# Patient Record
Sex: FEMALE | Race: WHITE | Employment: UNEMPLOYED | URBAN - METROPOLITAN AREA
[De-identification: names, ages, dates, MRNs, and addresses within clinical notes are randomized per-mention and may not be internally consistent; named-entity substitution may affect disease eponyms.]

---

## 2021-09-11 ENCOUNTER — HOSPITAL ENCOUNTER (EMERGENCY)
Age: 86
Discharge: HOME | End: 2021-09-11
Attending: EMERGENCY MEDICINE
Payer: MEDICARE

## 2021-09-11 VITALS
SYSTOLIC BLOOD PRESSURE: 160 MMHG | WEIGHT: 97 LBS | TEMPERATURE: 97.8 F | RESPIRATION RATE: 16 BRPM | DIASTOLIC BLOOD PRESSURE: 78 MMHG | OXYGEN SATURATION: 98 % | BODY MASS INDEX: 18.94 KG/M2 | HEART RATE: 92 BPM

## 2021-09-11 DIAGNOSIS — R30.0 DYSURIA: Primary | ICD-10-CM

## 2021-09-11 DIAGNOSIS — N30.00 ACUTE CYSTITIS WITHOUT HEMATURIA: ICD-10-CM

## 2021-09-11 LAB
BACTERIA: ABNORMAL /HPF
BILIRUBIN URINE: ABNORMAL MG/DL
BLOOD, URINE: ABNORMAL
CAST TYPE: ABNORMAL /HPF
CLARITY: ABNORMAL
COLOR: ABNORMAL
CRYSTAL TYPE: NEGATIVE /HPF
EPITHELIAL CELLS, UA: >10 /HPF
GLUCOSE, URINE: ABNORMAL MG/DL
ICTOTEST: NEGATIVE
KETONES, URINE: ABNORMAL MG/DL
LEUKOCYTE ESTERASE, URINE: ABNORMAL
NITRITE URINE, QUANTITATIVE: ABNORMAL
PH, URINE: ABNORMAL (ref 5–8)
PROTEIN UA: ABNORMAL MG/DL
RBC URINE: 25 /HPF (ref 0–6)
SPECIFIC GRAVITY UA: 1.02 (ref 1–1.03)
UROBILINOGEN, URINE: ABNORMAL MG/DL (ref 0.2–1)
WBC UA: >100 /HPF (ref 0–5)

## 2021-09-11 PROCEDURE — 87086 URINE CULTURE/COLONY COUNT: CPT

## 2021-09-11 PROCEDURE — 87186 SC STD MICRODIL/AGAR DIL: CPT

## 2021-09-11 PROCEDURE — 99282 EMERGENCY DEPT VISIT SF MDM: CPT

## 2021-09-11 PROCEDURE — 81001 URINALYSIS AUTO W/SCOPE: CPT

## 2021-09-11 PROCEDURE — 87077 CULTURE AEROBIC IDENTIFY: CPT

## 2021-09-11 RX ORDER — NITROFURANTOIN 25; 75 MG/1; MG/1
100 CAPSULE ORAL ONCE
Status: DISCONTINUED | OUTPATIENT
Start: 2021-09-11 | End: 2021-09-11 | Stop reason: HOSPADM

## 2021-09-11 RX ORDER — NITROFURANTOIN 25; 75 MG/1; MG/1
100 CAPSULE ORAL 2 TIMES DAILY
Qty: 14 CAPSULE | Refills: 0 | Status: SHIPPED | OUTPATIENT
Start: 2021-09-11 | End: 2021-09-18

## 2021-09-11 RX ORDER — PHENAZOPYRIDINE HYDROCHLORIDE 100 MG/1
100 TABLET, FILM COATED ORAL 3 TIMES DAILY PRN
Qty: 10 TABLET | Refills: 0 | Status: SHIPPED | OUTPATIENT
Start: 2021-09-11 | End: 2021-09-14

## 2021-09-11 ASSESSMENT — PAIN SCALES - WONG BAKER: WONGBAKER_NUMERICALRESPONSE: 0

## 2021-09-11 ASSESSMENT — ENCOUNTER SYMPTOMS
BLOOD IN STOOL: 0
VOMITING: 0
SORE THROAT: 0
TROUBLE SWALLOWING: 0
DIARRHEA: 0
APNEA: 0
STRIDOR: 0
NAUSEA: 0
RECTAL PAIN: 0
CHEST TIGHTNESS: 0
RHINORRHEA: 0
COLOR CHANGE: 0
ABDOMINAL PAIN: 0
EYE PAIN: 0
ABDOMINAL DISTENTION: 0
EYE DISCHARGE: 0
VOICE CHANGE: 0
SHORTNESS OF BREATH: 0
SINUS PRESSURE: 0
CONSTIPATION: 0
BACK PAIN: 0
PHOTOPHOBIA: 0
COUGH: 0
EYE REDNESS: 0
WHEEZING: 0

## 2021-09-11 ASSESSMENT — PAIN SCALES - GENERAL: PAINLEVEL_OUTOF10: 10

## 2021-09-11 ASSESSMENT — PAIN DESCRIPTION - FREQUENCY: FREQUENCY: INTERMITTENT

## 2021-09-11 ASSESSMENT — PAIN DESCRIPTION - DESCRIPTORS: DESCRIPTORS: BURNING

## 2021-09-11 NOTE — ED TRIAGE NOTES
Pt reports burning with urination for a week  Pt seen at [facility] yesterday but didn't stay for results of urine test bc of the wait

## 2021-09-11 NOTE — ED PROVIDER NOTES
Josephine Gracia is a 80year old female with a history of frequent UTIs who presents to the ED with dysuria x1 week. She went to [facility] yesterday and had a urinalysis performed, but she left with out the results due to extended waiting time. BP (!) 160/78   Pulse 92   Temp 97.8 °F (36.6 °C)   Resp 16   Wt 97 lb (44 kg)   SpO2 98%   BMI 18.94 kg/m²     I have reviewed the following from the nursing documentation:      Prior to Admission medications    Medication Sig Start Date End Date Taking? Authorizing Provider   cyanocobalamin 1000 MCG tablet Take 1,000 mcg by mouth daily    Historical Provider, MD   ferrous sulfate 325 (65 FE) MG tablet Take 325 mg by mouth daily (with breakfast)    Historical Provider, MD   furosemide (LASIX) 40 MG tablet Take 40 mg by mouth daily    Historical Provider, MD   gentamicin (GARAMYCIN) 0.1 % ointment Apply topically 3 times daily Apply topically 3 times daily.  To skin tears PRN    Historical Provider, MD   HYDROcodone-acetaminophen (NORCO) 5-325 MG per tablet Take 1-2 tablets by mouth every 6 hours as needed for Pain (1 qam, 1 qnoon, 2 qhs prn)    Historical Provider, MD   LORazepam (ATIVAN) 1 MG tablet Take 1 mg by mouth every 12 hours as needed for Anxiety    Historical Provider, MD   metoprolol tartrate (LOPRESSOR) 25 MG tablet Take 12.5 mg by mouth 2 times daily    Historical Provider, MD   nitrofurantoin (MACRODANTIN) 100 MG capsule Take 100 mg by mouth    Historical Provider, MD   omeprazole (PRILOSEC) 40 MG delayed release capsule Take 40 mg by mouth daily    Historical Provider, MD   potassium chloride (KLOR-CON M) 20 MEQ extended release tablet Take 20 mEq by mouth 2 times daily    Historical Provider, MD   promethazine (PHENERGAN) 25 MG tablet Take 25 mg by mouth every 6 hours as needed for Nausea    Historical Provider, MD   simvastatin (ZOCOR) 40 MG tablet Take 40 mg by mouth nightly    Historical Provider, MD   warfarin (COUMADIN) 2 MG tablet Take 2 mg by mouth 3 mg 3times a week    Historical Provider, MD       Allergies as of 09/11/2021 - Fully Reviewed 09/11/2021   Allergen Reaction Noted    Ciprocinonide [fluocinolone]  10/21/2016    Sulfa antibiotics Hives 10/21/2016    Tylenol [acetaminophen]  10/21/2016    Keflex [cephalexin] Nausea And Vomiting 09/12/2017    Nitrofuran derivatives Nausea And Vomiting 09/13/2017       Past Medical History:   Diagnosis Date    Acid reflux     Arthritis     CAD (coronary artery disease)     does not follow with cardiologist at this time    Cancer [facility])     skin cancer on her arm    [facility]est[facility] [address] [address]     DDD (degenerative disc disease), cervical     \"uses walker\"    Dementia ([facility] [address]     Full dentures     Gallstone     \"they will not due surgery on this\"   Shey [place] Stebbins (hard of hearing)     \"right ear- no hearing aide\"    Hx of blood clots     \"had blood clot both legs few yrs ago\"\"use to be on Coumadin for this- off this now    Hyperlipidemia     Hypertension     PONV (postoperative nausea and vomiting)     UTI (urinary tract infection)     following with dr Ric Sanchez UTI- hospitalized for this twice- getting picc line to get antibiotics\" per son 9/12/2017    Wears glasses         Surgical History:   Past Surgical History:   Procedure Laterality Date    BLADDER SUSPENSION  ? when   [address]      \"last one done approx 5 yrs ago in [place]\" per son 9/12/2017    CORONARY ARTERY BYPASS GRAFT  10+ yrs ago    [facility] son unsure # vessels    KIDNEY SURGERY      OTHER SURGICAL HISTORY      \"got hematomar right leg- removed June 2017\"    OTHER SURGICAL HISTORY Right 09/19/2017    removal tunnel catheter        Family History:    Family History   Problem Relation Age of Onset    Breast Cancer Mother     Cancer Father         prostate       Social History     Socioeconomic History    Marital status:       Spouse name: Not on file    Number of children: Not on file    Years of education: Not on file    Highest education level: Not on file   Occupational History    Not on file   Tobacco Use    Smoking status: Never Smoker    Smokeless tobacco: Never Used   Substance and Sexual Activity    Alcohol use: No    Drug use: No    Sexual activity: Not on file   Other Topics Concern    Not on file   Social History Narrative    Not on file     Social Determinants of Health     Financial Resource Strain:     Difficulty of Paying Living Expenses:    Food Insecurity:     Worried About Running Out of Food in the Last Year:     [address] in the Last Year:    Transportation Needs:     Lack of Transportation (Medical):  Lack of Transportation (Non-Medical):    Physical Activity:     Days of Exercise per Week:     Minutes of Exercise per Session:    Stress:     Feeling of Stress :    Social Connections:     Frequency of Communication with Friends and Family:     Frequency of Social Gatherings with Friends and Family:     Attends [employer] Services:     Active Member of Clubs or Organizations:     Attends Club or Organization Meetings:     Marital Status:    Intimate Partner Violence:     Fear of Current or Ex-Partner:     Emotionally Abused:     Physically Abused:     Sexually Abused:          Review of Systems   Constitutional: Negative for activity change, appetite change, chills, diaphoresis, fatigue, fever and unexpected weight change. HENT: Negative for congestion, ear pain, mouth sores, rhinorrhea, sinus pressure, sore throat, tinnitus, trouble swallowing and voice change. Eyes: Negative for photophobia, pain, discharge, redness and visual disturbance. Respiratory: Negative for apnea, cough, chest tightness, shortness of breath, wheezing and stridor. Cardiovascular: Negative for chest pain, palpitations and leg swelling.    Gastrointestinal: Negative for abdominal distention, abdominal pain, blood in stool, constipation, diarrhea, nausea, rectal pain and vomiting. Genitourinary: Positive for dysuria. Negative for difficulty urinating, dyspareunia, flank pain, frequency, genital sores, menstrual problem, pelvic pain, urgency, vaginal bleeding, vaginal discharge and vaginal pain. Musculoskeletal: Negative for arthralgias, back pain, joint swelling, neck pain and neck stiffness. Skin: Negative for color change and rash. Neurological: Negative for dizziness, tremors, seizures, syncope, facial asymmetry, speech difficulty, weakness, light-headedness, numbness and headaches. Hematological: Negative for adenopathy. Does not bruise/bleed easily. Psychiatric/Behavioral: Negative for agitation, confusion, dysphoric mood, hallucinations, self-injury, sleep disturbance and suicidal ideas. All other systems reviewed and are negative. Physical Exam  Constitutional:       General: She is not in acute distress. Appearance: Normal appearance. She is not ill-appearing. HENT:      Head: Normocephalic and atraumatic. Nose: Nose normal.   Eyes:      Extraocular Movements: Extraocular movements intact. Conjunctiva/sclera: Conjunctivae normal.      Pupils: Pupils are equal, round, and reactive to light. Cardiovascular:      Rate and Rhythm: Normal rate and regular rhythm. Pulses: Normal pulses. Heart sounds: No murmur heard. Pulmonary:      Effort: Pulmonary effort is normal.      Breath sounds: Normal breath sounds. Abdominal:      General: Abdomen is flat. Bowel sounds are normal.      Palpations: Abdomen is soft. Tenderness: There is no abdominal tenderness. There is no right CVA tenderness or left CVA tenderness. Musculoskeletal:         General: Normal range of motion. Cervical back: Normal range of motion. Skin:     General: Skin is warm and dry. Capillary Refill: Capillary refill takes less than 2 seconds. Neurological:      General: No focal deficit present. Mental Status: She is alert and oriented to person, place, and time. Psychiatric:         Mood and Affect: Mood normal.         Behavior: Behavior normal.          Procedures     MDM   Results for orders placed or performed during the hospital encounter of 09/11/21   Urinalysis with microscopic   Result Value Ref Range    Color, UA ORNAGE YELLOW    Clarity, UA CLOUDY (A) CLEAR    Glucose, Urine COLOR INTERFERENCE NEGATIVE MG/DL    Bilirubin Urine COLOR INTERFERENCE NEGATIVE MG/DL    Ketones, Urine COLOR INTERFERENCE NEGATIVE MG/DL    Specific Gravity, UA 1.016 1.001 - 1.035    Blood, Urine COLOR INTERFERENCE NEGATIVE    pH, Urine COLOR INTERFERENCE 5.0 - 8.0    Protein, UA COLOR INTERFERENCE NEGATIVE MG/DL    Urobilinogen, Urine COLOR INTERFERENCE 0.2 - 1.0 MG/DL    Nitrite Urine, Quantitative COLOR INTERFERENCE NEGATIVE    Leukocyte Esterase, Urine COLOR INTERFERENCE NEGATIVE    RBC, UA 25 (H) 0 - 6 /HPF    WBC, UA >100 (H) 0 - 5 /HPF    Epithelial Cells, UA >10 /HPF    Cast Type NO CAST FORMS SEEN NO CAST FORMS SEEN /HPF    Bacteria, UA OCCASIONAL (A) NEGATIVE /HPF    Crystal Type NEGATIVE NEGATIVE /HPF   ICTOTEST, URINE   Result Value Ref Range    Ictotest NEGATIVE          I estimate there is LOW risk for EPIGLOTTITIS, PNEUMONIA, MENINGITIS, OR SEPSIS, thus I consider the discharge disposition reasonable. Also, there is no evidence or peritonitis, sepsis, or toxicity. Ruchi Hoover and I have discussed the diagnosis and risks, and we agree with discharging home to follow-up with their primary doctor. We also discussed returning to the Emergency Department immediately if new or worsening symptoms occur. We have discussed the symptoms which are most concerning (e.g., changing or worsening pain, trouble swallowing or breating, neck stiffness, fever) that necessitate immediate return. Final Impression    1. Dysuria    2.  Acute cystitis without hematuria        Discharge Vital Signs:  Blood pressure (!) 160/78, pulse 92, temperature 97.8 °F (36.6 °C), resp. rate 16, weight 97 lb (44 kg), SpO2 98 %, not currently breastfeeding.      Carly Tijerina MD  09/11/21 1013

## 2021-09-13 LAB
CULTURE: ABNORMAL
CULTURE: ABNORMAL
Lab: ABNORMAL
SPECIMEN: ABNORMAL

## 2021-11-16 ENCOUNTER — APPOINTMENT (OUTPATIENT)
Dept: CT IMAGING | Age: 86
End: 2021-11-16
Payer: MEDICARE

## 2021-11-16 ENCOUNTER — HOSPITAL ENCOUNTER (EMERGENCY)
Age: 86
Discharge: HOME | End: 2021-11-16
Attending: EMERGENCY MEDICINE
Payer: MEDICARE

## 2021-11-16 VITALS
SYSTOLIC BLOOD PRESSURE: 181 MMHG | HEART RATE: 66 BPM | OXYGEN SATURATION: 98 % | WEIGHT: 89 LBS | RESPIRATION RATE: 15 BRPM | BODY MASS INDEX: 17.47 KG/M2 | TEMPERATURE: 97.7 F | DIASTOLIC BLOOD PRESSURE: 89 MMHG | HEIGHT: 60 IN

## 2021-11-16 DIAGNOSIS — R19.7 DIARRHEA, UNSPECIFIED TYPE: Primary | ICD-10-CM

## 2021-11-16 DIAGNOSIS — N39.0 URINARY TRACT INFECTION WITHOUT HEMATURIA, SITE UNSPECIFIED: ICD-10-CM

## 2021-11-16 DIAGNOSIS — R11.0 NAUSEA: ICD-10-CM

## 2021-11-16 LAB
ALBUMIN SERPL-MCNC: 4.2 GM/DL (ref 3.4–5)
ALP BLD-CCNC: 85 IU/L (ref 40–129)
ALT SERPL-CCNC: 12 U/L (ref 10–40)
ANION GAP SERPL CALCULATED.3IONS-SCNC: 12 MMOL/L (ref 4–16)
AST SERPL-CCNC: 18 IU/L (ref 15–37)
BACTERIA: ABNORMAL /HPF
BASOPHILS ABSOLUTE: 0.1 K/CU MM
BASOPHILS RELATIVE PERCENT: 1.2 % (ref 0–1)
BILIRUB SERPL-MCNC: 0.4 MG/DL (ref 0–1)
BILIRUBIN DIRECT: 0.2 MG/DL (ref 0–0.3)
BILIRUBIN URINE: NEGATIVE MG/DL
BILIRUBIN, INDIRECT: 0.2 MG/DL (ref 0–0.7)
BLOOD, URINE: ABNORMAL
BUN BLDV-MCNC: 24 MG/DL (ref 6–23)
CALCIUM SERPL-MCNC: 9.8 MG/DL (ref 8.3–10.6)
CAST TYPE: ABNORMAL /HPF
CHLORIDE BLD-SCNC: 97 MMOL/L (ref 99–110)
CLARITY: ABNORMAL
CO2: 24 MMOL/L (ref 21–32)
COLOR: YELLOW
CREAT SERPL-MCNC: 1.4 MG/DL (ref 0.6–1.1)
CRYSTAL TYPE: NEGATIVE /HPF
DIFFERENTIAL TYPE: ABNORMAL
EOSINOPHILS ABSOLUTE: 0.1 K/CU MM
EOSINOPHILS RELATIVE PERCENT: 1.6 % (ref 0–3)
EPITHELIAL CELLS, UA: 10 /HPF
GFR AFRICAN AMERICAN: 42 ML/MIN/1.73M2
GFR NON-AFRICAN AMERICAN: 35 ML/MIN/1.73M2
GLUCOSE BLD-MCNC: 90 MG/DL (ref 70–99)
GLUCOSE, URINE: NEGATIVE MG/DL
HCT VFR BLD CALC: 46.4 % (ref 37–47)
HEMOGLOBIN: 14.9 GM/DL (ref 12.5–16)
IMMATURE NEUTROPHIL %: 0.1 % (ref 0–0.43)
KETONES, URINE: NEGATIVE MG/DL
LACTIC ACID, SEPSIS: 0.7 MMOL/L (ref 0.5–1.9)
LEUKOCYTE ESTERASE, URINE: ABNORMAL
LIPASE: 118 IU/L (ref 13–60)
LYMPHOCYTES ABSOLUTE: 2.4 K/CU MM
LYMPHOCYTES RELATIVE PERCENT: 32.4 % (ref 24–44)
MCH RBC QN AUTO: 29.7 PG (ref 27–31)
MCHC RBC AUTO-ENTMCNC: 32.1 % (ref 32–36)
MCV RBC AUTO: 92.6 FL (ref 78–100)
MONOCYTES ABSOLUTE: 0.5 K/CU MM
MONOCYTES RELATIVE PERCENT: 7.1 % (ref 0–4)
NITRITE URINE, QUANTITATIVE: NEGATIVE
PDW BLD-RTO: 13.1 % (ref 11.7–14.9)
PH, URINE: 6.5 (ref 5–8)
PLATELET # BLD: 244 K/CU MM (ref 140–440)
PMV BLD AUTO: 10 FL (ref 7.5–11.1)
POTASSIUM SERPL-SCNC: 4.9 MMOL/L (ref 3.5–5.1)
PROTEIN UA: NEGATIVE MG/DL
RBC # BLD: 5.01 M/CU MM (ref 4.2–5.4)
RBC URINE: 25 /HPF (ref 0–6)
SEGMENTED NEUTROPHILS ABSOLUTE COUNT: 4.3 K/CU MM
SEGMENTED NEUTROPHILS RELATIVE PERCENT: 57.6 % (ref 36–66)
SODIUM BLD-SCNC: 133 MMOL/L (ref 135–145)
SPECIFIC GRAVITY UA: 1 (ref 1–1.03)
TOTAL IMMATURE NEUTOROPHIL: 0.01 K/CU MM
TOTAL PROTEIN: 6.9 GM/DL (ref 6.4–8.2)
UROBILINOGEN, URINE: 0.2 MG/DL (ref 0.2–1)
WBC # BLD: 7.4 K/CU MM (ref 4–10.5)
WBC UA: 25 /HPF (ref 0–5)

## 2021-11-16 PROCEDURE — 83605 ASSAY OF LACTIC ACID: CPT

## 2021-11-16 PROCEDURE — 74176 CT ABD & PELVIS W/O CONTRAST: CPT

## 2021-11-16 PROCEDURE — 96375 TX/PRO/DX INJ NEW DRUG ADDON: CPT

## 2021-11-16 PROCEDURE — 80053 COMPREHEN METABOLIC PANEL: CPT

## 2021-11-16 PROCEDURE — 99284 EMERGENCY DEPT VISIT MOD MDM: CPT

## 2021-11-16 PROCEDURE — 82248 BILIRUBIN DIRECT: CPT

## 2021-11-16 PROCEDURE — 87086 URINE CULTURE/COLONY COUNT: CPT

## 2021-11-16 PROCEDURE — U0005 INFEC AGEN DETEC AMPLI PROBE: HCPCS

## 2021-11-16 PROCEDURE — 85025 COMPLETE CBC W/AUTO DIFF WBC: CPT

## 2021-11-16 PROCEDURE — 2500000003 HC RX 250 WO HCPCS: Performed by: EMERGENCY MEDICINE

## 2021-11-16 PROCEDURE — 6370000000 HC RX 637 (ALT 250 FOR IP): Performed by: EMERGENCY MEDICINE

## 2021-11-16 PROCEDURE — 81001 URINALYSIS AUTO W/SCOPE: CPT

## 2021-11-16 PROCEDURE — 83690 ASSAY OF LIPASE: CPT

## 2021-11-16 PROCEDURE — 87088 URINE BACTERIA CULTURE: CPT

## 2021-11-16 PROCEDURE — 96361 HYDRATE IV INFUSION ADD-ON: CPT

## 2021-11-16 PROCEDURE — 6360000002 HC RX W HCPCS: Performed by: EMERGENCY MEDICINE

## 2021-11-16 PROCEDURE — U0003 INFECTIOUS AGENT DETECTION BY NUCLEIC ACID (DNA OR RNA); SEVERE ACUTE RESPIRATORY SYNDROME CORONAVIRUS 2 (SARS-COV-2) (CORONAVIRUS DISEASE [COVID-19]), AMPLIFIED PROBE TECHNIQUE, MAKING USE OF HIGH THROUGHPUT TECHNOLOGIES AS DESCRIBED BY CMS-2020-01-R: HCPCS

## 2021-11-16 PROCEDURE — 96374 THER/PROPH/DIAG INJ IV PUSH: CPT

## 2021-11-16 PROCEDURE — 2580000003 HC RX 258: Performed by: EMERGENCY MEDICINE

## 2021-11-16 PROCEDURE — 87186 SC STD MICRODIL/AGAR DIL: CPT

## 2021-11-16 RX ORDER — NITROFURANTOIN 25; 75 MG/1; MG/1
100 CAPSULE ORAL ONCE
Status: COMPLETED | OUTPATIENT
Start: 2021-11-16 | End: 2021-11-16

## 2021-11-16 RX ORDER — ONDANSETRON 4 MG/1
4 TABLET, ORALLY DISINTEGRATING ORAL EVERY 8 HOURS PRN
Qty: 15 TABLET | Refills: 0 | Status: SHIPPED | OUTPATIENT
Start: 2021-11-16 | End: 2021-12-15 | Stop reason: SDUPTHER

## 2021-11-16 RX ORDER — NITROFURANTOIN 25; 75 MG/1; MG/1
100 CAPSULE ORAL 2 TIMES DAILY
Qty: 10 CAPSULE | Refills: 0 | Status: SHIPPED | OUTPATIENT
Start: 2021-11-16 | End: 2021-11-21

## 2021-11-16 RX ORDER — ONDANSETRON 2 MG/ML
4 INJECTION INTRAMUSCULAR; INTRAVENOUS ONCE
Status: COMPLETED | OUTPATIENT
Start: 2021-11-16 | End: 2021-11-16

## 2021-11-16 RX ORDER — 0.9 % SODIUM CHLORIDE 0.9 %
500 INTRAVENOUS SOLUTION INTRAVENOUS ONCE
Status: COMPLETED | OUTPATIENT
Start: 2021-11-16 | End: 2021-11-16

## 2021-11-16 RX ADMIN — FAMOTIDINE 20 MG: 10 INJECTION, SOLUTION INTRAVENOUS at 10:33

## 2021-11-16 RX ADMIN — NITROFURANTOIN (MONOHYDRATE/MACROCRYSTALS) 100 MG: 75; 25 CAPSULE ORAL at 12:08

## 2021-11-16 RX ADMIN — SODIUM CHLORIDE 500 ML: 9 INJECTION, SOLUTION INTRAVENOUS at 10:33

## 2021-11-16 RX ADMIN — ONDANSETRON 4 MG: 2 INJECTION INTRAMUSCULAR; INTRAVENOUS at 10:33

## 2021-11-16 ASSESSMENT — PAIN DESCRIPTION - FREQUENCY: FREQUENCY: CONTINUOUS

## 2021-11-16 ASSESSMENT — PAIN DESCRIPTION - PAIN TYPE: TYPE: ACUTE PAIN

## 2021-11-16 ASSESSMENT — PAIN SCALES - GENERAL: PAINLEVEL_OUTOF10: 8

## 2021-11-16 ASSESSMENT — PAIN DESCRIPTION - LOCATION: LOCATION: GENERALIZED

## 2021-11-16 NOTE — ED TRIAGE NOTES
Pt arrives with complaints of generalized weakness, \"sick to her stomach\" but hasn't vomited, she does have diarrhea, denies any fever or chills, she also is concerned about not sleeping for past week due to restlessness. She denies any shortness of breath, denies any chest pain, denies cough. She is vaccinated for COVID, she does report nasal congestion but states she is always congested.

## 2021-11-16 NOTE — ED NOTES
Son is Reagan Kimberly [phone] is in the parking lot     [facility]  11/16/21 3452

## 2021-11-16 NOTE — ED PROVIDER NOTES
CHIEF COMPLAINT  Chief Complaint   Patient presents with    Diarrhea       HPI  Glenis A Lars Felty is a 80 y.o. female with history of GERD, CAD, skin cancer, CHF, hypertension, UTI who presents with diarrhea and feeling \"sick to my stomach\" for the past 2 days. Diarrhea is been nonbloody, she denies any urinary changes, she denies any fevers or vomiting. Her discomfort is generalized, nonlocalized and in the abdomen. She denies any associated chest pain, cough. She has chronic nasal congestion which is unchanged. She denies any known ill contacts or fevers. She denies any trauma or falls. She denies any pain in her arms or legs. Her signs and symptoms are moderate and persistent, she has been drinking plenty of water and does not feel dehydrated but does have chronic dry mouth.       REVIEW OF SYSTEMS  Review of Systems   History obtained from chart review and the patient  General ROS: negative for - chills or fever  Ophthalmic ROS: negative for - decreased vision or double vision  ENT ROS: negative for - headaches  Hematological and Lymphatic ROS: negative for - bleeding problems  Endocrine ROS: negative for - unexpected weight changes  Respiratory ROS: no cough, shortness of breath, or wheezing  Cardiovascular ROS: no chest pain or dyspnea on exertion  Gastrointestinal ROS: positive for - diarrhea  Genito-Urinary ROS: no dysuria, trouble voiding, or hematuria  Musculoskeletal ROS: negative for - joint stiffness or joint swelling  Neurological ROS: no TIA or stroke symptoms      PAST MEDICAL HISTORY  Past Medical History:   Diagnosis Date    Acid reflux     Arthritis     CAD (coronary artery disease)     does not follow with cardiologist at this time    Cancer [facility])     skin cancer on her arm    CH[facility]congestive heart failure) [facility] [address]     DDD (degenerative disc disease), cervical     \"uses walker\"    Dementia ([address]     Full dentures     Gallstone     \"they will not due surgery on this\"    [place] (hard of hearing)     \"right ear- no hearing aide\"    Hx of blood clots     \"had blood clot both legs few yrs ago\"\"use to be on Coumadin for this- off this now    Hyperlipidemia     Hypertension     PONV (postoperative nausea and vomiting)     UTI (urinary tract infection)     following with dr Antonio Amaro UTI- hospitalized for this twice- getting picc line to get antibiotics\" per son 9/12/2017    Wears glasses        FAMILY HISTORY  Family History   Problem Relation Age of Onset    Breast Cancer Mother     Cancer Father         prostate       SOCIAL HISTORY  Social History     Socioeconomic History    Marital status:      Spouse name: None    Number of children: None    Years of education: None    Highest education level: None   Occupational History    None   Tobacco Use    Smoking status: Never Smoker    Smokeless tobacco: Never Used   Vaping Use    Vaping Use: Never used   Substance and Sexual Activity    Alcohol use: No    Drug use: No    Sexual activity: None   Other Topics Concern    None   Social History Narrative    None     Social Determinants of Health     Financial Resource Strain:     Difficulty of Paying Living Expenses: Not on file   Food Insecurity:     Worried About Running Out of Food in the Last Year: Not on file    [place] of Food in the Last Year: Not on file   Transportation Needs:     Lack of Transportation (Medical): Not on file    Lack of Transportation (Non-Medical):  Not on file   Physical Activity:     Days of Exercise per Week: Not on file    Minutes of Exercise per Session: Not on file   Stress:     Feeling of Stress : Not on file   Social Connections:     Frequency of Communication with Friends and Family: Not on file    Frequency of Social Gatherings with Friends and Family: Not on file    Attends Caoda[employer] Services: Not on file    Active Member of Clubs or Organizations: Not on file    Attends Club or Organization Meetings: Not on file    Marital Status: Not on file   Intimate Partner Violence:     Fear of Current or Ex-Partner: Not on file    Emotionally Abused: Not on file    Physically Abused: Not on file    Sexually Abused: Not on file   Housing Stability:     Unable to Pay for Housing in the Last Year: Not on file    Number of [place] in the Last Year: Not on file    Unstable Housing in the Last Year: Not on file       SURGICAL HISTORY  Past Surgical History:   Procedure Laterality Date    BLADDER SUSPENSION  ? when   [address]      \"[place] one done approx 5 yrs ago in [place]\" per son 9/12/2017    CORONARY ARTERY BYPASS GRAFT  10+ yrs ago    \"done at [facility]\" son unsure # vessels    KIDNEY SURGERY      OTHER SURGICAL HISTORY      \"got hematomar right leg- removed June 2017\"    OTHER SURGICAL HISTORY Right 09/19/2017    removal tunnel catheter       CURRENT MEDICATIONS  No current facility-administered medications on file prior to encounter. Current Outpatient Medications on File Prior to Encounter   Medication Sig Dispense Refill    cyanocobalamin 1000 MCG tablet Take 1,000 mcg by mouth daily      ferrous sulfate 325 (65 FE) MG tablet Take 325 mg by mouth daily (with breakfast)      furosemide (LASIX) 40 MG tablet Take 40 mg by mouth daily      gentamicin (GARAMYCIN) 0.1 % ointment Apply topically 3 times daily Apply topically 3 times daily.  To skin tears PRN      HYDROcodone-acetaminophen (NORCO) 5-325 MG per tablet Take 1-2 tablets by mouth every 6 hours as needed for Pain (1 qam, 1 qnoon, 2 qhs prn)      LORazepam (ATIVAN) 1 MG tablet Take 1 mg by mouth every 12 hours as needed for Anxiety      metoprolol tartrate (LOPRESSOR) 25 MG tablet Take 12.5 mg by mouth 2 times daily      omeprazole (PRILOSEC) 40 MG delayed release capsule Take 40 mg by mouth daily      potassium chloride (KLOR-CON M) 20 MEQ extended release tablet Take 20 mEq by mouth 2 times daily      promethazine (PHENERGAN) 25 MG tablet Take 25 mg by mouth every 6 hours as needed for Nausea      simvastatin (ZOCOR) 40 MG tablet Take 40 mg by mouth nightly      warfarin (COUMADIN) 2 MG tablet Take 2 mg by mouth 3 mg 3times a week           ALLERGIES  Allergies   Allergen Reactions    Ciprocinonide [Fluocinolone]     Sulfa Antibiotics Hives    Tylenol [Acetaminophen]      GI upset    Keflex [Cephalexin] Nausea And Vomiting    Nitrofuran Derivatives Nausea And Vomiting     Macrodantin       PHYSICAL EXAM  VITAL SIGNS: BP (!) 181/89   Pulse 66   Temp 97.7 °F (36.5 °C)   Resp 15   Ht 5' (1.524 m)   Wt 89 lb (40.4 kg)   SpO2 98%   BMI 17.38 kg/m²   Constitutional: Well developed, Well nourished, resting in bed, elderly, pleasant  HENT: Normocephalic, Atraumatic, Bilateral external ears normal, Oropharynx dry, No oral exudates, Nose normal.   Eyes: PERRL, EOMI, Conjunctiva normal, No discharge. Neck: Normal range of motion, Supple, No stridor. Cardiovascular: Normal heart rate, Normal rhythm, No murmurs, No rubs, No gallops. Thorax & Lungs: Normal breath sounds, No respiratory distress, No wheezing, No chest tenderness. Abdomen: Bowel sounds normal, Soft, minimal lower tenderness, no guarding, no rebound, No masses, No pulsatile masses. No CVA tenderness  Skin: Warm, Dry, No erythema, No rash. Extremities: Intact distal pulses, No edema, No tenderness, No cyanosis, No clubbing. Musculoskeletal: Good gross range of motion in all major joints. No major deformities noted. Neurologic: Alert & oriented to person and place, Normal gross motor function, Normal gross sensory function, No focal deficits noted. Psychiatric: Affect normal    RADIOLOGY/PROCEDURES/LABS  Last Imaging results   CT ABDOMEN PELVIS WO CONTRAST Additional Contrast? None   Final Result   1. Moderate to large hiatal hernia to the right of midline with some   atelectatic changes right lower lobe.    2. Extensive diverticulosis worse in the sigmoid but no acute diverticulitis. 3. Significant left renal atrophy and hypertrophy of the right kidney appear   chronic. No obstructive uropathy. 4. Moderate to severe scoliotic deformity which is partly degenerative in   nature. Small bowel ileus. 5. Right inguinal hernia which contains a small portion of bowel but no   obstruction or strangulation. Imaging reviewed by myself    Labs Reviewed   CBC WITH AUTO DIFFERENTIAL - Abnormal; Notable for the following components:       Result Value    Monocytes % 7.1 (*)     Basophils % 1.2 (*)     All other components within normal limits   BASIC METABOLIC PANEL W/ REFLEX TO MG FOR LOW K - Abnormal; Notable for the following components:    Sodium 133 (*)     Chloride 97 (*)     BUN 24 (*)     CREATININE 1.4 (*)     GFR Non- 35 (*)     GFR  42 (*)     All other components within normal limits   LIPASE - Abnormal; Notable for the following components:    Lipase 118 (*)     All other components within normal limits   URINALYSIS - Abnormal; Notable for the following components:    Clarity, UA SLIGHTLY CLOUDY (*)     Blood, Urine MODERATE (*)     Leukocyte Esterase, Urine MODERATE (*)     RBC, UA 25 (*)     WBC, UA 25 (*)     Bacteria, UA FEW (*)     All other components within normal limits   CULTURE, URINE   HEPATIC FUNCTION PANEL   LACTATE, SEPSIS   COVID-19         Medications   0.9 % sodium chloride bolus (0 mLs IntraVENous Stopped 11/16/21 1159)   famotidine (PEPCID) injection 20 mg (20 mg IntraVENous Given 11/16/21 1033)   ondansetron (ZOFRAN) injection 4 mg (4 mg IntraVENous Given 11/16/21 1033)   nitrofurantoin (macrocrystal-monohydrate) (MACROBID) capsule 100 mg (100 mg Oral Given 11/16/21 1208)       COURSE & MEDICAL DECISION MAKING  Pertinent Labs & Imaging studies reviewed. (See chart for details)    80year-old female presents with fatigue, nausea. She does have a hiatal hernia which may be contributing to her nausea. While her lipase is elevated, she is not having other clinical signs and symptoms of pancreatitis, there is no pancreatitis visualized on CT scan. She did not have any recurrent diarrhea while in the department, there is not suggestion of colitis or diverticulitis. Her UA is suggestive of UTI and in discussions with her son, this has been a chronic concern for which she has been following with urology. She will be started on Macrobid as based on her allergies this is a low risk. She also be started on Zofran. Covid testing as an outpatient which her son is agreeable to follow-up on MyChart. Discharged in improved condition at time of recheck after some IV fluids and nausea medications and Pepcid. Strict return precautions put into place, son agreeable with plan of care, patient agreeable plan of care. FINAL IMPRESSION  Problem List Items Addressed This Visit     None      Visit Diagnoses     Diarrhea, unspecified type    -  Primary    Urinary tract infection without hematuria, site unspecified        Nausea          1.    2.   3.    Patient gave me permission to discuss medical history, care, and plan with those present in the room.   Electronically signed by: Murtaza Amaya MD, 11/16/2021  MD Murtaza Ashley MD  11/16/21 2003

## 2021-11-17 LAB
SARS-COV-2: NOT DETECTED
SOURCE: NORMAL

## 2021-11-18 LAB
CULTURE: ABNORMAL
CULTURE: ABNORMAL
Lab: ABNORMAL
SPECIMEN: ABNORMAL

## 2021-12-15 ENCOUNTER — HOSPITAL ENCOUNTER (EMERGENCY)
Age: 86
Discharge: HOME | End: 2021-12-15
Attending: EMERGENCY MEDICINE
Payer: MEDICARE

## 2021-12-15 VITALS
BODY MASS INDEX: 18.65 KG/M2 | HEIGHT: 60 IN | RESPIRATION RATE: 15 BRPM | HEART RATE: 65 BPM | WEIGHT: 95 LBS | SYSTOLIC BLOOD PRESSURE: 168 MMHG | DIASTOLIC BLOOD PRESSURE: 95 MMHG | TEMPERATURE: 97.7 F | OXYGEN SATURATION: 96 %

## 2021-12-15 DIAGNOSIS — R11.0 NAUSEA: ICD-10-CM

## 2021-12-15 DIAGNOSIS — R53.1 GENERAL WEAKNESS: Primary | ICD-10-CM

## 2021-12-15 LAB
ALBUMIN SERPL-MCNC: 3.4 GM/DL (ref 3.4–5)
ALP BLD-CCNC: 73 IU/L (ref 40–129)
ALT SERPL-CCNC: 9 U/L (ref 10–40)
ANION GAP SERPL CALCULATED.3IONS-SCNC: 10 MMOL/L (ref 4–16)
AST SERPL-CCNC: 17 IU/L (ref 15–37)
BACTERIA: ABNORMAL /HPF
BASOPHILS ABSOLUTE: 0.1 K/CU MM
BASOPHILS RELATIVE PERCENT: 1.3 % (ref 0–1)
BILIRUB SERPL-MCNC: 0.3 MG/DL (ref 0–1)
BILIRUBIN URINE: NEGATIVE MG/DL
BLOOD, URINE: ABNORMAL
BUN BLDV-MCNC: 29 MG/DL (ref 6–23)
CALCIUM SERPL-MCNC: 9.3 MG/DL (ref 8.3–10.6)
CAST TYPE: ABNORMAL /HPF
CHLORIDE BLD-SCNC: 102 MMOL/L (ref 99–110)
CLARITY: ABNORMAL
CO2: 26 MMOL/L (ref 21–32)
COLOR: ABNORMAL
CREAT SERPL-MCNC: 1.4 MG/DL (ref 0.6–1.1)
CRYSTAL TYPE: NEGATIVE /HPF
DIFFERENTIAL TYPE: ABNORMAL
EOSINOPHILS ABSOLUTE: 0.1 K/CU MM
EOSINOPHILS RELATIVE PERCENT: 1.7 % (ref 0–3)
EPITHELIAL CELLS, UA: 7 /HPF
GFR AFRICAN AMERICAN: 42 ML/MIN/1.73M2
GFR NON-AFRICAN AMERICAN: 35 ML/MIN/1.73M2
GLUCOSE BLD-MCNC: 92 MG/DL (ref 70–99)
GLUCOSE, URINE: NEGATIVE MG/DL
HCT VFR BLD CALC: 43.2 % (ref 37–47)
HEMOGLOBIN: 13.5 GM/DL (ref 12.5–16)
IMMATURE NEUTROPHIL %: 0.4 % (ref 0–0.43)
KETONES, URINE: NEGATIVE MG/DL
LEUKOCYTE ESTERASE, URINE: ABNORMAL
LYMPHOCYTES ABSOLUTE: 2 K/CU MM
LYMPHOCYTES RELATIVE PERCENT: 27.7 % (ref 24–44)
MCH RBC QN AUTO: 29.3 PG (ref 27–31)
MCHC RBC AUTO-ENTMCNC: 31.3 % (ref 32–36)
MCV RBC AUTO: 93.9 FL (ref 78–100)
MONOCYTES ABSOLUTE: 0.6 K/CU MM
MONOCYTES RELATIVE PERCENT: 7.8 % (ref 0–4)
NITRITE URINE, QUANTITATIVE: NEGATIVE
PDW BLD-RTO: 13.3 % (ref 11.7–14.9)
PH, URINE: 5 (ref 5–8)
PLATELET # BLD: 238 K/CU MM (ref 140–440)
PMV BLD AUTO: 10 FL (ref 7.5–11.1)
POTASSIUM SERPL-SCNC: 5.3 MMOL/L (ref 3.5–5.1)
PROTEIN UA: NEGATIVE MG/DL
RBC # BLD: 4.6 M/CU MM (ref 4.2–5.4)
RBC URINE: 15 /HPF (ref 0–6)
SEGMENTED NEUTROPHILS ABSOLUTE COUNT: 4.4 K/CU MM
SEGMENTED NEUTROPHILS RELATIVE PERCENT: 61.1 % (ref 36–66)
SODIUM BLD-SCNC: 138 MMOL/L (ref 135–145)
SPECIFIC GRAVITY UA: 1.01 (ref 1–1.03)
TOTAL IMMATURE NEUTOROPHIL: 0.03 K/CU MM
TOTAL PROTEIN: 6.5 GM/DL (ref 6.4–8.2)
UROBILINOGEN, URINE: 0.2 MG/DL (ref 0.2–1)
WBC # BLD: 7.2 K/CU MM (ref 4–10.5)
WBC UA: 5 /HPF (ref 0–5)

## 2021-12-15 PROCEDURE — 87086 URINE CULTURE/COLONY COUNT: CPT

## 2021-12-15 PROCEDURE — 99283 EMERGENCY DEPT VISIT LOW MDM: CPT

## 2021-12-15 PROCEDURE — 80053 COMPREHEN METABOLIC PANEL: CPT

## 2021-12-15 PROCEDURE — 87186 SC STD MICRODIL/AGAR DIL: CPT

## 2021-12-15 PROCEDURE — 85025 COMPLETE CBC W/AUTO DIFF WBC: CPT

## 2021-12-15 PROCEDURE — 87088 URINE BACTERIA CULTURE: CPT

## 2021-12-15 PROCEDURE — 81001 URINALYSIS AUTO W/SCOPE: CPT

## 2021-12-15 RX ORDER — ONDANSETRON 4 MG/1
4 TABLET, ORALLY DISINTEGRATING ORAL EVERY 8 HOURS PRN
Qty: 15 TABLET | Refills: 0 | Status: SHIPPED | OUTPATIENT
Start: 2021-12-15

## 2021-12-15 NOTE — ED NOTES
Blood collected with IV start, labeled and walked to lab for processing.       Yesi Witt RN  12/15/21 [id number]

## 2021-12-15 NOTE — ED NOTES
Called son to verify meds. Reports she is out of zofran. Updated physician.       Yesi Witt RN  12/15/21 9254

## 2021-12-15 NOTE — ED PROVIDER NOTES
Triage Chief Complaint:   Diarrhea and Fatigue      [place]:  Ruchi Lynn is a 80 y.o. female that presents to the emergency department with weakness, fatigue, and nausea. Patient states that she \"feels sick to my stomach\" for the past 3 to 4 days. She states she has been eating and drinking normally. She states she drinks \"a lot of water. \" She states that she feels very tired and fatigued. She complains of nausea and states that she \"spits up\" sometimes but not truly emesis. Triage complaints is diarrhea but she tells me that she is not really having any diarrhea. She has had this before. She complains of dry mouth. She has a history of UTI but denies dysuria, hematuria, urinary urgency or frequency. She states that she has been on a medicine for her stomach but insurance doesn't pay for it anymore. She is unsure who prescribed this medicine or what the name of it is. He said it really did help with her nausea. She states that she has had many UTIs in the past and does not feel that she currently has a UTI.   She denies chest pain, shortness of breath, cough    Past Medical History:   Diagnosis Date    Acid reflux     Arthritis     CAD (coronary artery disease)     does not follow with cardiologist at this time    Cancer [facility])     skin cancer on her arm    CHF (congestive heart failure) ([address]     DDD (degenerative disc disease), cervical     \"uses walker\"    Dementia ([facility] [address]     Full dentures     Gallstone     \"they will not due surgery on this\"   [facility] (hard of hearing)     \"right ear- no hearing aide\"    Hx of blood clots     \"had blood clot both legs few yrs ago\"\"use to be on Coumadin for this- off this now    Hyperlipidemia     Hypertension     PONV (postoperative nausea and vomiting)     UTI (urinary tract infection)     following with dr Chhaya Garsia UTI- hospitalized for this twice- getting picc line to get antibiotics\" per son 9/12/2017    Wears glasses      Past Surgical History: Procedure Laterality Date    BLADDER SUSPENSION  ? when   [address]      \"last one done approx 5 yrs ago in [place]\" per son 9/12/2017    CORONARY ARTERY BYPASS GRAFT  10+ yrs ago    \"done at [facility]\" son unsure # vessels    KIDNEY SURGERY      OTHER SURGICAL HISTORY      \"got hematomar right leg- removed June 2017\"    OTHER SURGICAL HISTORY Right 09/19/2017    removal tunnel catheter     Family History   Problem Relation Age of Onset    Breast Cancer Mother     Cancer Father         prostate     Social History     Socioeconomic History    Marital status:      Spouse name: Not on file    Number of children: Not on file    Years of education: Not on file    Highest education level: Not on file   Occupational History    Not on file   Tobacco Use    Smoking status: Never Smoker    Smokeless tobacco: Never Used   Vaping Use    Vaping Use: Never used   Substance and Sexual Activity    Alcohol use: No    Drug use: No    Sexual activity: Not on file   Other Topics Concern    Not on file   Social History Narrative    Not on file     Social Determinants of Health     Financial Resource Strain:     Difficulty of Paying Living Expenses: Not on file   Food Insecurity:     Worried About Running Out of Food in the Last Year: Not on file    [place] of Food in the Last Year: Not on file   Transportation Needs:     Lack of Transportation (Medical): Not on file    Lack of Transportation (Non-Medical):  Not on file   Physical Activity:     Days of Exercise per Week: Not on file    Minutes of Exercise per Session: Not on file   Stress:     Feeling of Stress : Not on file   Social Connections:     Frequency of Communication with Friends and Family: Not on file    Frequency of Social Gatherings with Friends and Family: Not on file    Attends Christianity Services: Not on file    Active Member of Clubs or Organizations: Not on file    Attends Club or Organization Meetings: Not on file    Marital Status: Not on file   Intimate Partner Violence:     Fear of Current or Ex-Partner: Not on file    Emotionally Abused: Not on file    Physically Abused: Not on file    Sexually Abused: Not on file   Housing Stability:     Unable to Pay for Housing in the Last Year: Not on file    Number of [place] in the Last Year: Not on file    Unstable Housing in the Last Year: Not on file     No current facility-administered medications for this encounter. Current Outpatient Medications   Medication Sig Dispense Refill    ondansetron (ZOFRAN ODT) 4 MG disintegrating tablet Take 1 tablet by mouth every 8 hours as needed for Nausea 15 tablet 0    cyanocobalamin 1000 MCG tablet Take 1,000 mcg by mouth daily      ferrous sulfate 325 (65 FE) MG tablet Take 325 mg by mouth daily (with breakfast)      furosemide (LASIX) 40 MG tablet Take 40 mg by mouth daily      gentamicin (GARAMYCIN) 0.1 % ointment Apply topically 3 times daily Apply topically 3 times daily.  To skin tears PRN      HYDROcodone-acetaminophen (NORCO) 5-325 MG per tablet Take 1-2 tablets by mouth every 6 hours as needed for Pain (1 qam, 1 qnoon, 2 qhs prn)      LORazepam (ATIVAN) 1 MG tablet Take 1 mg by mouth every 12 hours as needed for Anxiety      metoprolol tartrate (LOPRESSOR) 25 MG tablet Take 12.5 mg by mouth 2 times daily      omeprazole (PRILOSEC) 40 MG delayed release capsule Take 40 mg by mouth daily      potassium chloride (KLOR-CON M) 20 MEQ extended release tablet Take 20 mEq by mouth 2 times daily      promethazine (PHENERGAN) 25 MG tablet Take 25 mg by mouth every 6 hours as needed for Nausea      simvastatin (ZOCOR) 40 MG tablet Take 40 mg by mouth nightly      warfarin (COUMADIN) 2 MG tablet Take 2 mg by mouth 3 mg 3times a week       Allergies   Allergen Reactions    Ciprofloxacin Swelling    Ciprocinonide [Fluocinolone]     Sulfa Antibiotics Hives    Tylenol [Acetaminophen]      GI upset    Keflex [Cephalexin] Nausea And Vomiting    Methenamine Hippurate Nausea Only    Nitrofuran Derivatives Nausea And Vomiting     Macrodantin     Nursing Notes Reviewed    ROS:  At least 10 systems reviewed and otherwise negative except as in the [address] Physical Exam:  ED Triage Vitals [12/15/21 0829]   Enc Vitals Group      BP (!) 180/107      Pulse 61      Resp 15      Temp 97.7 °F (36.5 °C)      Temp src       SpO2 97 %      Weight 95 lb (43.1 kg)      Height 5' (1.524 m)      Head Circumference       Peak Flow       Pain Score       Pain Loc       Pain Edu? Excl. in [address]? My pulse oximetry interpretation is which is within the normal range    GENERAL APPEARANCE: Awake and alert. Cooperative. No acute distress. HEAD:  Atraumatic. EYES: EOM's grossly intact. ENT: Mucous membranes are moist.  No trismus. NECK:  Trachea midline. HEART: RRR. Radial pulses 2+. LUNGS: Respirations unlabored. CTAB  ABDOMEN: Soft. Non-tender. No guarding or rebound. EXTREMITIES: No acute deformities. SKIN: Warm and dry. NEUROLOGICAL: No gross facial drooping. Moves all 4 extremities spontaneously. PSYCHIATRIC: Normal mood.     I have reviewed and interpreted all of the currently available lab results from this visit (if applicable):  Results for orders placed or performed during the hospital encounter of 12/15/21   CBC Auto Differential   Result Value Ref Range    WBC 7.2 4.0 - 10.5 K/CU MM    RBC 4.60 4.2 - 5.4 M/CU MM    Hemoglobin 13.5 12.5 - 16.0 GM/DL    Hematocrit 43.2 37 - 47 %    MCV 93.9 78 - 100 FL    MCH 29.3 27 - 31 PG    MCHC 31.3 (L) 32.0 - 36.0 %    RDW 13.3 11.7 - 14.9 %    Platelets 665 452 - 233 K/CU MM    MPV 10.0 7.5 - 11.1 FL    Differential Type AUTOMATED DIFFERENTIAL     Segs Relative 61.1 36 - 66 %    Lymphocytes % 27.7 24 - 44 %    Monocytes % 7.8 (H) 0 - 4 %    Eosinophils % 1.7 0 - 3 %    Basophils % 1.3 (H) 0 - 1 %    Segs Absolute 4.4 K/CU MM    Lymphocytes Absolute 2.0 K/CU MM Monocytes Absolute 0.6 K/CU MM    Eosinophils Absolute 0.1 K/CU MM    Basophils Absolute 0.1 K/CU MM    Immature Neutrophil % 0.4 0 - 0.43 %    Total Immature Neutrophil 0.03 K/CU MM   Comprehensive Metabolic Panel w/ Reflex to MG   Result Value Ref Range    Sodium 138 135 - 145 MMOL/L    Potassium 5.3 (H) 3.5 - 5.1 MMOL/L    Chloride 102 99 - 110 mMol/L    CO2 26 21 - 32 MMOL/L    BUN 29 (H) 6 - 23 MG/DL    CREATININE 1.4 (H) 0.6 - 1.1 MG/DL    Glucose 92 70 - 99 MG/DL    Calcium 9.3 8.3 - 10.6 MG/DL    Albumin 3.4 3.4 - 5.0 GM/DL    Total Protein 6.5 6.4 - 8.2 GM/DL    Total Bilirubin 0.3 0.0 - 1.0 MG/DL    ALT 9 (L) 10 - 40 U/L    AST 17 15 - 37 IU/L    Alkaline Phosphatase 73 40 - 129 IU/L    GFR Non- 35 (L) >60 mL/min/1.73m2    GFR  42 (L) >60 mL/min/1.73m2    Anion Gap 10 4 - 16   Urinalysis with microscopic   Result Value Ref Range    Color, UA PALE YELLOW (A) YELLOW    Clarity, UA SLIGHTLY CLOUDY (A) CLEAR    Glucose, Urine NEGATIVE NEGATIVE MG/DL    Bilirubin Urine NEGATIVE NEGATIVE MG/DL    Ketones, Urine NEGATIVE NEGATIVE MG/DL    Specific Gravity, UA 1.010 1.001 - 1.035    Blood, Urine MODERATE (A) NEGATIVE    pH, Urine 5.0 5.0 - 8.0    Protein, UA NEGATIVE NEGATIVE MG/DL    Urobilinogen, Urine 0.2 0.2 - 1.0 MG/DL    Nitrite Urine, Quantitative NEGATIVE NEGATIVE    Leukocyte Esterase, Urine SMALL (A) NEGATIVE    RBC, UA 15 (H) 0 - 6 /HPF    WBC, UA 5 0 - 5 /HPF    Epithelial Cells, UA 7 /HPF    Cast Type NO CAST FORMS SEEN NO CAST FORMS SEEN /HPF    Bacteria, UA TRACE (A) NEGATIVE /HPF    Crystal Type NEGATIVE NEGATIVE /HPF          EKG: (All EKG's are interpreted by myself in the absence of a cardiologist)      MDM:  Patient's vital signs are stable. She is awake alert and in no acute distress. She has been seen before for these similar symptoms. Her CBC is normal.  Chemistry shows a creatinine of 1.4 which is similar to prior.   Her urinalysis does not really show

## 2021-12-15 NOTE — ED TRIAGE NOTES
Pt arrived via triage with c/o continuing upset stomach. Pt reports intermittent diarrhea. Pt reports insurance no longer pays for her \"stomach pills\". Pt also endorses fatigue. Pt is alert, oriented and ambulatory with assistance.

## 2021-12-17 LAB
CULTURE: ABNORMAL
CULTURE: ABNORMAL
Lab: ABNORMAL
SPECIMEN: ABNORMAL

## 2022-11-12 ENCOUNTER — HOSPITAL ENCOUNTER (INPATIENT)
Age: 87
LOS: 4 days | Discharge: HOME HEALTH | DRG: 871 | End: 2022-11-16
Attending: EMERGENCY MEDICINE | Admitting: STUDENT IN AN ORGANIZED HEALTH CARE EDUCATION/TRAINING PROGRAM
Payer: MEDICARE

## 2022-11-12 ENCOUNTER — APPOINTMENT (OUTPATIENT)
Dept: GENERAL RADIOLOGY | Age: 87
DRG: 871 | End: 2022-11-12
Payer: MEDICARE

## 2022-11-12 DIAGNOSIS — J18.9 PNEUMONIA DUE TO INFECTIOUS ORGANISM, UNSPECIFIED LATERALITY, UNSPECIFIED PART OF LUNG: Primary | ICD-10-CM

## 2022-11-12 PROBLEM — J16.0 COMMUNITY ACQUIRED PNEUMONIA DUE TO CHLAMYDIA SPECIES: Status: ACTIVE | Noted: 2022-11-12

## 2022-11-12 LAB
ALBUMIN SERPL-MCNC: 3.7 GM/DL (ref 3.4–5)
ALP BLD-CCNC: 56 IU/L (ref 40–129)
ALT SERPL-CCNC: 11 U/L (ref 10–40)
ANION GAP SERPL CALCULATED.3IONS-SCNC: 13 MMOL/L (ref 4–16)
AST SERPL-CCNC: 21 IU/L (ref 15–37)
BACTERIA: ABNORMAL /HPF
BASOPHILS ABSOLUTE: 0.1 K/CU MM
BASOPHILS RELATIVE PERCENT: 0.4 % (ref 0–1)
BILIRUB SERPL-MCNC: 0.5 MG/DL (ref 0–1)
BILIRUBIN URINE: NEGATIVE MG/DL
BLOOD, URINE: ABNORMAL
BUN BLDV-MCNC: 33 MG/DL (ref 6–23)
CALCIUM SERPL-MCNC: 9.3 MG/DL (ref 8.3–10.6)
CAST TYPE: ABNORMAL /HPF
CHLORIDE BLD-SCNC: 99 MMOL/L (ref 99–110)
CLARITY: CLEAR
CO2: 25 MMOL/L (ref 21–32)
COLOR: YELLOW
CREAT SERPL-MCNC: 1.5 MG/DL (ref 0.6–1.1)
CRYSTAL TYPE: NEGATIVE /HPF
DIFFERENTIAL TYPE: ABNORMAL
EOSINOPHILS ABSOLUTE: 0 K/CU MM
EOSINOPHILS RELATIVE PERCENT: 0.1 % (ref 0–3)
EPITHELIAL CELLS, UA: 1 /HPF
GFR SERPL CREATININE-BSD FRML MDRD: 32 ML/MIN/1.73M2
GLUCOSE BLD-MCNC: 101 MG/DL (ref 70–99)
GLUCOSE, URINE: NEGATIVE MG/DL
HCT VFR BLD CALC: 41.6 % (ref 37–47)
HEMOGLOBIN: 13.2 GM/DL (ref 12.5–16)
IMMATURE NEUTROPHIL %: 1 % (ref 0–0.43)
KETONES, URINE: NEGATIVE MG/DL
LACTIC ACID, SEPSIS: 1.1 MMOL/L (ref 0.5–1.9)
LEUKOCYTE ESTERASE, URINE: NEGATIVE
LIPASE: 24 IU/L (ref 13–60)
LYMPHOCYTES ABSOLUTE: 1 K/CU MM
LYMPHOCYTES RELATIVE PERCENT: 4.4 % (ref 24–44)
MCH RBC QN AUTO: 29.8 PG (ref 27–31)
MCHC RBC AUTO-ENTMCNC: 31.7 % (ref 32–36)
MCV RBC AUTO: 93.9 FL (ref 78–100)
MONOCYTES ABSOLUTE: 1 K/CU MM
MONOCYTES RELATIVE PERCENT: 4.4 % (ref 0–4)
NITRITE URINE, QUANTITATIVE: NEGATIVE
PDW BLD-RTO: 13.6 % (ref 11.7–14.9)
PH, URINE: 6.5 (ref 5–8)
PLATELET # BLD: 204 K/CU MM (ref 140–440)
PMV BLD AUTO: 10 FL (ref 7.5–11.1)
POTASSIUM SERPL-SCNC: 4.3 MMOL/L (ref 3.5–5.1)
PROTEIN UA: NEGATIVE MG/DL
RAPID INFLUENZA  B AGN: NEGATIVE
RAPID INFLUENZA A AGN: NEGATIVE
RBC # BLD: 4.43 M/CU MM (ref 4.2–5.4)
RBC URINE: 1 /HPF (ref 0–6)
SARS-COV-2, NAAT: NOT DETECTED
SEGMENTED NEUTROPHILS ABSOLUTE COUNT: 20 K/CU MM
SEGMENTED NEUTROPHILS RELATIVE PERCENT: 89.7 % (ref 36–66)
SODIUM BLD-SCNC: 137 MMOL/L (ref 135–145)
SOURCE: NORMAL
SPECIFIC GRAVITY UA: <1.005 (ref 1–1.03)
TOTAL IMMATURE NEUTOROPHIL: 0.23 K/CU MM
TOTAL PROTEIN: 7.1 GM/DL (ref 6.4–8.2)
TROPONIN T: 0.01 NG/ML
UROBILINOGEN, URINE: 0.2 MG/DL (ref 0.2–1)
WBC # BLD: 22.3 K/CU MM (ref 4–10.5)
WBC UA: 1 /HPF (ref 0–5)

## 2022-11-12 PROCEDURE — 84145 PROCALCITONIN (PCT): CPT

## 2022-11-12 PROCEDURE — 71045 X-RAY EXAM CHEST 1 VIEW: CPT

## 2022-11-12 PROCEDURE — 6360000002 HC RX W HCPCS: Performed by: EMERGENCY MEDICINE

## 2022-11-12 PROCEDURE — 85025 COMPLETE CBC W/AUTO DIFF WBC: CPT

## 2022-11-12 PROCEDURE — 6360000002 HC RX W HCPCS: Performed by: STUDENT IN AN ORGANIZED HEALTH CARE EDUCATION/TRAINING PROGRAM

## 2022-11-12 PROCEDURE — 84484 ASSAY OF TROPONIN QUANT: CPT

## 2022-11-12 PROCEDURE — 87635 SARS-COV-2 COVID-19 AMP PRB: CPT

## 2022-11-12 PROCEDURE — 80053 COMPREHEN METABOLIC PANEL: CPT

## 2022-11-12 PROCEDURE — 2580000003 HC RX 258: Performed by: EMERGENCY MEDICINE

## 2022-11-12 PROCEDURE — 87899 AGENT NOS ASSAY W/OPTIC: CPT

## 2022-11-12 PROCEDURE — 6370000000 HC RX 637 (ALT 250 FOR IP): Performed by: STUDENT IN AN ORGANIZED HEALTH CARE EDUCATION/TRAINING PROGRAM

## 2022-11-12 PROCEDURE — 36415 COLL VENOUS BLD VENIPUNCTURE: CPT

## 2022-11-12 PROCEDURE — 81001 URINALYSIS AUTO W/SCOPE: CPT

## 2022-11-12 PROCEDURE — 87804 INFLUENZA ASSAY W/OPTIC: CPT

## 2022-11-12 PROCEDURE — 2060000000 HC ICU INTERMEDIATE R&B

## 2022-11-12 PROCEDURE — 93005 ELECTROCARDIOGRAM TRACING: CPT | Performed by: EMERGENCY MEDICINE

## 2022-11-12 PROCEDURE — 86738 MYCOPLASMA ANTIBODY: CPT

## 2022-11-12 PROCEDURE — 87449 NOS EACH ORGANISM AG IA: CPT

## 2022-11-12 PROCEDURE — 99285 EMERGENCY DEPT VISIT HI MDM: CPT

## 2022-11-12 PROCEDURE — 96365 THER/PROPH/DIAG IV INF INIT: CPT

## 2022-11-12 PROCEDURE — 83605 ASSAY OF LACTIC ACID: CPT

## 2022-11-12 PROCEDURE — 83690 ASSAY OF LIPASE: CPT

## 2022-11-12 PROCEDURE — 87040 BLOOD CULTURE FOR BACTERIA: CPT

## 2022-11-12 PROCEDURE — 96366 THER/PROPH/DIAG IV INF ADDON: CPT

## 2022-11-12 PROCEDURE — 2580000003 HC RX 258: Performed by: STUDENT IN AN ORGANIZED HEALTH CARE EDUCATION/TRAINING PROGRAM

## 2022-11-12 RX ORDER — LANOLIN ALCOHOL/MO/W.PET/CERES
1000 CREAM (GRAM) TOPICAL DAILY
Status: DISCONTINUED | OUTPATIENT
Start: 2022-11-13 | End: 2022-11-16 | Stop reason: HOSPADM

## 2022-11-12 RX ORDER — ATORVASTATIN CALCIUM 10 MG/1
20 TABLET, FILM COATED ORAL DAILY
Status: DISCONTINUED | OUTPATIENT
Start: 2022-11-13 | End: 2022-11-16 | Stop reason: HOSPADM

## 2022-11-12 RX ORDER — PANTOPRAZOLE SODIUM 40 MG/1
40 TABLET, DELAYED RELEASE ORAL
Status: DISCONTINUED | OUTPATIENT
Start: 2022-11-13 | End: 2022-11-16 | Stop reason: HOSPADM

## 2022-11-12 RX ORDER — SODIUM CHLORIDE 0.9 % (FLUSH) 0.9 %
5-40 SYRINGE (ML) INJECTION PRN
Status: DISCONTINUED | OUTPATIENT
Start: 2022-11-12 | End: 2022-11-16 | Stop reason: HOSPADM

## 2022-11-12 RX ORDER — HYDROCODONE BITARTRATE AND ACETAMINOPHEN 5; 325 MG/1; MG/1
1 TABLET ORAL EVERY 6 HOURS PRN
Status: DISCONTINUED | OUTPATIENT
Start: 2022-11-12 | End: 2022-11-16 | Stop reason: HOSPADM

## 2022-11-12 RX ORDER — ACETAMINOPHEN 650 MG/1
650 SUPPOSITORY RECTAL EVERY 6 HOURS PRN
Status: DISCONTINUED | OUTPATIENT
Start: 2022-11-12 | End: 2022-11-16 | Stop reason: HOSPADM

## 2022-11-12 RX ORDER — ONDANSETRON 4 MG/1
4 TABLET, ORALLY DISINTEGRATING ORAL EVERY 8 HOURS PRN
Status: DISCONTINUED | OUTPATIENT
Start: 2022-11-12 | End: 2022-11-16 | Stop reason: HOSPADM

## 2022-11-12 RX ORDER — LORAZEPAM 1 MG/1
1 TABLET ORAL EVERY 12 HOURS PRN
Status: DISCONTINUED | OUTPATIENT
Start: 2022-11-12 | End: 2022-11-16 | Stop reason: HOSPADM

## 2022-11-12 RX ORDER — ONDANSETRON 2 MG/ML
4 INJECTION INTRAMUSCULAR; INTRAVENOUS EVERY 6 HOURS PRN
Status: DISCONTINUED | OUTPATIENT
Start: 2022-11-12 | End: 2022-11-16 | Stop reason: HOSPADM

## 2022-11-12 RX ORDER — POLYETHYLENE GLYCOL 3350 17 G/17G
17 POWDER, FOR SOLUTION ORAL DAILY PRN
Status: DISCONTINUED | OUTPATIENT
Start: 2022-11-12 | End: 2022-11-16 | Stop reason: HOSPADM

## 2022-11-12 RX ORDER — HEPARIN SODIUM 5000 [USP'U]/ML
5000 INJECTION, SOLUTION INTRAVENOUS; SUBCUTANEOUS 2 TIMES DAILY
Status: DISCONTINUED | OUTPATIENT
Start: 2022-11-12 | End: 2022-11-16 | Stop reason: HOSPADM

## 2022-11-12 RX ORDER — SODIUM CHLORIDE, SODIUM LACTATE, POTASSIUM CHLORIDE, CALCIUM CHLORIDE 600; 310; 30; 20 MG/100ML; MG/100ML; MG/100ML; MG/100ML
INJECTION, SOLUTION INTRAVENOUS CONTINUOUS
Status: DISCONTINUED | OUTPATIENT
Start: 2022-11-12 | End: 2022-11-13

## 2022-11-12 RX ORDER — SODIUM CHLORIDE 0.9 % (FLUSH) 0.9 %
5-40 SYRINGE (ML) INJECTION EVERY 12 HOURS SCHEDULED
Status: DISCONTINUED | OUTPATIENT
Start: 2022-11-12 | End: 2022-11-16 | Stop reason: HOSPADM

## 2022-11-12 RX ORDER — SODIUM CHLORIDE 9 MG/ML
INJECTION, SOLUTION INTRAVENOUS PRN
Status: DISCONTINUED | OUTPATIENT
Start: 2022-11-12 | End: 2022-11-16 | Stop reason: HOSPADM

## 2022-11-12 RX ORDER — FERROUS SULFATE 325(65) MG
325 TABLET ORAL
Status: DISCONTINUED | OUTPATIENT
Start: 2022-11-13 | End: 2022-11-16 | Stop reason: HOSPADM

## 2022-11-12 RX ORDER — ACETAMINOPHEN 325 MG/1
650 TABLET ORAL EVERY 6 HOURS PRN
Status: DISCONTINUED | OUTPATIENT
Start: 2022-11-12 | End: 2022-11-16 | Stop reason: HOSPADM

## 2022-11-12 RX ADMIN — HEPARIN SODIUM 5000 UNITS: 5000 INJECTION INTRAVENOUS; SUBCUTANEOUS at 21:49

## 2022-11-12 RX ADMIN — HYDROCODONE BITARTRATE AND ACETAMINOPHEN 1 TABLET: 5; 325 TABLET ORAL at 21:50

## 2022-11-12 RX ADMIN — LORAZEPAM 1 MG: 1 TABLET ORAL at 21:50

## 2022-11-12 RX ADMIN — DEXTROSE MONOHYDRATE 500 MG: 50 INJECTION, SOLUTION INTRAVENOUS at 15:47

## 2022-11-12 RX ADMIN — SODIUM CHLORIDE, POTASSIUM CHLORIDE, SODIUM LACTATE AND CALCIUM CHLORIDE: 600; 310; 30; 20 INJECTION, SOLUTION INTRAVENOUS at 21:56

## 2022-11-12 RX ADMIN — METOPROLOL TARTRATE 12.5 MG: 25 TABLET, FILM COATED ORAL at 21:50

## 2022-11-12 RX ADMIN — SODIUM CHLORIDE, PRESERVATIVE FREE 10 ML: 5 INJECTION INTRAVENOUS at 21:55

## 2022-11-12 ASSESSMENT — PAIN DESCRIPTION - DESCRIPTORS
DESCRIPTORS: ACHING
DESCRIPTORS: ACHING

## 2022-11-12 ASSESSMENT — PAIN - FUNCTIONAL ASSESSMENT: PAIN_FUNCTIONAL_ASSESSMENT: NONE - DENIES PAIN

## 2022-11-12 ASSESSMENT — ENCOUNTER SYMPTOMS
CHOKING: 1
DIARRHEA: 0
VOMITING: 0
ABDOMINAL PAIN: 1
EYE PAIN: 0
CONSTIPATION: 0
TROUBLE SWALLOWING: 0
CHEST TIGHTNESS: 0
EYE REDNESS: 0
NAUSEA: 1
BACK PAIN: 0
COUGH: 1
RHINORRHEA: 0
SHORTNESS OF BREATH: 1

## 2022-11-12 ASSESSMENT — PAIN DESCRIPTION - LOCATION
LOCATION: GENERALIZED
LOCATION: GENERALIZED

## 2022-11-12 ASSESSMENT — PAIN SCALES - GENERAL
PAINLEVEL_OUTOF10: 4
PAINLEVEL_OUTOF10: 4

## 2022-11-12 NOTE — ED NOTES
Attempt to call reports to Nadiya, whom is with a pt at this time.  Will call back ext #[phone] Faustino Anand RN  11/12/22 [id number]

## 2022-11-12 NOTE — ED PROVIDER NOTES
Emergency Department Encounter    Patient: Liss Roger  MRN: [id number]  : 1928  Date of Evaluation: 2022  ED Provider:  Leslie Carrion MD    Triage Chief Complaint:   Emesis, Fatigue, and Cough    Clinical Impression:  1. Pneumonia due to infectious organism, unspecified laterality, unspecified part of lung      Disposition referral (if applicable):  No follow-up provider specified. ED Provider Disposition Time  DISPOSITION Decision To Admit 2022 04:06:54 PM       MDM:  Patient presents with 1 day history of generalized weakness and nausea/vomiting as below. Patient has a leukocytosis to 22.3. Creatinine is approximately patient's baseline of 1.5 from 1.4 in 2021. No other significant electrolyte abnormalities. No evidence of acute hepatic, biliary, pancreatic disease by labs. There is no anemia. Lactate is not elevated. COVID-19 and influenza testing is negative. No evidence of urinary tract infection. Blood cultures obtained and pending. No evidence of ACS or malignant arrhythmia on EKG. Troponin is mildly elevated at 0.012. Patient denies chest pain. Chest x-ray with right perihilar airspace opacity consistent with pneumonia. Patient will be treated with antibiotics as below. Patient will be hospitalized for further observation, evaluation, and management. Patient verbalized understanding and agreement with plan.   Patient desires DO NOT RESUSCITATE Comfort Care arrest    Medications ordered in the ED:  ED Medication Orders (From admission, onward)      Start Ordered     Status Ordering Provider    22 0922  cefTRIAXone (ROCEPHIN) 1,000 mg in dextrose 5 % 50 mL IVPB mini-bag  EVERY 24 HOURS        Question Answer Comment   Antimicrobial Indications Pneumonia (CAP)    CAP duration of therapy 7 days        Last MAR action: Stopped - by Shirley Garcia on 22 at 2209 [facility], [address]    22 0922  azithromycin (ZITHROMAX) 500 mg in dextrose 5 % 250 mL IVPB ([id number])  EVERY 24 HOURS        Question Answer Comment   Antimicrobial Indications Pneumonia (CAP)    CAP duration of therapy 7 days        Last MAR action: Stopped - by Erinn Nguyen on 11/14/22 at 1148 [facility], [address]    11/13/22 0900 11/12/22 2048  vitamin B-12 (CYANOCOBALAMIN) tablet 1,000 mcg  DAILY         Last MAR action: Given - by Erinn Nguyen on 11/14/22 at [address]    11/13/22 0900 11/12/22 2048  atorvastatin (LIPITOR) tablet 20 mg  DAILY         Last MAR action: Given - by Erinn Nguyen on 11/14/22 at [facility]    11/13/22 0800 11/12/22 2048  ferrous sulfate (IRON 325) tablet 325 mg  DAILY WITH BREAKFAST         Last MAR action: Given - [facility] Erinn [facility]    11/13/22 0700 11/12/22 2048  pantoprazole (PROTONIX) tablet 40 mg  DAILY BEFORE BREAKFAST         Last MAR action: Given - by Erinn Nguyen on 11/14/22 at 213 [address]    11/12/22 2115 11/12/22 2048  metoprolol tartrate (LOPRESSOR) tablet 12.5 mg  2 TIMES DAILY         Last MAR action: Given - by Erinn Nguyen on 11/14/22 at 1560 [facility], [address] [place] [address]    11/12/22 2100 11/12/22 1928  sodium chloride flush 0.9 % injection 5-40 mL  2 times per day         Last MAR action: Given - by Erinn Nguyne on 11/14/22 at [address] [place] [address], [address] [place] [address]    11/12/22 2100 11/12/22 1928  heparin (porcine) injection 5,000 Units  2 TIMES DAILY         Last MAR action: Given - by Erinn Nguyen on 11/14/22 at [facility], [address] [place] [address]    11/12/22 2048 11/12/22 2048  HYDROcodone-acetaminophen (NORCO) 5-325 MG per tablet 1 tablet  EVERY 6 HOURS PRN         Last MAR action: Given - by Nadir Marie on 11/13/22 at 2018 [facility], [address]    11/12/22 2047 11/12/22 2048  LORazepam (ATIVAN) tablet 1 mg  EVERY 12 HOURS PRN         Last MAR action: Given - by Christophe Padilla on 11/13/22 at 18 [facility]    11/12/22 1927 11/12/22 1928  sodium chloride flush 0.9 % injection 5-40 mL  PRN Acknowledged [facility]    11/12/22 1927 11/12/22 1928  0.9 % sodium chloride infusion  PRN         Acknowledged HERRON, LOVE    11/12/22 1927 11/12/22 1928  ondansetron (ZOFRAN-ODT) disintegrating tablet 4 mg  EVERY 8 HOURS PRN        See Hyperspace for full Linked Orders Report. Acknowledged [facility]    11/12/22 1927 11/12/22 1928  ondansetron (ZOFRAN) injection 4 mg  EVERY 6 HOURS PRN        See Hyperspace for full Linked Orders Report. Acknowledged [facility]    11/12/22 1927 11/12/22 1928  polyethylene glycol (GLYCOLAX) packet 17 g  DAILY PRN         Acknowledged [facility]    11/12/22 1927 11/12/22 1928  acetaminophen (TYLENOL) tablet 650 mg  EVERY 6 HOURS PRN        See Hyperspace for full Linked Orders Report. [address] [place] [address]    11/12/22 1927 11/12/22 1928  acetaminophen (TYLENOL) suppository 650 mg  EVERY 6 HOURS PRN        See Hyperspace for full Linked Orders Report. Acknowledged [facility]    11/12/22 1530 11/12/22 1516  cefepime (MAXIPIME) 2000 mg IVPB minibag  ONCE        Question:  Antimicrobial Indications  Answer:  Pneumonia (HAP)    Acknowledged JOSE ANGEL MAIER    11/12/22 1530 11/12/22 1516  azithromycin (ZITHROMAX) 500 mg in dextrose 5 % 250 mL IVPB ([id number])  ONCE        Question:  Antimicrobial Indications  Answer:  Pneumonia (HAP)    Last MAR action: Stopped - by Haja Royal on 11/12/22 at [address] [place], [place] [address]JOSE ANGEL            Disposition medications (if applicable):  Current Discharge Medication List            HPI:  Ariadna Hough is a 80 y.o. female that presents complaining of 1 day history of generalized weakness and nausea/vomiting. Patient denies any abdominal pain or diarrhea. Patient denies urinary frequency, urgency, dysuria. No fever. Patient denies cough, chest pain, shortness of breath. Nurse reports that family member who dropped the patient off stated that there were other sick family members with similar symptoms. Symptoms are moderate.   No known alleviating or exacerbating factors. No treatment prior to arrival.    ROS - see HPI, below listed is current ROS at time of my eval:  General:  No fevers  Eyes:  No eye discharge  ENT:  No ear discharge  Cardiovascular:  No palpitations  Respiratory:  No wheezing  Gastrointestinal:  No hematemesis  Musculoskeletal:  No muscle pain  Skin:  No purpura  Neurologic:  No headache  Psychiatric:  No homicidal ideation  Genitourinary:  No hematuria  Endocrine:  No unexpected weight gain  Extremities:  No edema    Physical Exam:  Triage VS:    ED Triage Vitals   Enc Vitals Group      BP 11/12/22 1420 (!) 127/43      Heart Rate 11/12/22 1413 87      Resp 11/12/22 1413 22      Temp 11/12/22 1413 97.1 °F (36.2 °C)      Temp src --       SpO2 11/12/22 1413 92 %      Weight 11/12/22 1413 90 lb (40.8 kg)      Height 11/12/22 1413 5' 3\" (1.6 m)      Head Circumference --       Peak Flow --       Pain Score --       Pain Loc --       Pain Edu? --       Excl. in [address]? --          General appearance:  No acute distress. Skin:  Warm. Dry. Eye:  Extraocular movements intact. Ears, nose, mouth and throat: No oral lesions. Neck:  Trachea midline. Extremity:  No swelling. Normal ROM     Heart:  Regular rate and rhythm, normal S1 & S2, no extra heart sounds. Perfusion:  intact  Respiratory: Diffuse crackles throughout all lung fields, worse on the Right. Respirations nonlabored. Abdominal:  Normal bowel sounds. Soft. Nontender. Non distended. Back:  No CVA tenderness to palpation     Neurological:  Alert and oriented times 3. No focal neuro deficits.              Psychiatric:  Appropriate    Past Medical History:   Diagnosis Date    Acid reflux     Arthritis     CAD (coronary artery disease)     does not follow with cardiologist at this time    Cancer [facility])     skin cancer on her arm    CHF (congestive heart failure) ([address]     DDD (degenerative disc disease), cervical     \"uses walker\"    Dementia ([address]     Full dentures Gallstone     [facility] will not due surgery on this\"    [place] (hard of hearing)     \"right ear- no hearing aide\"    Hx of blood clots     \"had blood clot both legs few yrs ago\"\"use to be on Coumadin for this- off this now    Hyperlipidemia     Hypertension     PONV (postoperative nausea and vomiting)     UTI (urinary tract infection)     following with dr Domitila Black UTI- hospitalized for this twice- getting picc line to get antibiotics\" per son 9/12/2017    Wears glasses      Past Surgical History:   Procedure Laterality Date    BLADDER SUSPENSION  ? when    CARDIAC CATHETERIZATION      \"last one done approx 5 yrs ago in [place]\" per son 9/12/2017    CORONARY ARTERY BYPASS GRAFT  10+ yrs ago    \"done at [facility]\" son unsure # vessels    KIDNEY SURGERY      OTHER SURGICAL HISTORY      \"got hematomar right leg- removed June 2017\"    OTHER SURGICAL HISTORY Right 09/19/2017    removal tunnel catheter     Family History   Problem Relation Age of Onset    Breast Cancer Mother     Cancer Father         prostate     Social History     Socioeconomic History    Marital status:       Spouse name: Not on file    Number of children: Not on file    Years of education: Not on file    Highest education level: Not on file   Occupational History    Not on file   Tobacco Use    Smoking status: Never    Smokeless tobacco: Never   Vaping Use    Vaping Use: Never used   Substance and Sexual Activity    Alcohol use: No    Drug use: No    Sexual activity: Not on file   Other Topics Concern    Not on file   Social History Narrative    Not on file     Social Determinants of Health     Financial Resource Strain: Not on file   Food Insecurity: Not on file   Transportation Needs: Not on file   Physical Activity: Not on file   Stress: Not on file   Social Connections: Not on file   Intimate Partner Violence: Not on file   Housing Stability: Not on file     Current Facility-Administered Medications   Medication Dose Route Frequency Provider Last Rate Last Admin    cefepime (MAXIPIME) 2000 mg IVPB minibag  2,000 mg IntraVENous Once Leslie Carrion MD        sodium chloride flush 0.9 % injection 5-40 mL  5-40 mL IntraVENous 2 times per day Araceli Ramos MD   10 mL at 11/14/22 0851    sodium chloride flush 0.9 % injection 5-40 mL  5-40 mL IntraVENous PRN Araceli Ramos MD        0.9 % sodium chloride infusion   IntraVENous PRN Araceli Ramos MD        heparin (porcine) injection 5,000 Units  5,000 Units SubCUTAneous BID Araceli Ramos MD   5,000 Units at 11/14/22 0851    ondansetron (ZOFRAN-ODT) disintegrating tablet 4 mg  4 mg Oral Q8H PRN Araceli Ramos MD        Or    ondansetron [facility]) injection 4 mg  4 mg IntraVENous Q6H PRN Araceli Ramos MD        polyethylene glycol (GLYCOLAX) packet 17 g  17 g Oral Daily PRN Araceli Ramos MD        acetaminophen (TYLENOL) tablet 650 mg  650 mg Oral Q6H PRN Araceli Ramos MD        Or    acetaminophen (TYLENOL) suppository 650 mg  650 mg Rectal Q6H PRN Araceli Ramos MD        cefTRIAXone (ROCEPHIN) 1,000 mg in dextrose 5 % 50 mL IVPB mini-bag  1,000 mg IntraVENous Q24H Araceli Ramos MD   Stopped at 11/14/22 0957    azithromycin (ZITHROMAX) 500 mg in dextrose 5 % 250 mL IVPB ([id number])  500 mg IntraVENous Q24H Araceli Ramos MD   Stopped at 11/14/22 1148    vitamin B-12 (CYANOCOBALAMIN) tablet 1,000 mcg  1,000 mcg Oral Daily Araceli Ramos MD   1,000 mcg at 11/14/22 0854    ferrous sulfate (IRON 325) tablet 325 mg  325 mg Oral Daily with breakfast Araceli Ramos MD   325 mg at 11/14/22 0850    LORazepam (ATIVAN) tablet 1 mg  1 mg Oral Q12H PRN Araceli Ramos MD   1 mg at 11/13/22 1339    metoprolol tartrate (LOPRESSOR) tablet 12.5 mg  12.5 mg Oral BID Araceli Ramos MD   12.5 mg at 11/14/22 0851    pantoprazole (PROTONIX) tablet 40 mg  40 mg Oral [facility] Araceli Ramos MD   40 mg at 11/14/22 0854    atorvastatin (LIPITOR) tablet 20 mg  20 mg Oral Daily Araceli Ramos MD   20 mg at 11/14/22 0850    HYDROcodone-acetaminophen (NORCO) 5-325 MG per tablet 1 tablet  1 tablet Oral Q6H PRN Roby Ross MD   1 tablet at 11/13/22 2018     Allergies   Allergen Reactions    Ciprofloxacin Swelling    Ciprocinonide [Fluocinolone]     Sulfa Antibiotics Hives    Tylenol [Acetaminophen]      GI upset    Keflex [Cephalexin] Nausea And Vomiting    Methenamine Hippurate Nausea Only    Nitrofuran Derivatives Nausea And Vomiting     Macrodantin       Nursing Notes Reviewed    I have reviewed and interpreted all of the currently available lab results from this visit (if applicable):  Results for orders placed or performed during the hospital encounter of 11/12/22   COVID-19, Rapid    Specimen: Nasopharyngeal   Result Value Ref Range    Source NASOPHARYNGEAL SWAB     SARS-CoV-2, NAAT NOT DETECTED NOT DETECTED   Rapid Flu Swab    Specimen: Nasopharyngeal   Result Value Ref Range    Rapid Influenza A Ag NEGATIVE NEGATIVE    Rapid Influenza B Ag NEGATIVE NEGATIVE   Culture, Blood 1    Specimen: Blood   Result Value Ref Range    Specimen BLOOD     Special Requests NONE     Culture NO AEROBIC GROWTH AT 24 HOURS    Culture, Blood 2    Specimen: Blood   Result Value Ref Range    Specimen BLOOD     Special Requests NONE     Culture NO AEROBIC GROWTH AT 24 HOURS    CBC with Auto Differential   Result Value Ref Range    WBC 22.3 (H) 4.0 - 10.5 K/CU MM    RBC 4.43 4.2 - 5.4 M/CU MM    Hemoglobin 13.2 12.5 - 16.0 GM/DL    Hematocrit 41.6 37 - 47 %    MCV 93.9 78 - 100 FL    MCH 29.8 27 - 31 PG    MCHC 31.7 (L) 32.0 - 36.0 %    RDW 13.6 11.7 - 14.9 %    Platelets 645 507 - 719 K/CU MM    MPV 10.0 7.5 - 11.1 FL    Differential Type AUTOMATED DIFFERENTIAL     Segs Relative 89.7 (H) 36 - 66 %    Lymphocytes % 4.4 (L) 24 - 44 %    Monocytes % 4.4 (H) 0 - 4 %    Eosinophils % 0.1 0 - 3 %    Basophils % 0.4 0 - 1 %    Segs Absolute 20.0 K/CU MM    Lymphocytes Absolute 1.0 K/CU MM    Monocytes Absolute 1.0 K/CU MM    Eosinophils Absolute 0.0 K/CU MM    Basophils Absolute 0.1 K/CU MM    Immature Neutrophil % 1.0 (H) 0 - 0.43 %    Total Immature Neutrophil 0.23 K/CU MM   Comprehensive Metabolic Panel   Result Value Ref Range    Sodium 137 135 - 145 MMOL/L    Potassium 4.3 3.5 - 5.1 MMOL/L    Chloride 99 99 - 110 mMol/L    CO2 25 21 - 32 MMOL/L    BUN 33 (H) 6 - 23 MG/DL    Creatinine 1.5 (H) 0.6 - 1.1 MG/DL    Est, Glom Filt Rate 32 (L) >60 mL/min/1.73m2    Glucose 101 (H) 70 - 99 MG/DL    Calcium 9.3 8.3 - 10.6 MG/DL    Albumin 3.7 3.4 - 5.0 GM/DL    Total Protein 7.1 6.4 - 8.2 GM/DL    Total Bilirubin 0.5 0.0 - 1.0 MG/DL    ALT 11 10 - 40 U/L    AST 21 15 - 37 IU/L    Alkaline Phosphatase 56 40 - 129 IU/L    Anion Gap 13 4 - 16   Lipase   Result Value Ref Range    Lipase 24 13 - 60 IU/L   Urinalysis   Result Value Ref Range    Color, UA YELLOW YELLOW    Clarity, UA CLEAR CLEAR    Glucose, Urine NEGATIVE NEGATIVE MG/DL    Bilirubin Urine NEGATIVE NEGATIVE MG/DL    Ketones, Urine NEGATIVE NEGATIVE MG/DL    Specific Gravity, UA <1.005 1.001 - 1.035    Blood, Urine TRACE (A) NEGATIVE    pH, Urine 6.5 5.0 - 8.0    Protein, UA NEGATIVE NEGATIVE MG/DL    Urobilinogen, Urine 0.2 0.2 - 1.0 MG/DL    Nitrite Urine, Quantitative NEGATIVE NEGATIVE    Leukocyte Esterase, Urine NEGATIVE NEGATIVE   Troponin   Result Value Ref Range    Troponin T 0.012 (H) <0.01 NG/ML   Lactate, Sepsis   Result Value Ref Range    Lactic Acid, Sepsis 1.1 0.5 - 1.9 mMOL/L   Microscopic Urinalysis   Result Value Ref Range    RBC, UA 1 0 - 6 /HPF    WBC, UA 1 0 - 5 /HPF    Epithelial Cells, UA 1 /HPF    Cast Type NO CAST FORMS SEEN NO CAST FORMS SEEN /HPF    Bacteria, UA RARE (A) NEGATIVE /HPF    Crystal Type NEGATIVE NEGATIVE /HPF   Comprehensive Metabolic Panel w/ Reflex to MG   Result Value Ref Range    Sodium 137 135 - 145 MMOL/L    Potassium 4.4 3.5 - 5.1 MMOL/L    Chloride 101 99 - 110 mMol/L    CO2 25 21 - 32 MMOL/L    BUN 34 (H) 6 - 23 MG/DL    Creatinine 1.7 (H) 0.6 - 1.1 MG/DL    Est, Glom Filt Rate 28 (L) >60 mL/min/1.73m2    Glucose 77 70 - 99 MG/DL Calcium 8.5 8.3 - 10.6 MG/DL    Albumin 3.2 (L) 3.4 - 5.0 GM/DL    Total Protein 5.4 (L) 6.4 - 8.2 GM/DL    Total Bilirubin 0.6 0.0 - 1.0 MG/DL    ALT 10 10 - 40 U/L    AST 18 15 - 37 IU/L    Alkaline Phosphatase 52 40 - 128 IU/L    Anion Gap 11 4 - 16   CBC with Auto Differential   Result Value Ref Range    WBC 17.0 (H) 4.0 - 10.5 K/CU MM    RBC 3.61 (L) 4.2 - 5.4 M/CU MM    Hemoglobin 11.0 (L) 12.5 - 16.0 GM/DL    Hematocrit 35.0 (L) 37 - 47 %    MCV 97.0 78 - 100 FL    MCH 30.5 27 - 31 PG    MCHC 31.4 (L) 32.0 - 36.0 %    RDW 13.9 11.7 - 14.9 %    Platelets 387 714 - 771 K/CU MM    MPV 10.6 7.5 - 11.1 FL    Differential Type AUTOMATED DIFFERENTIAL     Segs Relative 79.5 (H) 36 - 66 %    Lymphocytes % 12.9 (L) 24 - 44 %    Monocytes % 6.4 (H) 0 - 4 %    Eosinophils % 0.5 0 - 3 %    Basophils % 0.4 0 - 1 %    Segs Absolute 13.5 K/CU MM    Lymphocytes Absolute 2.2 K/CU MM    Monocytes Absolute 1.1 K/CU MM    Eosinophils Absolute 0.1 K/CU MM    Basophils Absolute 0.1 K/CU MM    Nucleated RBC % 0.0 %    Total Nucleated RBC 0.0 K/CU MM    Total Immature Neutrophil 0.05 K/CU MM    Immature Neutrophil % 0.3 0 - 0.43 %   Procalcitonin   Result Value Ref Range    Procalcitonin 5.42    Troponin   Result Value Ref Range    Troponin T <0.010 <0.01 NG/ML   Basic Metabolic Panel w/ Reflex to MG   Result Value Ref Range    Sodium 130 (L) 135 - 145 MMOL/L    Potassium 4.4 3.5 - 5.1 MMOL/L    Chloride 98 (L) 99 - 110 mMol/L    CO2 26 21 - 32 MMOL/L    Anion Gap 6 4 - 16    BUN 35 (H) 6 - 23 MG/DL    Creatinine 1.4 (H) 0.6 - 1.1 MG/DL    Est, Glom Filt Rate 35 (L) >60 mL/min/1.73m2    Glucose 78 70 - 99 MG/DL    Calcium 8.8 8.3 - 10.6 MG/DL   CBC with Auto Differential   Result Value Ref Range    WBC 11.3 (H) 4.0 - 10.5 K/CU MM    RBC 3.76 (L) 4.2 - 5.4 M/CU MM    Hemoglobin 11.3 (L) 12.5 - 16.0 GM/DL    Hematocrit 35.9 (L) 37 - 47 %    MCV 95.5 78 - 100 FL    MCH 30.1 27 - 31 PG    MCHC 31.5 (L) 32.0 - 36.0 %    RDW 13.7 11.7 - 14.9 %    Platelets 627 959 - 640 K/CU MM    MPV 10.8 7.5 - 11.1 FL    Differential Type AUTOMATED DIFFERENTIAL     Segs Relative 78.6 (H) 36 - 66 %    Lymphocytes % 11.9 (L) 24 - 44 %    Monocytes % 8.1 (H) 0 - 4 %    Eosinophils % 0.6 0 - 3 %    Basophils % 0.4 0 - 1 %    Segs Absolute 8.9 K/CU MM    Lymphocytes Absolute 1.3 K/CU MM    Monocytes Absolute 0.9 K/CU MM    Eosinophils Absolute 0.1 K/CU MM    Basophils Absolute 0.1 K/CU MM    Nucleated RBC % 0.0 %    Total Nucleated RBC 0.0 K/CU MM    Total Immature Neutrophil 0.04 K/CU MM    Immature Neutrophil % 0.4 0 - 0.43 %   EKG 12 Lead   Result Value Ref Range    Ventricular Rate 78 BPM    Atrial Rate 78 BPM    P-R Interval 146 ms    QRS Duration 80 ms    Q-T Interval 414 ms    QTc Calculation (Bazett) 471 ms    P Axis 41 degrees    R Axis 7 degrees    T Axis 35 degrees    Diagnosis       Normal sinus rhythm with sinus arrhythmia  Possible Left atrial enlargement  Possible Anterior infarct , age undetermined  Abnormal ECG  No previous ECGs available  Confirmed by DANNIELLE Patel ([id number]) on 11/13/2022 6:17:26 PM        Radiographs (if obtained):  Radiologist's Report Reviewed:  XR CHEST PORTABLE   Final Result   Right parahilar airspace opacity could represent pneumonia      Hiatal hernia             EKG (if obtained): (All EKG's are interpreted by myself in the absence of a cardiologist)    Rate 78, sinus rhythm, normal axis, normal intervals, T wave inversion in V1 and V2 but otherwise no acute ST-T wave changes. Comment: Please note this report has been produced using speech recognition software and may contain errors related to that system including errors in grammar, punctuation, and spelling, as well as words and phrases that may be inappropriate. Efforts were made to edit the dictations.         Isaiah Cain MD  11/14/22 6205

## 2022-11-12 NOTE — ED NOTES
Left message for the patients son Bryant Ayers regarding admission status to [address] [place] [address]  11/12/22 8915

## 2022-11-12 NOTE — ED NOTES
The patients son is at the bedside now and reports that the patient has \"Sundowners. \"        Ever Lynch RN  11/12/22 0355

## 2022-11-13 LAB
ALBUMIN SERPL-MCNC: 3.2 GM/DL (ref 3.4–5)
ALP BLD-CCNC: 52 IU/L (ref 40–128)
ALT SERPL-CCNC: 10 U/L (ref 10–40)
ANION GAP SERPL CALCULATED.3IONS-SCNC: 11 MMOL/L (ref 4–16)
AST SERPL-CCNC: 18 IU/L (ref 15–37)
BASOPHILS ABSOLUTE: 0.1 K/CU MM
BASOPHILS RELATIVE PERCENT: 0.4 % (ref 0–1)
BILIRUB SERPL-MCNC: 0.6 MG/DL (ref 0–1)
BUN BLDV-MCNC: 34 MG/DL (ref 6–23)
CALCIUM SERPL-MCNC: 8.5 MG/DL (ref 8.3–10.6)
CHLORIDE BLD-SCNC: 101 MMOL/L (ref 99–110)
CO2: 25 MMOL/L (ref 21–32)
CREAT SERPL-MCNC: 1.7 MG/DL (ref 0.6–1.1)
DIFFERENTIAL TYPE: ABNORMAL
EKG ATRIAL RATE: 78 BPM
EKG DIAGNOSIS: NORMAL
EKG P AXIS: 41 DEGREES
EKG P-R INTERVAL: 146 MS
EKG Q-T INTERVAL: 414 MS
EKG QRS DURATION: 80 MS
EKG QTC CALCULATION (BAZETT): 471 MS
EKG R AXIS: 7 DEGREES
EKG T AXIS: 35 DEGREES
EKG VENTRICULAR RATE: 78 BPM
EOSINOPHILS ABSOLUTE: 0.1 K/CU MM
EOSINOPHILS RELATIVE PERCENT: 0.5 % (ref 0–3)
GFR SERPL CREATININE-BSD FRML MDRD: 28 ML/MIN/1.73M2
GLUCOSE BLD-MCNC: 77 MG/DL (ref 70–99)
HCT VFR BLD CALC: 35 % (ref 37–47)
HEMOGLOBIN: 11 GM/DL (ref 12.5–16)
IMMATURE NEUTROPHIL %: 0.3 % (ref 0–0.43)
LYMPHOCYTES ABSOLUTE: 2.2 K/CU MM
LYMPHOCYTES RELATIVE PERCENT: 12.9 % (ref 24–44)
MCH RBC QN AUTO: 30.5 PG (ref 27–31)
MCHC RBC AUTO-ENTMCNC: 31.4 % (ref 32–36)
MCV RBC AUTO: 97 FL (ref 78–100)
MONOCYTES ABSOLUTE: 1.1 K/CU MM
MONOCYTES RELATIVE PERCENT: 6.4 % (ref 0–4)
NUCLEATED RBC %: 0 %
PDW BLD-RTO: 13.9 % (ref 11.7–14.9)
PLATELET # BLD: 174 K/CU MM (ref 140–440)
PMV BLD AUTO: 10.6 FL (ref 7.5–11.1)
POTASSIUM SERPL-SCNC: 4.4 MMOL/L (ref 3.5–5.1)
PROCALCITONIN: 5.42
RBC # BLD: 3.61 M/CU MM (ref 4.2–5.4)
SEGMENTED NEUTROPHILS ABSOLUTE COUNT: 13.5 K/CU MM
SEGMENTED NEUTROPHILS RELATIVE PERCENT: 79.5 % (ref 36–66)
SODIUM BLD-SCNC: 137 MMOL/L (ref 135–145)
TOTAL IMMATURE NEUTOROPHIL: 0.05 K/CU MM
TOTAL NUCLEATED RBC: 0 K/CU MM
TOTAL PROTEIN: 5.4 GM/DL (ref 6.4–8.2)
TROPONIN T: <0.01 NG/ML
WBC # BLD: 17 K/CU MM (ref 4–10.5)

## 2022-11-13 PROCEDURE — 2580000003 HC RX 258: Performed by: STUDENT IN AN ORGANIZED HEALTH CARE EDUCATION/TRAINING PROGRAM

## 2022-11-13 PROCEDURE — 97166 OT EVAL MOD COMPLEX 45 MIN: CPT

## 2022-11-13 PROCEDURE — 84484 ASSAY OF TROPONIN QUANT: CPT

## 2022-11-13 PROCEDURE — 93010 ELECTROCARDIOGRAM REPORT: CPT | Performed by: INTERNAL MEDICINE

## 2022-11-13 PROCEDURE — 97116 GAIT TRAINING THERAPY: CPT

## 2022-11-13 PROCEDURE — 6360000002 HC RX W HCPCS: Performed by: STUDENT IN AN ORGANIZED HEALTH CARE EDUCATION/TRAINING PROGRAM

## 2022-11-13 PROCEDURE — 97163 PT EVAL HIGH COMPLEX 45 MIN: CPT

## 2022-11-13 PROCEDURE — 36415 COLL VENOUS BLD VENIPUNCTURE: CPT

## 2022-11-13 PROCEDURE — 2700000000 HC OXYGEN THERAPY PER DAY

## 2022-11-13 PROCEDURE — 97530 THERAPEUTIC ACTIVITIES: CPT

## 2022-11-13 PROCEDURE — 80053 COMPREHEN METABOLIC PANEL: CPT

## 2022-11-13 PROCEDURE — 1200000000 HC SEMI PRIVATE

## 2022-11-13 PROCEDURE — 6370000000 HC RX 637 (ALT 250 FOR IP): Performed by: STUDENT IN AN ORGANIZED HEALTH CARE EDUCATION/TRAINING PROGRAM

## 2022-11-13 PROCEDURE — 85025 COMPLETE CBC W/AUTO DIFF WBC: CPT

## 2022-11-13 PROCEDURE — 94761 N-INVAS EAR/PLS OXIMETRY MLT: CPT

## 2022-11-13 RX ADMIN — HYDROCODONE BITARTRATE AND ACETAMINOPHEN 1 TABLET: 5; 325 TABLET ORAL at 20:18

## 2022-11-13 RX ADMIN — METOPROLOL TARTRATE 12.5 MG: 25 TABLET, FILM COATED ORAL at 20:19

## 2022-11-13 RX ADMIN — SODIUM CHLORIDE, PRESERVATIVE FREE 10 ML: 5 INJECTION INTRAVENOUS at 09:02

## 2022-11-13 RX ADMIN — CEFTRIAXONE SODIUM 1000 MG: 1 INJECTION, POWDER, FOR SOLUTION INTRAMUSCULAR; INTRAVENOUS at 10:42

## 2022-11-13 RX ADMIN — ATORVASTATIN CALCIUM 20 MG: 10 TABLET, FILM COATED ORAL at 08:58

## 2022-11-13 RX ADMIN — DEXTROSE MONOHYDRATE 500 MG: 50 INJECTION, SOLUTION INTRAVENOUS at 09:06

## 2022-11-13 RX ADMIN — PANTOPRAZOLE SODIUM 40 MG: 40 TABLET, DELAYED RELEASE ORAL at 09:01

## 2022-11-13 RX ADMIN — CYANOCOBALAMIN TAB 1000 MCG 1000 MCG: 1000 TAB at 08:57

## 2022-11-13 RX ADMIN — LORAZEPAM 1 MG: 1 TABLET ORAL at 13:39

## 2022-11-13 RX ADMIN — HYDROCODONE BITARTRATE AND ACETAMINOPHEN 1 TABLET: 5; 325 TABLET ORAL at 14:25

## 2022-11-13 RX ADMIN — FERROUS SULFATE TAB 325 MG (65 MG ELEMENTAL FE) 325 MG: 325 (65 FE) TAB at 08:58

## 2022-11-13 RX ADMIN — HEPARIN SODIUM 5000 UNITS: 5000 INJECTION INTRAVENOUS; SUBCUTANEOUS at 21:04

## 2022-11-13 RX ADMIN — HEPARIN SODIUM 5000 UNITS: 5000 INJECTION INTRAVENOUS; SUBCUTANEOUS at 08:57

## 2022-11-13 RX ADMIN — METOPROLOL TARTRATE 12.5 MG: 25 TABLET, FILM COATED ORAL at 08:58

## 2022-11-13 ASSESSMENT — PAIN DESCRIPTION - LOCATION
LOCATION: BACK
LOCATION: GENERALIZED

## 2022-11-13 ASSESSMENT — PAIN SCALES - GENERAL
PAINLEVEL_OUTOF10: 6
PAINLEVEL_OUTOF10: 10

## 2022-11-13 ASSESSMENT — PAIN DESCRIPTION - DESCRIPTORS: DESCRIPTORS: ACHING

## 2022-11-13 NOTE — H&P
V2.0  History and Physical      Name:  Roosevelt Nicole /Age/Sex: 1928  (80 y.o. female)   MRN & CSN:  [id number] & [id number] Encounter Date/Time: 2022 9:05 PM EST   Location:  -A PCP: [address] [place] [address] Day: 1    Assessment and Plan:   Roosevelt Nicole is a 80 y.o. female with a pmh of hypertension, hyperlipidemia, GERD, arthritis who presents with generalized fatigue. Hospital Problems             Last Modified POA    * (Principal) Pneumonia symptoms 2022 Yes    Community acquired pneumonia due to Chlamydia species 2022 Yes     Sepsis secondary to community-acquired pneumonia:  -Respiratory rate 22, WBC 22.3, with chest x-ray suggestive of right-sided pneumonia  -Received azithromycin and cefepime in ER, will continue Rocephin and azithromycin  -Ordered blood culture, procalcitonin, urine strep/Legionella antigen  -Lactated Ringer's at 75 cc/h for 12 hours  -On room air saturating well  -Bedside swallow eval.    Generalized weakness:  -Likely secondary to pneumonia, PT OT consult    Chronic kidney disease stage IIIa  -Unsure what baseline creatinine is, continue lactated Ringer's, avoid nephrotoxins and repeat BMP in a.m. Hypertension: Continue home dose of metoprolol, hold Lasix    Severe osteoarthritis: On Norco at home continue as needed Norco.    Elevated troponin: Trend troponin x3  -Likely demand ischemia secondary to pneumonia, no chest pain, EKG's showed no ST-T wave changes, and normal sinus rhythm              Disposition:   Current Living situation: Home with son  Expected Disposition: Home versus SNF  Estimated D/C: 2-3 days    Diet ADULT DIET; Regular; 3 carb choices (45 gm/meal);  Low Fat/Low Chol/High Fiber/CARIE; Low Sodium (2 gm)   DVT Prophylaxis [] Lovenox, [x]  Heparin, [] SCDs, [] Ambulation,  [] Eliquis, [] Xarelto   Code Status Full Code   Surrogate Decision Maker/ POA Son     History from:     patient    History of Present Illness:     Chief Complaint: Pneumonia symptoms  Ruchi Lancaster is a 80 y.o. female with pmh of hypertension, hyperlipidemia, GERD, arthritis who presents with weakness ongoing for the last 3 to 4 days. She states that she has been having generalized weakness for the last 3 to 4 days, also felt sick to her stomach, had some nausea and abdominal pain but no episodes of vomiting. Was able to keep the food down. She also complains of minimal dry cough with some shortness of breath. Complains of chills all the time but denies any fever. She denies any sick contacts. Also complains of bilateral leg pain which she feels that is due to her neuropathy and requesting medication for that. Review of Systems: Need 10 Elements   Review of Systems   Constitutional:  Positive for activity change, appetite change and fatigue. Negative for fever. HENT:  Negative for congestion, rhinorrhea and trouble swallowing. Eyes:  Negative for pain and redness. Respiratory:  Positive for cough, choking and shortness of breath. Negative for chest tightness. Cardiovascular:  Positive for leg swelling. Negative for chest pain and palpitations. Gastrointestinal:  Positive for abdominal pain and nausea. Negative for constipation, diarrhea and vomiting. Genitourinary:  Negative for difficulty urinating and dysuria. Musculoskeletal:  Negative for back pain and myalgias. Skin:  Negative for rash. Neurological:  Negative for dizziness, syncope, light-headedness, numbness and headaches. Psychiatric/Behavioral:  Negative for confusion.       Objective:   No intake or output data in the 24 hours ending 11/12/22 2105   Vitals:   Vitals:    11/12/22 1420 11/12/22 1549 11/12/22 1700 11/12/22 1839   BP: (!) 127/43 (!) 123/42 (!) 118/52 (!) 101/49   Pulse:  76 78 78   Resp:  14  16   Temp:    98.4 °F (36.9 °C)   TempSrc:    Oral   SpO2: 92% 93% 93% 92%   Weight:       Height:           Medications Prior to Admission     Prior to Admission Nose: Nose normal.      Mouth/Throat:      Mouth: Mucous membranes are dry. Pharynx: Oropharynx is clear. Eyes:      Pupils: Pupils are equal, round, and reactive to light. Cardiovascular:      Rate and Rhythm: Normal rate and regular rhythm. Pulses: Normal pulses. Heart sounds: Normal heart sounds. No murmur heard. No friction rub. No gallop. Pulmonary:      Effort: Pulmonary effort is normal. No respiratory distress. Breath sounds: Normal breath sounds. No wheezing or rales. Comments: Fine crackles in the right midlung zone. Abdominal:      General: Abdomen is flat. Bowel sounds are normal. There is no distension. Palpations: Abdomen is soft. Tenderness: There is no abdominal tenderness. There is no guarding. Musculoskeletal:         General: Swelling (Bilateral lower extremity nonpitting edema) present. No tenderness or deformity. Normal range of motion. Cervical back: Normal range of motion and neck supple. Right lower leg: No edema. Left lower leg: No edema. Skin:     General: Skin is warm. Coloration: Skin is not jaundiced or pale. Findings: No bruising, erythema, lesion or rash. Neurological:      General: No focal deficit present. Mental Status: She is alert and oriented to person, place, and time. Mental status is at baseline. Cranial Nerves: No cranial nerve deficit. Sensory: No sensory deficit. Motor: No weakness.       Gait: Gait normal.   Psychiatric:         Mood and Affect: Mood normal.          Past History:   PMHx   Past Medical History:   Diagnosis Date    Acid reflux     Arthritis     CAD (coronary artery disease)     does not follow with cardiologist at this time    Cancer [facility])     skin cancer on her arm    CHF (congestive heart failure) ([facility] [address]     DDD (degenerative disc disease), cervical     \"uses walker\"    Dementia ([address]     Full dentures     Gallstone     \"they will not due surgery on this\"    [facility] (hard of hearing)     \"right ear- no hearing aide\"    Hx of blood clots     \"had blood clot both legs few yrs ago\"\"use to be on Coumadin for this- off this now    Hyperlipidemia     Hypertension     PONV (postoperative nausea and vomiting)     UTI (urinary tract infection)     following with dr Domitila Black UTI- hospitalized for this twice- getting picc line to get antibiotics\" per son 9/12/2017    Wears glasses         PSHX:  has a past surgical history that includes Kidney surgery; Coronary artery bypass graft (10+ yrs ago); bladder suspension (?when); other surgical history; Cardiac catheterization; and other surgical history (Right, 09/19/2017). Fam HX: family history includes Breast Cancer in her mother; Cancer in her father. Soc HX:   Social History     Socioeconomic History    Marital status:      Spouse name: None    Number of children: None    Years of education: None    Highest education level: None   Tobacco Use    Smoking status: Never    Smokeless tobacco: Never   Vaping Use    Vaping Use: Never used   Substance and Sexual Activity    Alcohol use: No    Drug use: No       Allergies: Allergies:    Allergies   Allergen Reactions    Ciprofloxacin Swelling    Ciprocinonide [Fluocinolone]     Sulfa Antibiotics Hives    Tylenol [Acetaminophen]      GI upset    Keflex [Cephalexin] Nausea And Vomiting    Methenamine Hippurate Nausea Only    Nitrofuran Derivatives Nausea And Vomiting     Macrodantin       Medications:   Medications:    cefepime  2,000 mg IntraVENous Once    sodium chloride flush  5-40 mL IntraVENous 2 times per day    heparin (porcine)  5,000 Units SubCUTAneous BID    [START ON 11/13/2022] cefTRIAXone (ROCEPHIN) IV  1,000 mg IntraVENous Q24H    [START ON 11/13/2022] azithromycin  500 mg IntraVENous Q24H    [START ON 11/13/2022] cyanocobalamin  1,000 mcg Oral Daily    [START ON 11/13/2022] ferrous sulfate  325 mg Oral Daily with breakfast    metoprolol tartrate  12.5 mg Oral BID    [START ON 11/13/2022] pantoprazole  40 mg Oral QAM AC    [START ON 11/13/2022] atorvastatin  20 mg Oral Daily      Infusions:    sodium chloride      lactated ringers       PRN Meds: sodium chloride flush, 5-40 mL, PRN  sodium chloride, , PRN  ondansetron, 4 mg, Q8H PRN   Or  ondansetron, 4 mg, Q6H PRN  polyethylene glycol, 17 g, Daily PRN  acetaminophen, 650 mg, Q6H PRN   Or  acetaminophen, 650 mg, Q6H PRN  LORazepam, 1 mg, Q12H PRN  HYDROcodone-acetaminophen, 1 tablet, Q6H PRN        Labs      CBC:   Recent Labs     11/12/22  1422   WBC 22.3*   HGB 13.2        BMP:    Recent Labs     11/12/22  1422      K 4.3   CL 99   CO2 25   BUN 33*   CREATININE 1.5*   GLUCOSE 101*     Hepatic:   Recent Labs     11/12/22  1422   AST 21   ALT 11   BILITOT 0.5   ALKPHOS 56     Lipids: No results found for: CHOL, HDL, TRIG  Hemoglobin A1C: No results found for: LABA1C  TSH: No results found for: TSH  Troponin:   Lab Results   Component Value Date/Time    TROPONINT 0.012 11/12/2022 02:22 PM     Lactic Acid: No results for input(s): LACTA in the last 72 hours. BNP: No results for input(s): PROBNP in the last 72 hours.   UA:  Lab Results   Component Value Date/Time    NITRU NEGATIVE 11/12/2022 03:22 PM    COLORU YELLOW 11/12/2022 03:22 PM    WBCUA 1 11/12/2022 03:22 PM    RBCUA 1 11/12/2022 03:22 PM    BACTERIA RARE 11/12/2022 03:22 PM    CLARITYU CLEAR 11/12/2022 03:22 PM    [address] <1.005 11/12/2022 03:22 PM    LEUKOCYTESUR NEGATIVE 11/12/2022 03:22 PM    UROBILINOGEN 0.2 11/12/2022 03:22 PM    BILIRUBINUR NEGATIVE 11/12/2022 03:22 PM    BLOODU TRACE 11/12/2022 03:22 PM    KETUA NEGATIVE 11/12/2022 03:22 PM     Urine Cultures: No results found for: Danielle Platt  Blood Cultures: No results found for: BC  No results found for: BLOODCULT2  Organism: No results found for: ORG    Imaging/Diagnostics Last 24 Hours   XR CHEST PORTABLE    Result Date: 11/12/2022  EXAMINATION: ONE XRAY VIEW OF THE CHEST 11/12/2022 2:48 pm COMPARISON: None. HISTORY: ORDERING SYSTEM PROVIDED HISTORY: weak for 1 day TECHNOLOGIST PROVIDED HISTORY: Reason for exam:->weak for 1 day Reason for Exam: weak for 1 day FINDINGS: Status post median sternotomy. Heart size within normal limits. Right parahilar airspace opacity. No pneumothorax. Retrocardiac density suggesting a hiatal hernia.      Right parahilar airspace opacity could represent pneumonia Hiatal hernia       Electronically signed by Aryan Zhao MD on 11/12/2022 at 9:05 PM

## 2022-11-13 NOTE — PROGRESS NOTES
V2.0  [facility] Hospitalist Progress Note      Name:  Moreno Ball /Age/Sex: 1928  (80 y.o. female)   MRN & CSN:  [id number] & [id number] Encounter Date/Time: 2022 11:31 AM EST    Location:  -A PCP: [address] [place] [address] Day: 2    Assessment and Plan:   Moreno Ball is a 80 y.o. female with pmh of hypertension, hyperlipidemia, GERD and arthritis who presents with Pneumonia symptoms      # Sepsis secondary to community-acquired pneumonia:  - Respiratory rate 22, WBC 22.3--->17.0, with chest x-ray suggestive of right-sided pneumonia, Pro-Evgeny 5.42, lactic acid 1.1  - continue Rocephin and azithromycin  - Follow-up blood culture, urine strep/Legionella antigen-pending  - On room air saturating well     # Generalized weakness:  - Likely secondary to pneumonia, PT OT consult ending evaluation     # Chronic kidney disease stage IIIa  - Unsure what baseline creatinine is,   - Given IV fluids patient's creatinine increased to 1.7, UA negative, obtain bladder scan, avoid nephrotoxins Will repeat BMP this evening if continue to trend will obtain nephrology consult     # Hypertension: Continue home dose of metoprolol, hold Lasix     # Severe osteoarthritis: On Norco at home continue as needed Norco.     # Elevated troponin: Trended down  - Likely demand ischemia secondary to pneumonia, no chest pain, EKG's showed no ST-T wave changes, and normal sinus rhythm       Diet ADULT DIET; Regular; 3 carb choices (45 gm/meal); Low Fat/Low Chol/High Fiber/CARIE; Low Sodium (2 gm)   DVT Prophylaxis [] Lovenox, [x]  Heparin, [] SCDs, [] Ambulation,  [] Eliquis, [] Xarelto  [] Coumadin   Code Status DNR-CCA   Disposition From: Home  Expected Disposition: Home versus SNF  Estimated Date of Discharge: TBD  Patient requires continued admission due to sepsis   Surrogate Decision Maker/ POA Son     Subjective:     Chief Complaint: Emesis, Fatigue, and Cough       Patient seen and examined at bedside.   Patient sitting on the chair at bedside in no distress states she has no complaints hold feeling weak         Review of Systems:    Review of Systems    Constitutional: No fever no chills but weakness  HEENT: No headache no dizziness  Cardiovascular: No chest pain no palpitations  Respiratory: No cough no shortness of breath  Abdomen: No diarrhea, no nausea, no vomiting, no abdominal pain  Musculoskeletal: No swelling  Neuro: No numbness or tingling  Skin: No rash      Objective: Intake/Output Summary (Last 24 hours) at 11/13/2022 1131  Last data filed at 11/13/2022 0900  Gross per 24 hour   Intake 120 ml   Output --   Net 120 ml        Vitals:   Vitals:    11/13/22 0900   BP: (!) 129/58   Pulse: 54   Resp: 14   Temp: 98.7 °F (37.1 °C)   SpO2: 90%       Physical Exam:     General: Afebrile, no distress  Eyes: EOMI  ENT: neck supple  Cardiovascular: S1-S2 normal  Respiratory: Air entry good bilaterally no wheezing no crackles  Gastrointestinal: Soft, non tender bowel sounds normal  Genitourinary: no suprapubic tenderness  Musculoskeletal: No edema  Skin: warm, dry  Neuro: Alert. Oriented no focal deficit  Psych: Mood appropriate.      Medications:   Medications:    cefepime  2,000 mg IntraVENous Once    sodium chloride flush  5-40 mL IntraVENous 2 times per day    heparin (porcine)  5,000 Units SubCUTAneous BID    cefTRIAXone (ROCEPHIN) IV  1,000 mg IntraVENous Q24H    azithromycin  500 mg IntraVENous Q24H    cyanocobalamin  1,000 mcg Oral Daily    ferrous sulfate  325 mg Oral Daily with breakfast    metoprolol tartrate  12.5 mg Oral BID    pantoprazole  40 mg Oral QAM AC    atorvastatin  20 mg Oral Daily      Infusions:    sodium chloride       PRN Meds: sodium chloride flush, 5-40 mL, PRN  sodium chloride, , PRN  ondansetron, 4 mg, Q8H PRN   Or  ondansetron, 4 mg, Q6H PRN  polyethylene glycol, 17 g, Daily PRN  acetaminophen, 650 mg, Q6H PRN   Or  acetaminophen, 650 mg, Q6H PRN  LORazepam, 1 mg, Q12H PRN  HYDROcodone-acetaminophen, 1 tablet, Q6H PRN        Labs      Recent Results (from the past 24 hour(s))   CBC with Auto Differential    Collection Time: 11/12/22  2:22 PM   Result Value Ref Range    WBC 22.3 (H) 4.0 - 10.5 K/CU MM    RBC 4.43 4.2 - 5.4 M/CU MM    Hemoglobin 13.2 12.5 - 16.0 GM/DL    Hematocrit 41.6 37 - 47 %    MCV 93.9 78 - 100 FL    MCH 29.8 27 - 31 PG    MCHC 31.7 (L) 32.0 - 36.0 %    RDW 13.6 11.7 - 14.9 %    Platelets 230 109 - 737 K/CU MM    MPV 10.0 7.5 - 11.1 FL    Differential Type AUTOMATED DIFFERENTIAL     Segs Relative 89.7 (H) 36 - 66 %    Lymphocytes % 4.4 (L) 24 - 44 %    Monocytes % 4.4 (H) 0 - 4 %    Eosinophils % 0.1 0 - 3 %    Basophils % 0.4 0 - 1 %    Segs Absolute 20.0 K/CU MM    Lymphocytes Absolute 1.0 K/CU MM    Monocytes Absolute 1.0 K/CU MM    Eosinophils Absolute 0.0 K/CU MM    Basophils Absolute 0.1 K/CU MM    Immature Neutrophil % 1.0 (H) 0 - 0.43 %    Total Immature Neutrophil 0.23 K/CU MM   Comprehensive Metabolic Panel    Collection Time: 11/12/22  2:22 PM   Result Value Ref Range    Sodium 137 135 - 145 MMOL/L    Potassium 4.3 3.5 - 5.1 MMOL/L    Chloride 99 99 - 110 mMol/L    CO2 25 21 - 32 MMOL/L    BUN 33 (H) 6 - 23 MG/DL    Creatinine 1.5 (H) 0.6 - 1.1 MG/DL    Est, Glom Filt Rate 32 (L) >60 mL/min/1.73m2    Glucose 101 (H) 70 - 99 MG/DL    Calcium 9.3 8.3 - 10.6 MG/DL    Albumin 3.7 3.4 - 5.0 GM/DL    Total Protein 7.1 6.4 - 8.2 GM/DL    Total Bilirubin 0.5 0.0 - 1.0 MG/DL    ALT 11 10 - 40 U/L    AST 21 15 - 37 IU/L    Alkaline Phosphatase 56 40 - 129 IU/L    Anion Gap 13 4 - 16   Lipase    Collection Time: 11/12/22  2:22 PM   Result Value Ref Range    Lipase 24 13 - 60 IU/L   Troponin    Collection Time: 11/12/22  2:22 PM   Result Value Ref Range    Troponin T 0.012 (H) <0.01 NG/ML   COVID-19, Rapid    Collection Time: 11/12/22  2:43 PM    Specimen: Nasopharyngeal   Result Value Ref Range    Source NASOPHARYNGEAL SWAB     SARS-CoV-2, NAAT NOT DETECTED NOT DETECTED   Rapid Flu Swab    Collection Time: 11/12/22  2:43 PM    Specimen: Nasopharyngeal   Result Value Ref Range    Rapid Influenza A Ag NEGATIVE NEGATIVE    Rapid Influenza B Ag NEGATIVE NEGATIVE   EKG 12 Lead    Collection Time: 11/12/22  2:43 PM   Result Value Ref Range    Ventricular Rate 78 BPM    Atrial Rate 78 BPM    P-R Interval 146 ms    QRS Duration 80 ms    Q-T Interval 414 ms    QTc Calculation (Bazett) 471 ms    P Axis 41 degrees    R Axis 7 degrees    T Axis 35 degrees    Diagnosis       Normal sinus rhythm with sinus arrhythmia  Possible Left atrial enlargement  Possible Anterior infarct , age undetermined  Abnormal ECG  No previous ECGs available     Urinalysis    Collection Time: 11/12/22  3:22 PM   Result Value Ref Range    Color, UA YELLOW YELLOW    Clarity, UA CLEAR CLEAR    Glucose, Urine NEGATIVE NEGATIVE MG/DL    Bilirubin Urine NEGATIVE NEGATIVE MG/DL    Ketones, Urine NEGATIVE NEGATIVE MG/DL    Specific Gravity, UA <1.005 1.001 - 1.035    Blood, Urine TRACE (A) NEGATIVE    pH, Urine 6.5 5.0 - 8.0    Protein, UA NEGATIVE NEGATIVE MG/DL    Urobilinogen, Urine 0.2 0.2 - 1.0 MG/DL    Nitrite Urine, Quantitative NEGATIVE NEGATIVE    Leukocyte Esterase, Urine NEGATIVE NEGATIVE   Microscopic Urinalysis    Collection Time: 11/12/22  3:22 PM   Result Value Ref Range    RBC, UA 1 0 - 6 /HPF    WBC, UA 1 0 - 5 /HPF    Epithelial Cells, UA 1 /HPF    Cast Type NO CAST FORMS SEEN NO CAST FORMS SEEN /HPF    Bacteria, UA RARE (A) NEGATIVE /HPF    Crystal Type NEGATIVE NEGATIVE /HPF   Lactate, Sepsis    Collection Time: 11/12/22  3:37 PM   Result Value Ref Range    Lactic Acid, Sepsis 1.1 0.5 - 1.9 mMOL/L   Procalcitonin    Collection Time: 11/12/22 10:19 PM   Result Value Ref Range    Procalcitonin 5.42    Comprehensive Metabolic Panel w/ Reflex to MG    Collection Time: 11/13/22  3:36 AM   Result Value Ref Range    Sodium 137 135 - 145 MMOL/L    Potassium 4.4 3.5 - 5.1 MMOL/L    Chloride 101 99 - 110 mMol/L    CO2 25 21 - 32 MMOL/L    BUN 34 (H) 6 - 23 MG/DL    Creatinine 1.7 (H) 0.6 - 1.1 MG/DL    Est, Glom Filt Rate 28 (L) >60 mL/min/1.73m2    Glucose 77 70 - 99 MG/DL    Calcium 8.5 8.3 - 10.6 MG/DL    Albumin 3.2 (L) 3.4 - 5.0 GM/DL    Total Protein 5.4 (L) 6.4 - 8.2 GM/DL    Total Bilirubin 0.6 0.0 - 1.0 MG/DL    ALT 10 10 - 40 U/L    AST 18 15 - 37 IU/L    Alkaline Phosphatase 52 40 - 128 IU/L    Anion Gap 11 4 - 16   CBC with Auto Differential    Collection Time: 11/13/22  3:36 AM   Result Value Ref Range    WBC 17.0 (H) 4.0 - 10.5 K/CU MM    RBC 3.61 (L) 4.2 - 5.4 M/CU MM    Hemoglobin 11.0 (L) 12.5 - 16.0 GM/DL    Hematocrit 35.0 (L) 37 - 47 %    MCV 97.0 78 - 100 FL    MCH 30.5 27 - 31 PG    MCHC 31.4 (L) 32.0 - 36.0 %    RDW 13.9 11.7 - 14.9 %    Platelets 011 972 - 237 K/CU MM    MPV 10.6 7.5 - 11.1 FL    Differential Type AUTOMATED DIFFERENTIAL     Segs Relative 79.5 (H) 36 - 66 %    Lymphocytes % 12.9 (L) 24 - 44 %    Monocytes % 6.4 (H) 0 - 4 %    Eosinophils % 0.5 0 - 3 %    Basophils % 0.4 0 - 1 %    Segs Absolute 13.5 K/CU MM    Lymphocytes Absolute 2.2 K/CU MM    Monocytes Absolute 1.1 K/CU MM    Eosinophils Absolute 0.1 K/CU MM    Basophils Absolute 0.1 K/CU MM    Nucleated RBC % 0.0 %    Total Nucleated RBC 0.0 K/CU MM    Total Immature Neutrophil 0.05 K/CU MM    Immature Neutrophil % 0.3 0 - 0.43 %   Troponin    Collection Time: 11/13/22  3:36 AM   Result Value Ref Range    Troponin T <0.010 <0.01 NG/ML        Imaging/Diagnostics Last 24 Hours   XR CHEST PORTABLE    Result Date: 11/12/2022  EXAMINATION: ONE XRAY VIEW OF THE CHEST 11/12/2022 2:48 pm COMPARISON: None. HISTORY: ORDERING SYSTEM PROVIDED HISTORY: weak for 1 day TECHNOLOGIST PROVIDED HISTORY: Reason for exam:->weak for 1 day Reason for Exam: weak for 1 day FINDINGS: Status post median sternotomy. Heart size within normal limits. Right parahilar airspace opacity. No pneumothorax.   Retrocardiac density suggesting a hiatal hernia.      Right parahilar airspace opacity could represent pneumonia Hiatal hernia       Electronically signed by Adalberto Childs MD on 11/13/2022 at 11:31 AM

## 2022-11-13 NOTE — PROGRESS NOTES
Physical Therapy  Facility/Department: [address] [place] [address]  Physical Therapy Initial Assessment    Name: Liss Roger  : 1928  MRN: [id number]  Date of Service: 2022    Discharge Recommendations:  Subacute/Skilled Nursing Facility              Assessment   Assessment: Pt is a 80 y.o. female with medical history, surgical history, co-morbidities, and personal factors including Acid reflux, Arthritis, CAD (coronary artery disease), Cancer ([facility] [address] CHF (congestive heart failure) ([facility] [address] DDD (degenerative disc disease), cervical, Dementia ([facility] [address] Full dentures, Gallstone, [place] (hard of hearing), Hx of blood clots, Hyperlipidemia, Hypertension, PONV (postoperative nausea and vomiting), UTI (urinary tract infection), Kidney surgery; Coronary artery bypass graft (10+ yrs ago); bladder suspension; other surgical history; Cardiac catheterization; and other surgical history (Right, 2017) with admission for Pneumonia due to infectious organism. Prior to admission, pt was modified independent with functional mobility and ADLs. Examination of body systems reveals decreased strength, decreased balance, decreased aerobic capacity, impaired cognition, and decreased independence with functional mobility.       Therapy Prognosis: Good  Decision Making: High Complexity  Clinical Presentation: unpredictable characteristics  Activity Tolerance  Activity Tolerance: Patient tolerated evaluation without incident     Plan   Physcial Therapy Plan  General Plan: 3-5 times per week  Current Treatment Recommendations: Strengthening, ROM, Balance training, Functional mobility training, Transfer training, IADL training, Cognitive/Perceptual training, Endurance training, Safety education & training, Patient/Caregiver education & training, Therapeutic activities, Gait training, Stair training, Equipment evaluation, education, & procurement, Positioning, ADL/Self-care training, Neuromuscular re-education, Pain management, Home exercise program, Cognitive reorientation  Safety Devices  Type of Devices: All fall risk precautions in place, Patient at risk for falls, Call light within reach, Left in chair, Chair alarm in place, Gait belt, Nurse notified     Restrictions  Restrictions/Precautions  Restrictions/Precautions: General Precautions, Fall Risk     Subjective   General  Chart Reviewed: Yes  Patient assessed for rehabilitation services?: Yes  Family / Caregiver Present: No  Follows Commands: Impaired  Subjective  Subjective: pain: denies; 0/10         Social/Functional History  Social/Functional History  Lives With: Son  Type of Home: House  Home Access: Stairs to enter without rails  Entrance Stairs - Number of Steps: 4  Home Equipment: Alison Mowers, rolling  ADL Assistance: Independent  Homemaking Assistance: Independent  Ambulation Assistance: Independent  Transfer Assistance: Independent  Active : No  Additional Comments: Questionable historian, should confirm with family    Vision/Hearing  Vision  Vision: Within Functional Limits  Hearing  Hearing: Exceptions to [facility]  Hearing Exceptions: Hard of hearing/hearing concerns      Cognition   Orientation  Overall Orientation Status: Impaired  Orientation Level: Disoriented to time;Oriented to person;Disoriented to place; Disoriented to situation  Cognition  Overall Cognitive Status: Exceptions  Arousal/Alertness: Appropriate responses to stimuli  Following Commands: Follows one step commands with increased time; Follows one step commands with repetition  Attention Span: Difficulty attending to directions; Attends with cues to redirect  Memory: Decreased short term memory;Decreased long term memory  Safety Judgement: Decreased awareness of need for safety;Decreased awareness of need for assistance  Problem Solving: Decreased awareness of errors  Insights: Decreased awareness of deficits  Initiation: Requires cues for some  Sequencing: Requires cues for some  Cognition Comment: Fiorella per chart Objective           Gross Assessment  Sensation: Intact (BLEs)        Strength RLE  Comment: knee extension: 4-/5, ankle DF: 4/5  Strength LLE  Comment: knee extension: 4-/5, ankle DF: 4/5           Bed mobility  Supine to Sit: Minimal assistance (with verbal and tactile cues for BUE and BLE placement throughout transfer; with HOB elevated)  Transfers  Sit to Stand: Minimal Assistance (with verbal cues to push through bed and avoid pulling on walker)  Stand to Sit: Minimal Assistance (with verbal cues to feel chair against back of legs, reach back, and sit slowly)  Ambulation  Surface: Level tile  Device: Rolling Walker  Assistance: Contact guard assistance;Minimal assistance  Quality of Gait: decreased gait speed, decreased step length bilaterally, unsteady gait  Distance: 40 feet + 40 feet  Comments: with verbal and tactile cues for BLE placement, walker placement, and sequence throughout ambulation; with verbal and tactile cues to maintain upright posture in order to avoid COM shifting outside of JOVAN; with verbal cues for directions in order to successfully navigate hallway and return to correct room     Balance  Posture: Fair  Sitting - Static: Good  Sitting - Dynamic: Good  Standing - Static: Fair;-  Standing - Dynamic: Poor;+           Gait Training:  Cues were given for safety, sequence, device management, balance, posture, awareness, path. AM-PAC Score  AM-PAC Inpatient Mobility Raw Score : 16 (11/13/22 1829)  AM-PAC Inpatient T-Scale Score : 40.78 (11/13/22 1829)  Mobility Inpatient CMS 0-100% Score: 54.16 (11/13/22 1829)  Mobility Inpatient CMS G-Code Modifier : CK (11/13/22 1829)            Goals  Long Term Goals  Time Frame for Long Term Goals :  In one week:  Long Term Goal 1: Pt will complete all bed mobility with SBAx1  Long Term Goal 2: Pt will complete sit <> stand transfers with SBAx1  Long Term Goal 3: Pt will ambulate 200 feet with SBAx1 with LRAD  Long Term Goal 4: Pt will independently complete 3 sets of 10 reps of BLE AROM exercises in available and allowed ROM       Education  Patient Education  Education Given To: Patient  Education Provided: Role of Therapy; Energy Conservation; Fall Prevention Strategies; Plan of Care;IADL Safety; ADL Adaptive Strategies; Equipment;Transfer Training  Education Method: Demonstration;Verbal  Barriers to Learning: Cognition; Hearing  Education Outcome: Verbalized understanding;Demonstrated understanding;Continued education needed      Time In: 1514  Time Out: 1540  Total Treatment Time: 26  Timed Code Treatment Minutes: Giovani Dunn 187, PT, DPT  License #: [id number]

## 2022-11-13 NOTE — PROGRESS NOTES
[facility] ACUTE CARE OCCUPATIONAL THERAPY EVALUATION    Nupur Bruno, 5/14/1928, 2008/2008-A, 11/13/2022    Discharge Recommendation: Faustino Hinkle      History:  [place]:  The encounter diagnosis was Pneumonia due to infectious organism, unspecified laterality, unspecified part of lung. Past Medical History:   Diagnosis Date    Acid reflux     Arthritis     CAD (coronary artery disease)     does not follow with cardiologist at this time    Cancer [facility])     skin cancer on her arm    CHF (congestive heart failure) ([facility] [address]     DDD (degenerative disc disease), cervical     \"uses walker\"    Dementia ([facility] [address]     Full dentures     Gallstone     \"they will not due surgery on this\"    [place] (hard of hearing)     \"right ear- no hearing aide\"    Hx of blood clots     \"had blood clot both legs few yrs ago\"\"use to be on Coumadin for this- off this now    Hyperlipidemia     Hypertension     PONV (postoperative nausea and vomiting)     UTI (urinary tract infection)     following with dr Teresa Noriega UTI- hospitalized for this twice- getting picc line to get antibiotics\" per son 9/12/2017    Wears glasses        Subjective:  Patient states: \"Are you taking my bed? Is it where you're sleeping tonight? \"  Pain:  denies  Communication with other providers: co-eval w/ PT, handoff to RN  Restrictions: General Precautions, Fall Risk    Home Setup/Prior level of function:  Social/Functional History  Lives With: Son  Type of Home: House  Home Access: Stairs to enter without rails  Entrance Stairs - Number of Steps: 4  Home Equipment: Walker, rolling  ADL Assistance: Independent  Homemaking Assistance: Independent  Ambulation Assistance: Independent  Transfer Assistance: Independent  Active : No  Additional Comments: Questionable historian, should confirm with family     Examination:  Observation: Supine in bed upon arrival, agreeable to therapy eval.  Vision: WFL  Hearing: WFL  Vitals: Stable vitals throughout session on room air      Body Systems and functions:  ROM: WFL   Strength: B UE grossly 4-/5 across all major joints   Sensation: WFL  Tone: Normal  Coordination: WFL  Perception: WNL      Cognitive and Psychosocial Functioning:  Overall cognitive status: alert, oriented to person  Affect: Normal   Arousal/Alertness Appropriate responses to stimuli   Following Commands Follows one step commands with increased time; Follows one step commands with repetition   Attention Span Difficulty attending to directions; Attends with cues to redirect   Memory Decreased short term memory; Decreased long term memory   Safety Judgement Decreased awareness of need for safety; Decreased awareness of need for assistance   Problem Solving Decreased awareness of errors   Insights Decreased awareness of deficits   Initiation Requires cues for some   Sequencing Requires cues for some   Cognition Comment [employer] per chart       Functional Mobility:  Bed mobility:  supine to sitting EOB w/ CGA, Vcs for initiation and sequencing  Sitting balance:  SBA     Transfers: STS from EOB w/ CGA, stand to sit to recliner w/ CGA  Standing balance:  CGA static and dynamic w/ RW  Functional Mobility: ambulated functional household distance using RW w/ CGA, Vcs for sequencing/safety w/ RW use and pathway negotiation  Toilet/Shower Transfers: NT        Activities of Daily Living (ADLs):  Feeding: set up A  Grooming: min A  UB bathing: mod A  LB bathing: mod A  UB dressing: mod A  LB dressing: mod A  Toileting: mod A hygiene and clothing mgmt     *ADL determined per observation of functional mobility, balance, activity tolerance, cognition, or actual ADL performance. AM-PAC 6 click short form for inpatient daily activity:   How much help from another person does the patient currently need. .. Unable  Dep A Lot  Max A A Lot   Mod A A Little  Min A A Little   CGA  SBA None   Mod I  Indep  Sup   1.   Putting on and taking off regular lower body clothing? [] 1    [] 2   [x] 2   [] 3   [] 3   [] 4      2. Bathing (including washing, rinsing, drying)? [] 1   [] 2   [x] 2 [] 3 [] 3 [] 4   3. Toileting, which includes using toilet, bedpan, or urinal? [] 1    [] 2   [x] 2   [] 3   [] 3   [] 4     4. Putting on and taking off regular upper body clothing? [] 1   [] 2   [x] 2   [] 3   [] 3    [] 4      5. Taking care of personal grooming such as brushing teeth? [] 1   [] 2    [] 2 [x] 3    [] 3   [] 4      6. Eating meals? [] 1   [] 2   [] 2   [] 3   [] 3   [x] 4        Raw Score:  15     [24=0% impaired(CH), 23=1-19%(CI), 20-22=20-39%(CJ), 15-19=40-59%(CK), 10-14=60-79%(CL), 7-9=80-99%(CM), 6=100%(CN)]     Treatment:    Therapeutic Activity Training:   Therapeutic activity training was instructed today. Cues were given for safety, sequence, UE/LE placement, awareness, and balance. Activities performed today included bed mobility training, sup-sit, sit-stand, ambulation. Educated pt on role of OT, therapy POC and functional goals, progression w/ ADLs and transfers, importance of movement and OOB activity, d/c recommendations     Safety Measures: Gait belt used, Left in recliner, Alarm in place  Recommendations for NURSING activity:  Up to chair for all 3 meals and up to bathroom for all toileting needs     Assessment:  Pt is a 80 y o F admitted d/t PNA symptoms. Pt at baseline is IND for ADLs, IND for high level IADLs, and mod I for functional transfers/mobility w/ RW. Pt currently presents w/ deficits in ADL and high level IADL independence, functional ADL transfers, strength, and functional activity tolerance. Continued OT services recommended to increase safety and independence with ADL routine and to address remaining functional deficits. Pt would benefit from continued acute care OT services w/ discharge to SNF. Complexity: Moderate  Prognosis: Good, no significant barriers to participation at this time.    Occupational Therapy Plan  Times Per Week: 3+  Current Treatment Recommendations: Strengthening, ROM, Endurance training, Functional mobility training, Pain management, Cognitive reorientation, Patient/Caregiver education & training, Safety education & training, Positioning, Self-Care / ADL, Equipment evaluation, education, & procurement, Home management training, Cognitive/Perceptual training         Goals:  Pt will complete all aspects of bed mobility for EOB/OOB ADLs w/ supervision. Pt will complete UB ADLs w/ supervision. Pt will complete LB ADLs w/ supervision. Pt will complete all functional transfers to and from bed, chair, toilet, shower chair w/ supervision. Pt will ambulate functional household distance w/ supervision. Pt will complete all aspects of toileting task w/ supervision. Pt will perform therex/theract in order to increase strength and functional activity tolerance necessary for increased independence w/ ADL routine.     Pt goal: go home, get stronger  Time Frame for STGs: discharge    Equipment: Continue to assess at next LOC    Time:   Time in: 3840  Time out: 1540  Total time: 26  Timed treatment minutes: 16        Electronically signed by:      JM Ghosh/AGUS  [id number]

## 2022-11-14 PROBLEM — E43 SEVERE MALNUTRITION (HCC): Status: ACTIVE | Noted: 2022-11-14

## 2022-11-14 LAB
ANION GAP SERPL CALCULATED.3IONS-SCNC: 6 MMOL/L (ref 4–16)
BASOPHILS ABSOLUTE: 0.1 K/CU MM
BASOPHILS RELATIVE PERCENT: 0.4 % (ref 0–1)
BUN BLDV-MCNC: 35 MG/DL (ref 6–23)
CALCIUM SERPL-MCNC: 8.8 MG/DL (ref 8.3–10.6)
CHLORIDE BLD-SCNC: 98 MMOL/L (ref 99–110)
CO2: 26 MMOL/L (ref 21–32)
CREAT SERPL-MCNC: 1.4 MG/DL (ref 0.6–1.1)
DIFFERENTIAL TYPE: ABNORMAL
EOSINOPHILS ABSOLUTE: 0.1 K/CU MM
EOSINOPHILS RELATIVE PERCENT: 0.6 % (ref 0–3)
GFR SERPL CREATININE-BSD FRML MDRD: 35 ML/MIN/1.73M2
GLUCOSE BLD-MCNC: 78 MG/DL (ref 70–99)
HCT VFR BLD CALC: 35.9 % (ref 37–47)
HEMOGLOBIN: 11.3 GM/DL (ref 12.5–16)
IMMATURE NEUTROPHIL %: 0.4 % (ref 0–0.43)
LYMPHOCYTES ABSOLUTE: 1.3 K/CU MM
LYMPHOCYTES RELATIVE PERCENT: 11.9 % (ref 24–44)
MCH RBC QN AUTO: 30.1 PG (ref 27–31)
MCHC RBC AUTO-ENTMCNC: 31.5 % (ref 32–36)
MCV RBC AUTO: 95.5 FL (ref 78–100)
MONOCYTES ABSOLUTE: 0.9 K/CU MM
MONOCYTES RELATIVE PERCENT: 8.1 % (ref 0–4)
NUCLEATED RBC %: 0 %
PDW BLD-RTO: 13.7 % (ref 11.7–14.9)
PLATELET # BLD: 175 K/CU MM (ref 140–440)
PMV BLD AUTO: 10.8 FL (ref 7.5–11.1)
POTASSIUM SERPL-SCNC: 4.4 MMOL/L (ref 3.5–5.1)
RBC # BLD: 3.76 M/CU MM (ref 4.2–5.4)
SEGMENTED NEUTROPHILS ABSOLUTE COUNT: 8.9 K/CU MM
SEGMENTED NEUTROPHILS RELATIVE PERCENT: 78.6 % (ref 36–66)
SODIUM BLD-SCNC: 130 MMOL/L (ref 135–145)
TOTAL IMMATURE NEUTOROPHIL: 0.04 K/CU MM
TOTAL NUCLEATED RBC: 0 K/CU MM
WBC # BLD: 11.3 K/CU MM (ref 4–10.5)

## 2022-11-14 PROCEDURE — 85025 COMPLETE CBC W/AUTO DIFF WBC: CPT

## 2022-11-14 PROCEDURE — 1200000000 HC SEMI PRIVATE

## 2022-11-14 PROCEDURE — 2580000003 HC RX 258: Performed by: STUDENT IN AN ORGANIZED HEALTH CARE EDUCATION/TRAINING PROGRAM

## 2022-11-14 PROCEDURE — 76937 US GUIDE VASCULAR ACCESS: CPT

## 2022-11-14 PROCEDURE — 94761 N-INVAS EAR/PLS OXIMETRY MLT: CPT

## 2022-11-14 PROCEDURE — 6360000002 HC RX W HCPCS: Performed by: STUDENT IN AN ORGANIZED HEALTH CARE EDUCATION/TRAINING PROGRAM

## 2022-11-14 PROCEDURE — 6370000000 HC RX 637 (ALT 250 FOR IP): Performed by: STUDENT IN AN ORGANIZED HEALTH CARE EDUCATION/TRAINING PROGRAM

## 2022-11-14 PROCEDURE — 36415 COLL VENOUS BLD VENIPUNCTURE: CPT

## 2022-11-14 PROCEDURE — 80048 BASIC METABOLIC PNL TOTAL CA: CPT

## 2022-11-14 RX ADMIN — HEPARIN SODIUM 5000 UNITS: 5000 INJECTION INTRAVENOUS; SUBCUTANEOUS at 08:51

## 2022-11-14 RX ADMIN — CEFTRIAXONE SODIUM 1000 MG: 1 INJECTION, POWDER, FOR SOLUTION INTRAMUSCULAR; INTRAVENOUS at 09:10

## 2022-11-14 RX ADMIN — METOPROLOL TARTRATE 12.5 MG: 25 TABLET, FILM COATED ORAL at 20:08

## 2022-11-14 RX ADMIN — PANTOPRAZOLE SODIUM 40 MG: 40 TABLET, DELAYED RELEASE ORAL at 08:54

## 2022-11-14 RX ADMIN — FERROUS SULFATE TAB 325 MG (65 MG ELEMENTAL FE) 325 MG: 325 (65 FE) TAB at 08:50

## 2022-11-14 RX ADMIN — HEPARIN SODIUM 5000 UNITS: 5000 INJECTION INTRAVENOUS; SUBCUTANEOUS at 20:08

## 2022-11-14 RX ADMIN — ACETAMINOPHEN 650 MG: 325 TABLET ORAL at 22:38

## 2022-11-14 RX ADMIN — SODIUM CHLORIDE, PRESERVATIVE FREE 10 ML: 5 INJECTION INTRAVENOUS at 08:51

## 2022-11-14 RX ADMIN — CYANOCOBALAMIN TAB 1000 MCG 1000 MCG: 1000 TAB at 08:54

## 2022-11-14 RX ADMIN — SODIUM CHLORIDE, PRESERVATIVE FREE 10 ML: 5 INJECTION INTRAVENOUS at 20:19

## 2022-11-14 RX ADMIN — METOPROLOL TARTRATE 12.5 MG: 25 TABLET, FILM COATED ORAL at 08:51

## 2022-11-14 RX ADMIN — LORAZEPAM 1 MG: 1 TABLET ORAL at 20:09

## 2022-11-14 RX ADMIN — DEXTROSE MONOHYDRATE 500 MG: 50 INJECTION, SOLUTION INTRAVENOUS at 10:01

## 2022-11-14 RX ADMIN — ATORVASTATIN CALCIUM 20 MG: 10 TABLET, FILM COATED ORAL at 08:50

## 2022-11-14 NOTE — CONSULTS
Consult completed. Nexiva 22g 1.75 inch catheter inserted via ultrasound in patient's RFA. Brisk blood return noted and catheter flushes with ease. Patient tolerated well. Consult IV/PICC team for any questions or if patient's needs change.

## 2022-11-14 NOTE — PROGRESS NOTES
V2.0  [facility] Progress Note      Name:  Moreno Ball /Age/Sex: 1928  (80 y.o. female)   MRN & CSN:  [id number] & [id number] Encounter Date/Time: 2022 11:31 AM EST    Location:  -A PCP: [address] [place] [address] Day: 3    Assessment and Plan:   Moreno Ball is a 80 y.o. female with pmh of hypertension, hyperlipidemia, GERD and arthritis who presents with Pneumonia symptoms      # Sepsis secondary to community-acquired pneumonia:  - Respiratory rate 22, WBC 22.3--->17.0-->11, with chest x-ray suggestive of right-sided pneumonia, Pro-Evgeny 5.42, lactic acid 1.1  - continue Rocephin and azithromycin  - Follow-up blood culture no growth at 24 hours, urine strep/Legionella antigen-pending not collected at, mycoplasma in process and rapid influenza negative  - On room air saturating well  - PT/OT evaluated and recommended SNF but patient refusing SNF.   Discussed with patient's son at bedside he also agreed stating that patient does not want SNF and he is willing to take her home with home health care explained the risks, said understood.  [facility] and for possible [facility] tomorrow     # Generalized weakness:  - Likely secondary to pneumonia, PT OT evaluated and recommended SNF     # Chronic kidney disease stage IIIa  - Creatinine trending down 1.7---> 1.4 and UA trace blood only no sign of infection and no proteinuria  -Discussed with patient's son states patient had kidney problem at baseline and was admitted at a different hospital couple of weeks ago with UTI  -As patient is DNR CCA will not do any work-up or consult but will continue to monitor     # Hypertension: Continue home dose of metoprolol, hold Lasix     # Severe osteoarthritis: On Norco at home continue as needed Norco.     # Elevated troponin: Trended down  - Likely demand ischemia secondary to pneumonia, no chest pain, EKG's showed no ST-T wave changes, and normal sinus rhythm       Diet ADULT DIET; Regular; 3 carb choices (45 gm/meal); Low Fat/Low Chol/High Fiber/CARIE; Low Sodium (2 gm)   DVT Prophylaxis [] Lovenox, [x]  Heparin, [] SCDs, [] Ambulation,  [] Eliquis, [] Xarelto  [] Coumadin   Code Status DNR-CCA   Disposition From: Home  Expected Disposition: Home with home health care  Estimated Date of Discharge: 11/15  Patient requires continued admission due to sepsis and generalized weakness   Surrogate Decision Maker/ POA Son     Subjective:     Chief Complaint: Emesis, Fatigue, and Cough       Patient seen and examined at bedside. Patient sitting on the chair at bedside in no distress states she has no complaints but states she feeling weak and tired         Review of Systems:    Review of Systems    Constitutional: No fever no chills but weakness  HEENT: No headache no dizziness  Cardiovascular: No chest pain no palpitations  Respiratory: No cough no shortness of breath  Abdomen: No diarrhea, no nausea, no vomiting, no abdominal pain  Musculoskeletal: No swelling  Neuro: No numbness or tingling  Skin: No rash      Objective: Intake/Output Summary (Last 24 hours) at 11/14/2022 1155  Last data filed at 11/13/2022 1714  Gross per 24 hour   Intake 240 ml   Output --   Net 240 ml          Vitals:   Vitals:    11/14/22 0733   BP:    Pulse:    Resp:    Temp:    SpO2: 92%       Physical Exam:     General: Afebrile, no distress  Eyes: EOMI  ENT: neck supple  Cardiovascular: S1-S2 normal  Respiratory: Air entry good bilaterally no wheezing no crackles  Gastrointestinal: Soft, non tender bowel sounds normal  Genitourinary: no suprapubic tenderness  Musculoskeletal: No edema  Skin: warm, dry  Neuro: Alert. Oriented no focal deficit  Psych: Mood appropriate.      Medications:   Medications:    cefepime  2,000 mg IntraVENous Once    sodium chloride flush  5-40 mL IntraVENous 2 times per day    heparin (porcine)  5,000 Units SubCUTAneous BID    cefTRIAXone (ROCEPHIN) IV  1,000 mg IntraVENous Q24H azithromycin  500 mg IntraVENous Q24H    cyanocobalamin  1,000 mcg Oral Daily    ferrous sulfate  325 mg Oral Daily with breakfast    metoprolol tartrate  12.5 mg Oral BID    pantoprazole  40 mg Oral QAM AC    atorvastatin  20 mg Oral Daily      Infusions:    sodium chloride       PRN Meds: sodium chloride flush, 5-40 mL, PRN  sodium chloride, , PRN  ondansetron, 4 mg, Q8H PRN   Or  ondansetron, 4 mg, Q6H PRN  polyethylene glycol, 17 g, Daily PRN  acetaminophen, 650 mg, Q6H PRN   Or  acetaminophen, 650 mg, Q6H PRN  LORazepam, 1 mg, Q12H PRN  HYDROcodone-acetaminophen, 1 tablet, Q6H PRN      Labs      Recent Results (from the past 24 hour(s))   Basic Metabolic Panel w/ Reflex to MG    Collection Time: 11/14/22  6:37 AM   Result Value Ref Range    Sodium 130 (L) 135 - 145 MMOL/L    Potassium 4.4 3.5 - 5.1 MMOL/L    Chloride 98 (L) 99 - 110 mMol/L    CO2 26 21 - 32 MMOL/L    Anion Gap 6 4 - 16    BUN 35 (H) 6 - 23 MG/DL    Creatinine 1.4 (H) 0.6 - 1.1 MG/DL    Est, Glom Filt Rate 35 (L) >60 mL/min/1.73m2    Glucose 78 70 - 99 MG/DL    Calcium 8.8 8.3 - 10.6 MG/DL   CBC with Auto Differential    Collection Time: 11/14/22  6:37 AM   Result Value Ref Range    WBC 11.3 (H) 4.0 - 10.5 K/CU MM    RBC 3.76 (L) 4.2 - 5.4 M/CU MM    Hemoglobin 11.3 (L) 12.5 - 16.0 GM/DL    Hematocrit 35.9 (L) 37 - 47 %    MCV 95.5 78 - 100 FL    MCH 30.1 27 - 31 PG    MCHC 31.5 (L) 32.0 - 36.0 %    RDW 13.7 11.7 - 14.9 %    Platelets 722 614 - 789 K/CU MM    MPV 10.8 7.5 - 11.1 FL    Differential Type AUTOMATED DIFFERENTIAL     Segs Relative 78.6 (H) 36 - 66 %    Lymphocytes % 11.9 (L) 24 - 44 %    Monocytes % 8.1 (H) 0 - 4 %    Eosinophils % 0.6 0 - 3 %    Basophils % 0.4 0 - 1 %    Segs Absolute 8.9 K/CU MM    Lymphocytes Absolute 1.3 K/CU MM    Monocytes Absolute 0.9 K/CU MM    Eosinophils Absolute 0.1 K/CU MM    Basophils Absolute 0.1 K/CU MM    Nucleated RBC % 0.0 %    Total Nucleated RBC 0.0 K/CU MM    Total Immature Neutrophil 0.04 K/CU MM    Immature Neutrophil % 0.4 0 - 0.43 %        Imaging/Diagnostics Last 24 Hours   XR CHEST PORTABLE    Result Date: 11/12/2022  EXAMINATION: ONE XRAY VIEW OF THE CHEST 11/12/2022 2:48 pm COMPARISON: None. HISTORY: ORDERING SYSTEM PROVIDED HISTORY: weak for 1 day TECHNOLOGIST PROVIDED HISTORY: Reason for exam:->weak for 1 day Reason for Exam: weak for 1 day FINDINGS: Status post median sternotomy. Heart size within normal limits. Right parahilar airspace opacity. No pneumothorax. Retrocardiac density suggesting a hiatal hernia.      Right parahilar airspace opacity could represent pneumonia Hiatal hernia       Electronically signed by Claudene Lipoma, MD on 11/14/2022 at 11:55 AM

## 2022-11-14 NOTE — PROGRESS NOTES
Comprehensive Nutrition Assessment    Type and Reason for Visit:  Initial (low BMI)    Nutrition Recommendations/Plan:   Liberalize diet to encourage increased po intake  Add standard oral nutrition supplement TID  Please document all po intake  Will monitor po intake, weight trends, poc     Malnutrition Assessment:  Malnutrition Status:  Severe malnutrition (11/14/22 1401)    Context:  Chronic Illness       Nutrition Assessment:    Pt admitted for pneumonia, PMH: HTN, HLD, CHF, CAD, s/p CABG, pt currently on 3 carb/cardiac/low sodium diet, pt consumed % x 3 meals documented yesterday, underweight, visited pt at bedside, pt states that she doesn't eat very much at home and doesn't have much of an appetite, NFPE completed, pt agreeable to receive standard oral nutrition supplements, pt meets criteria for malnturition, will follow at high nutrition risk    Nutrition Related Findings:    Na 130 Wound Type: None       Current Nutrition Intake & Therapies:    Average Meal Intake: %  Average Supplements Intake: None Ordered  ADULT ORAL NUTRITION SUPPLEMENT; Breakfast, Lunch, Dinner; Standard High Calorie/High Protein Oral Supplement  ADULT DIET; Regular    Anthropometric Measures:  Height: 5' 2.99\" (160 cm)  Ideal Body Weight (IBW): 115 lbs (52 kg)       Current Body Weight: 89 lb 15.2 oz (40.8 kg), 78.2 % IBW.  Weight Source: Bed Scale  Current BMI (kg/m2): 15.9  Usual Body Weight: 95 lb 0.3 oz (43.1 kg) ((12/15/21) per chart review)  % Weight Change (Calculated): -5.3  Weight Adjustment For: No Adjustment  BMI Categories: Underweight (BMI less than 22) age over 72    Estimated Daily Nutrient Needs:  Energy Requirements Based On: Kcal/kg  Weight Used for Energy Requirements: Current  Energy (kcal/day): 6487-0728 (30-35 kcal/kg)  Weight Used for Protein Requirements: Current  Protein (g/day): 50-60 (1.2-1.5 g/kg)    Nutrition Diagnosis:   Severe malnutrition, In context of chronic illness related to inadequate protein-energy intake as evidenced by severe muscle loss, severe loss of subcutaneous fat    Nutrition Interventions:   Food and/or Nutrient Delivery: Modify Current Diet, Start Oral Nutrition Supplement  Nutrition Education/Counseling: No recommendation at this time  Coordination of Nutrition Care: Continue to monitor while inpatient       Goals:     Goals: by next RD assessment, PO intake 75% or greater       Nutrition Monitoring and Evaluation:   Behavioral-Environmental Outcomes: None Identified  Food/Nutrient Intake Outcomes: Supplement Intake, Food and Nutrient Intake  Physical Signs/Symptoms Outcomes: Biochemical Data, GI Status, Fluid Status or Edema, Hemodynamic Status, Meal Time Behavior, Weight, Nutrition Focused Physical Findings    Discharge Planning:     Too soon to determine     Mamta Davies [address], [address] [place] [address],   Contact: [phone]

## 2022-11-14 NOTE — CARE COORDINATION
Attempted visit; son not present. Per physician- son is adamant that patient return home; agreeable to [facility]. Attempted to contact son Tammi Hawley at [phone]; LVM.  Abhijeet Novak RN

## 2022-11-15 LAB
ADENOVIRUS DETECTION BY PCR: NOT DETECTED
ANION GAP SERPL CALCULATED.3IONS-SCNC: 10 MMOL/L (ref 4–16)
BACTERIA: ABNORMAL /HPF
BASOPHILS ABSOLUTE: 0 K/CU MM
BASOPHILS RELATIVE PERCENT: 0.5 % (ref 0–1)
BILIRUBIN URINE: NEGATIVE MG/DL
BLOOD, URINE: ABNORMAL
BORDETELLA PARAPERTUSSIS BY PCR: NOT DETECTED
BORDETELLA PERTUSSIS PCR: NOT DETECTED
BUN BLDV-MCNC: 29 MG/DL (ref 6–23)
CALCIUM SERPL-MCNC: 8.7 MG/DL (ref 8.3–10.6)
CHLAMYDOPHILA PNEUMONIA PCR: NOT DETECTED
CHLORIDE BLD-SCNC: 100 MMOL/L (ref 99–110)
CLARITY: CLEAR
CO2: 25 MMOL/L (ref 21–32)
COLOR: YELLOW
CORONAVIRUS 229E PCR: NOT DETECTED
CORONAVIRUS HKU1 PCR: NOT DETECTED
CORONAVIRUS NL63 PCR: NOT DETECTED
CORONAVIRUS OC43 PCR: NOT DETECTED
CREAT SERPL-MCNC: 1.1 MG/DL (ref 0.6–1.1)
DIFFERENTIAL TYPE: ABNORMAL
EOSINOPHILS ABSOLUTE: 0.2 K/CU MM
EOSINOPHILS RELATIVE PERCENT: 2 % (ref 0–3)
GFR SERPL CREATININE-BSD FRML MDRD: 47 ML/MIN/1.73M2
GLUCOSE BLD-MCNC: 104 MG/DL (ref 70–99)
GLUCOSE, URINE: NEGATIVE MG/DL
HCT VFR BLD CALC: 32.8 % (ref 37–47)
HEMOGLOBIN: 10.6 GM/DL (ref 12.5–16)
HUMAN METAPNEUMOVIRUS PCR: NOT DETECTED
IMMATURE NEUTROPHIL %: 0.3 % (ref 0–0.43)
INFLUENZA A BY PCR: NOT DETECTED
INFLUENZA A H1 (2009) PCR: NOT DETECTED
INFLUENZA A H1 PANDEMIC PCR: NOT DETECTED
INFLUENZA A H3 PCR: NOT DETECTED
INFLUENZA B BY PCR: NOT DETECTED
KETONES, URINE: NEGATIVE MG/DL
LEUKOCYTE ESTERASE, URINE: ABNORMAL
LYMPHOCYTES ABSOLUTE: 1.6 K/CU MM
LYMPHOCYTES RELATIVE PERCENT: 20.7 % (ref 24–44)
MCH RBC QN AUTO: 30.3 PG (ref 27–31)
MCHC RBC AUTO-ENTMCNC: 32.3 % (ref 32–36)
MCV RBC AUTO: 93.7 FL (ref 78–100)
MONOCYTES ABSOLUTE: 0.8 K/CU MM
MONOCYTES RELATIVE PERCENT: 10.4 % (ref 0–4)
MYCOPLASMA PNEUMONIAE PCR: NOT DETECTED
NITRITE URINE, QUANTITATIVE: NEGATIVE
NUCLEATED RBC %: 0 %
PARAINFLUENZA 1 PCR: NOT DETECTED
PARAINFLUENZA 2 PCR: NOT DETECTED
PARAINFLUENZA 3 PCR: NOT DETECTED
PARAINFLUENZA 4 PCR: NOT DETECTED
PDW BLD-RTO: 13.3 % (ref 11.7–14.9)
PH, URINE: 7 (ref 5–8)
PLATELET # BLD: 172 K/CU MM (ref 140–440)
PMV BLD AUTO: 10.5 FL (ref 7.5–11.1)
POTASSIUM SERPL-SCNC: 4.6 MMOL/L (ref 3.5–5.1)
PROTEIN UA: NEGATIVE MG/DL
RBC # BLD: 3.5 M/CU MM (ref 4.2–5.4)
RBC URINE: 2 /HPF (ref 0–6)
RHINOVIRUS ENTEROVIRUS PCR: NOT DETECTED
RSV PCR: NOT DETECTED
SARS-COV-2: NOT DETECTED
SEGMENTED NEUTROPHILS ABSOLUTE COUNT: 5 K/CU MM
SEGMENTED NEUTROPHILS RELATIVE PERCENT: 66.1 % (ref 36–66)
SODIUM BLD-SCNC: 135 MMOL/L (ref 135–145)
SPECIFIC GRAVITY UA: 1.01 (ref 1–1.03)
TOTAL IMMATURE NEUTOROPHIL: 0.02 K/CU MM
TOTAL NUCLEATED RBC: 0 K/CU MM
TRICHOMONAS: ABNORMAL /HPF
UROBILINOGEN, URINE: 0.2 MG/DL (ref 0.2–1)
WBC # BLD: 7.5 K/CU MM (ref 4–10.5)
WBC CLUMP: ABNORMAL /HPF
WBC UA: 10 /HPF (ref 0–5)

## 2022-11-15 PROCEDURE — 36415 COLL VENOUS BLD VENIPUNCTURE: CPT

## 2022-11-15 PROCEDURE — 94761 N-INVAS EAR/PLS OXIMETRY MLT: CPT

## 2022-11-15 PROCEDURE — 2580000003 HC RX 258: Performed by: STUDENT IN AN ORGANIZED HEALTH CARE EDUCATION/TRAINING PROGRAM

## 2022-11-15 PROCEDURE — 6360000002 HC RX W HCPCS: Performed by: STUDENT IN AN ORGANIZED HEALTH CARE EDUCATION/TRAINING PROGRAM

## 2022-11-15 PROCEDURE — 80048 BASIC METABOLIC PNL TOTAL CA: CPT

## 2022-11-15 PROCEDURE — 6370000000 HC RX 637 (ALT 250 FOR IP): Performed by: STUDENT IN AN ORGANIZED HEALTH CARE EDUCATION/TRAINING PROGRAM

## 2022-11-15 PROCEDURE — 1200000000 HC SEMI PRIVATE

## 2022-11-15 PROCEDURE — 6360000002 HC RX W HCPCS: Performed by: FAMILY MEDICINE

## 2022-11-15 PROCEDURE — 81001 URINALYSIS AUTO W/SCOPE: CPT

## 2022-11-15 PROCEDURE — 0202U NFCT DS 22 TRGT SARS-COV-2: CPT

## 2022-11-15 PROCEDURE — 85025 COMPLETE CBC W/AUTO DIFF WBC: CPT

## 2022-11-15 RX ORDER — LORAZEPAM 2 MG/ML
1 INJECTION INTRAMUSCULAR ONCE
Status: COMPLETED | OUTPATIENT
Start: 2022-11-15 | End: 2022-11-15

## 2022-11-15 RX ORDER — POTASSIUM CHLORIDE 20 MEQ/1
20 TABLET, EXTENDED RELEASE ORAL 2 TIMES DAILY
Status: DISCONTINUED | OUTPATIENT
Start: 2022-11-15 | End: 2022-11-16 | Stop reason: HOSPADM

## 2022-11-15 RX ORDER — LANOLIN ALCOHOL/MO/W.PET/CERES
3 CREAM (GRAM) TOPICAL NIGHTLY PRN
Status: DISCONTINUED | OUTPATIENT
Start: 2022-11-15 | End: 2022-11-16 | Stop reason: HOSPADM

## 2022-11-15 RX ORDER — AZITHROMYCIN 250 MG/1
500 TABLET, FILM COATED ORAL DAILY
Status: DISCONTINUED | OUTPATIENT
Start: 2022-11-16 | End: 2022-11-16 | Stop reason: HOSPADM

## 2022-11-15 RX ORDER — FUROSEMIDE 40 MG/1
40 TABLET ORAL DAILY
Status: DISCONTINUED | OUTPATIENT
Start: 2022-11-15 | End: 2022-11-16 | Stop reason: HOSPADM

## 2022-11-15 RX ADMIN — HYDROCODONE BITARTRATE AND ACETAMINOPHEN 1 TABLET: 5; 325 TABLET ORAL at 00:54

## 2022-11-15 RX ADMIN — LORAZEPAM 1 MG: 1 TABLET ORAL at 10:02

## 2022-11-15 RX ADMIN — HEPARIN SODIUM 5000 UNITS: 5000 INJECTION INTRAVENOUS; SUBCUTANEOUS at 10:05

## 2022-11-15 RX ADMIN — POTASSIUM CHLORIDE 20 MEQ: 1500 TABLET, EXTENDED RELEASE ORAL at 10:02

## 2022-11-15 RX ADMIN — METOPROLOL TARTRATE 12.5 MG: 25 TABLET, FILM COATED ORAL at 08:18

## 2022-11-15 RX ADMIN — HYDROCODONE BITARTRATE AND ACETAMINOPHEN 1 TABLET: 5; 325 TABLET ORAL at 17:36

## 2022-11-15 RX ADMIN — LORAZEPAM 1 MG: 2 INJECTION INTRAMUSCULAR; INTRAVENOUS at 03:22

## 2022-11-15 RX ADMIN — HEPARIN SODIUM 5000 UNITS: 5000 INJECTION INTRAVENOUS; SUBCUTANEOUS at 20:24

## 2022-11-15 RX ADMIN — Medication 3 MG: at 20:24

## 2022-11-15 RX ADMIN — LORAZEPAM 1 MG: 1 TABLET ORAL at 20:25

## 2022-11-15 RX ADMIN — DEXTROSE MONOHYDRATE 500 MG: 50 INJECTION, SOLUTION INTRAVENOUS at 12:56

## 2022-11-15 RX ADMIN — SODIUM CHLORIDE, PRESERVATIVE FREE 10 ML: 5 INJECTION INTRAVENOUS at 20:25

## 2022-11-15 RX ADMIN — POTASSIUM CHLORIDE 20 MEQ: 1500 TABLET, EXTENDED RELEASE ORAL at 20:25

## 2022-11-15 RX ADMIN — ATORVASTATIN CALCIUM 20 MG: 10 TABLET, FILM COATED ORAL at 08:19

## 2022-11-15 RX ADMIN — METOPROLOL TARTRATE 12.5 MG: 25 TABLET, FILM COATED ORAL at 20:24

## 2022-11-15 RX ADMIN — SODIUM CHLORIDE, PRESERVATIVE FREE 10 ML: 5 INJECTION INTRAVENOUS at 10:09

## 2022-11-15 RX ADMIN — FERROUS SULFATE TAB 325 MG (65 MG ELEMENTAL FE) 325 MG: 325 (65 FE) TAB at 08:18

## 2022-11-15 RX ADMIN — CEFTRIAXONE SODIUM 1000 MG: 1 INJECTION, POWDER, FOR SOLUTION INTRAMUSCULAR; INTRAVENOUS at 10:12

## 2022-11-15 RX ADMIN — PANTOPRAZOLE SODIUM 40 MG: 40 TABLET, DELAYED RELEASE ORAL at 05:39

## 2022-11-15 RX ADMIN — FUROSEMIDE 40 MG: 40 TABLET ORAL at 10:02

## 2022-11-15 RX ADMIN — CYANOCOBALAMIN TAB 1000 MCG 1000 MCG: 1000 TAB at 08:19

## 2022-11-15 ASSESSMENT — PAIN DESCRIPTION - LOCATION
LOCATION: GENERALIZED
LOCATION: ABDOMEN;HEAD

## 2022-11-15 ASSESSMENT — PAIN SCALES - GENERAL
PAINLEVEL_OUTOF10: 5
PAINLEVEL_OUTOF10: 10
PAINLEVEL_OUTOF10: 0

## 2022-11-15 ASSESSMENT — ENCOUNTER SYMPTOMS
BACK PAIN: 0
SHORTNESS OF BREATH: 0
COUGH: 0
CONSTIPATION: 0
DIARRHEA: 0
SORE THROAT: 0
EYE DISCHARGE: 0
ABDOMINAL DISTENTION: 0
WHEEZING: 0

## 2022-11-15 ASSESSMENT — PAIN - FUNCTIONAL ASSESSMENT: PAIN_FUNCTIONAL_ASSESSMENT: PREVENTS OR INTERFERES SOME ACTIVE ACTIVITIES AND ADLS

## 2022-11-15 ASSESSMENT — PAIN DESCRIPTION - ORIENTATION: ORIENTATION: OTHER (COMMENT)

## 2022-11-15 ASSESSMENT — PAIN DESCRIPTION - DESCRIPTORS: DESCRIPTORS: ACHING;PRESSURE

## 2022-11-15 NOTE — PROGRESS NOTES
V2.0  [facility] Hospitalist Progress Note      Name:  Willi Paz /Age/Sex: 1928  (80 y.o. female)   MRN & CSN:  [id number] & [id number] Encounter Date/Time: 11/15/2022 8:24 AM EST    Location:  -A PCP: [address] [place] [address] Day: 4    Assessment and Plan:   Willi Paz is a 80 y.o. female with pmh of hypertension, hyperlipidemia, GERD, arthritis  who presents with Pneumonia symptoms      Plan:  S# Sepsis secondary to community-acquired pneumonia:  - Respiratory rate 22, WBC 22.3--->17.0-->11, with chest x-ray suggestive of right-sided pneumonia, Pro-Evgeny 5.42, lactic acid 1.1  - continue Rocephin and azithromycin  - Follow-up blood culture NTD, urine strep/Legionella antigen-pending not collected at, mycoplasma in process and rapid influenza negative  - On room air saturating well  - PT/OT evaluated and recommended SNF but patient refusing SNF. Discussed with patient's son at bedside he also agreed stating that patient does not want SNF and he is willing to take her home with home health care explained the risks, said understood. -plan for d/c today however patient has polyurea and has history of multiple UTI in the past, will get UA and d/c on appropriate regimen.      Hospital-acquired delirium  As per overnight nausea patient has not slept at all overnight  This morning patient found to be confused and disoriented  Patient redirectable, will ensure sleep hygiene if she stays overnight     # Generalized weakness:  - Likely secondary to pneumonia, PT OT evaluated and recommended SNF     # Chronic kidney disease stage IIIa  - Creatinine trending down 1.7---> 1.4 and UA trace blood only no sign of infection and no proteinuria  -Discussed with patient's son states patient had kidney problem at baseline and was admitted at a different hospital couple of weeks ago with UTI  -As patient is DNR CCA will not do any work-up or consult but will continue to monitor     # Hypertension: Continue home dose of metoprolol, hold Lasix     # Severe osteoarthritis: On Norco at home continue as needed Norco.     Elevated troponin:   Initial troponin elevated, repeat WNL  -Likely demand ischemia secondary to pneumonia, no chest pain, EKG's showed no ST-T wave changes, and normal sinus rhythm    Diet ADULT ORAL NUTRITION SUPPLEMENT; Breakfast, Lunch, Dinner; Standard High Calorie/High Protein Oral Supplement  ADULT DIET; Regular   DVT Prophylaxis [] Lovenox, [x]  Heparin, [] SCDs, [] Ambulation,  [] Eliquis, [] Xarelto  [] Coumadin   Code Status DNR-CCA   Disposition From: Home  Expected Disposition: Home  Estimated Date of Discharge: 3-4 days  Patient requires continued admission due to CAP         Subjective:     Chief Complaint: Emesis, Fatigue, and Cough                Review of Systems:    Review of Systems   Constitutional:  Positive for activity change and appetite change. Negative for diaphoresis and fatigue. HENT:  Negative for congestion and sore throat. Eyes:  Negative for discharge and visual disturbance. Respiratory:  Negative for cough, shortness of breath and wheezing. Cardiovascular:  Negative for chest pain, palpitations and leg swelling. Gastrointestinal:  Negative for abdominal distention, constipation and diarrhea. Genitourinary:  Negative for difficulty urinating and hematuria. Musculoskeletal:  Positive for arthralgias. Negative for back pain and gait problem. Neurological:  Negative for dizziness, syncope and speech difficulty. Hematological:  Negative for adenopathy. Psychiatric/Behavioral:  Positive for behavioral problems and confusion. Negative for agitation. All other systems reviewed and are negative. Objective:      Intake/Output Summary (Last 24 hours) at 11/15/2022 0824  Last data filed at 11/14/2022 2004  Gross per 24 hour   Intake 240 ml   Output --   Net 240 ml        Vitals:   Vitals:    11/15/22 0806   BP: (!) 178/93   Pulse: 78   Resp: 14   Temp: 97.4 °F (36.3 °C)   SpO2: 97%       Physical Exam:   Physical Exam  Vitals and nursing note reviewed. Constitutional:       General: She is not in acute distress. Appearance: She is ill-appearing. HENT:      Head: Normocephalic and atraumatic. Nose: Nose normal.      Mouth/Throat:      Mouth: Mucous membranes are moist.   Eyes:      Extraocular Movements: Extraocular movements intact. Pupils: Pupils are equal, round, and reactive to light. Cardiovascular:      Rate and Rhythm: Normal rate and regular rhythm. Pulses: Normal pulses. Heart sounds: Normal heart sounds. Pulmonary:      Effort: Pulmonary effort is normal.      Breath sounds: Normal breath sounds. Abdominal:      Palpations: Abdomen is soft. Musculoskeletal:         General: Normal range of motion. Cervical back: Normal range of motion. Skin:     General: Skin is warm. Capillary Refill: Capillary refill takes less than 2 seconds. Neurological:      General: No focal deficit present. Mental Status: She is alert. She is disoriented. Psychiatric:         Mood and Affect: Mood normal.         Behavior: Behavior normal.         Thought Content: Thought content is delusional.         Cognition and Memory: Cognition is impaired.           Medications:   Medications:    cefepime  2,000 mg IntraVENous Once    sodium chloride flush  5-40 mL IntraVENous 2 times per day    heparin (porcine)  5,000 Units SubCUTAneous BID    cefTRIAXone (ROCEPHIN) IV  1,000 mg IntraVENous Q24H    azithromycin  500 mg IntraVENous Q24H    cyanocobalamin  1,000 mcg Oral Daily    ferrous sulfate  325 mg Oral Daily with breakfast    metoprolol tartrate  12.5 mg Oral BID    pantoprazole  40 mg Oral QAM AC    atorvastatin  20 mg Oral Daily      Infusions:    sodium chloride       PRN Meds: sodium chloride flush, 5-40 mL, PRN  sodium chloride, , PRN  ondansetron, 4 mg, Q8H PRN   Or  ondansetron, 4 mg, Q6H PRN  polyethylene glycol, 17 g, Daily PRN  acetaminophen, 650 mg, Q6H PRN   Or  acetaminophen, 650 mg, Q6H PRN  LORazepam, 1 mg, Q12H PRN  HYDROcodone-acetaminophen, 1 tablet, Q6H PRN        Labs      Recent Results (from the past 24 hour(s))   CBC with Auto Differential    Collection Time: 11/15/22  4:00 AM   Result Value Ref Range    WBC 7.5 4.0 - 10.5 K/CU MM    RBC 3.50 (L) 4.2 - 5.4 M/CU MM    Hemoglobin 10.6 (L) 12.5 - 16.0 GM/DL    Hematocrit 32.8 (L) 37 - 47 %    MCV 93.7 78 - 100 FL    MCH 30.3 27 - 31 PG    MCHC 32.3 32.0 - 36.0 %    RDW 13.3 11.7 - 14.9 %    Platelets 057 422 - 266 K/CU MM    MPV 10.5 7.5 - 11.1 FL    Differential Type AUTOMATED DIFFERENTIAL     Segs Relative 66.1 (H) 36 - 66 %    Lymphocytes % 20.7 (L) 24 - 44 %    Monocytes % 10.4 (H) 0 - 4 %    Eosinophils % 2.0 0 - 3 %    Basophils % 0.5 0 - 1 %    Segs Absolute 5.0 K/CU MM    Lymphocytes Absolute 1.6 K/CU MM    Monocytes Absolute 0.8 K/CU MM    Eosinophils Absolute 0.2 K/CU MM    Basophils Absolute 0.0 K/CU MM    Nucleated RBC % 0.0 %    Total Nucleated RBC 0.0 K/CU MM    Total Immature Neutrophil 0.02 K/CU MM    Immature Neutrophil % 0.3 0 - 0.43 %   Basic Metabolic Panel w/ Reflex to MG    Collection Time: 11/15/22  4:00 AM   Result Value Ref Range    Sodium 135 135 - 145 MMOL/L    Potassium 4.6 3.5 - 5.1 MMOL/L    Chloride 100 99 - 110 mMol/L    CO2 25 21 - 32 MMOL/L    Anion Gap 10 4 - 16    BUN 29 (H) 6 - 23 MG/DL    Creatinine 1.1 0.6 - 1.1 MG/DL    Est, Glom Filt Rate 47 (L) >60 mL/min/1.73m2    Glucose 104 (H) 70 - 99 MG/DL    Calcium 8.7 8.3 - 10.6 MG/DL        Imaging/Diagnostics Last 24 Hours   No results found.     Electronically signed by Liiver Perez MD on 11/15/2022 at 8:24 AM

## 2022-11-15 NOTE — CARE COORDINATION
Met with patient's son for discharge concerns. He wants to take the patient home as she does better mentally at home. [facility]; no preference. Discussed [facility] and he is agreeable. Order obtained from [address] [place] [address].  Braeden Mace RN

## 2022-11-15 NOTE — PROGRESS NOTES
Physical Therapy  Attempted to see patient this PM. RN states that she has been agitated and just calmed down. Would like to give patient time to rest. Will attempt when more appropriate.    Clement Fay  3:17 PM.  11/15/2022

## 2022-11-16 VITALS
BODY MASS INDEX: 15.95 KG/M2 | WEIGHT: 90 LBS | HEIGHT: 63 IN | OXYGEN SATURATION: 98 % | HEART RATE: 70 BPM | SYSTOLIC BLOOD PRESSURE: 163 MMHG | RESPIRATION RATE: 18 BRPM | DIASTOLIC BLOOD PRESSURE: 66 MMHG | TEMPERATURE: 98.3 F

## 2022-11-16 LAB
MYCOPLASMA PNEUMONIAE IGG: 0.34 U/L
MYCOPLASMA PNEUMONIAE IGM: 0.13 U/L

## 2022-11-16 PROCEDURE — 6370000000 HC RX 637 (ALT 250 FOR IP): Performed by: STUDENT IN AN ORGANIZED HEALTH CARE EDUCATION/TRAINING PROGRAM

## 2022-11-16 PROCEDURE — 94761 N-INVAS EAR/PLS OXIMETRY MLT: CPT

## 2022-11-16 PROCEDURE — 2580000003 HC RX 258: Performed by: STUDENT IN AN ORGANIZED HEALTH CARE EDUCATION/TRAINING PROGRAM

## 2022-11-16 RX ORDER — LANOLIN ALCOHOL/MO/W.PET/CERES
3 CREAM (GRAM) TOPICAL NIGHTLY PRN
Qty: 30 TABLET | Refills: 3 | Status: SHIPPED | OUTPATIENT
Start: 2022-11-16 | End: 2022-12-16

## 2022-11-16 RX ORDER — DOXYCYCLINE HYCLATE 100 MG
100 TABLET ORAL EVERY 12 HOURS SCHEDULED
Status: DISCONTINUED | OUTPATIENT
Start: 2022-11-16 | End: 2022-11-16 | Stop reason: HOSPADM

## 2022-11-16 RX ORDER — DOXYCYCLINE HYCLATE 100 MG
100 TABLET ORAL EVERY 12 HOURS SCHEDULED
Qty: 13 TABLET | Refills: 0 | Status: SHIPPED | OUTPATIENT
Start: 2022-11-16 | End: 2022-11-23

## 2022-11-16 RX ADMIN — METOPROLOL TARTRATE 12.5 MG: 25 TABLET, FILM COATED ORAL at 09:33

## 2022-11-16 RX ADMIN — DOXYCYCLINE HYCLATE 100 MG: 100 TABLET, COATED ORAL at 09:32

## 2022-11-16 RX ADMIN — FERROUS SULFATE TAB 325 MG (65 MG ELEMENTAL FE) 325 MG: 325 (65 FE) TAB at 09:32

## 2022-11-16 RX ADMIN — ATORVASTATIN CALCIUM 20 MG: 10 TABLET, FILM COATED ORAL at 09:34

## 2022-11-16 RX ADMIN — SODIUM CHLORIDE, PRESERVATIVE FREE 10 ML: 5 INJECTION INTRAVENOUS at 09:34

## 2022-11-16 RX ADMIN — FUROSEMIDE 40 MG: 40 TABLET ORAL at 09:34

## 2022-11-16 RX ADMIN — AZITHROMYCIN MONOHYDRATE 500 MG: 250 TABLET ORAL at 09:33

## 2022-11-16 RX ADMIN — CYANOCOBALAMIN TAB 1000 MCG 1000 MCG: 1000 TAB at 09:32

## 2022-11-16 RX ADMIN — POTASSIUM CHLORIDE 20 MEQ: 1500 TABLET, EXTENDED RELEASE ORAL at 09:32

## 2022-11-16 NOTE — PLAN OF CARE
Problem: Pain  Goal: Verbalizes/displays adequate comfort level or baseline comfort level  Outcome: Adequate for Discharge     Problem: Safety - Adult  Goal: Free from fall injury  Outcome: Adequate for Discharge     Problem: ABCDS Injury Assessment  Goal: Absence of physical injury  Outcome: Adequate for Discharge     Problem: Skin/Tissue Integrity  Goal: Absence of new skin breakdown  Description: 1. Monitor for areas of redness and/or skin breakdown  2. Assess vascular access sites hourly  3. Every 4-6 hours minimum:  Change oxygen saturation probe site  4. Every 4-6 hours:  If on nasal continuous positive airway pressure, respiratory therapy assess nares and determine need for appliance change or resting period.   Outcome: Adequate for Discharge

## 2022-11-16 NOTE — CARE COORDINATION
[facility] Liaison spoke with son Darlene Espino. Verified pcp, address and number. Aware of discharge & will initiate [address]. No wounds/IVs noted. Left msg at pcp office for further hhc orders.

## 2022-11-16 NOTE — DISCHARGE SUMMARY
V2.0  Discharge Summary    Name:  Miroslava Klein /Age/Sex: 1928 (80 y.o. female)   Admit Date: 2022  Discharge Date: 22    MRN & CSN:  [id number] & [id number] Encounter Date and Time 22 10:34 AM EST    Attending:  Igor Colmenares MD Discharging Provider: Igor Colmenares MD       Hospital Course:     Brief HPI: Miroslava Klein is a 80 y.o. female who presented with  Shortness of breath. Initial work-up notable for leukocytosis with elevated procalcitonin level. Lactic acid within normal limits. Chest x-ray suggestive of right-sided pneumonia. Patient was diagnosed with sepsis secondary to community-acquired pneumonia. Patient admitted for antibiotics. Patient lab work was also notable for acute on chronic kidney disease. Kidney function improved during the course of hospitalization. Patient's hospital course was also notable for hospital induced delirium, which improved with improved sleep hygiene. PT OT evaluated the patient and recommended SNF. However patient refused. Extensively counseled the patient and her son Abdullahi. Family states they are confident that they can take care of the patient at home. Patient complained of polyuria close to day of discharge. UA notable for suspected UTI. Patient has a history of multiple UTI in the past secondary to urethral stenosis as per documentation from [address] [place] [address]. We will treat patient with doxycycline as she is allergic to sulfa and has reactions to nitrofurantoin in the past.  Patient optimized for discharge home. Advised patient to follow-up with PCP and urology postdischarge. Patient and son verbalized understanding and all questions were answered.     Brief Problem Based Course:   # Sepsis secondary to community-acquired pneumonia:  - Respiratory rate 22, WBC 22.3--->17.0-->11, with chest x-ray suggestive of right-sided pneumonia, Pro-Evgeny 5.42, lactic acid 1.1  - continue Rocephin and azithromycin  - Follow-up blood culture NTD, urine strep/Legionella antigen-pending not collected at, mycoplasma in process and rapid influenza negative  - On room air saturating well  - PT/OT evaluated and recommended SNF but patient refusing SNF. Discussed with patient's son at bedside he also agreed stating that patient does not want SNF and he is willing to take her home with home health care explained the risks, said understood. -plan for d/c today however patient has polyurea and has history of multiple UTI in the past, will get UA and d/c on appropriate regimen. Hospital-acquired delirium  As per overnight nausea patient has not slept at all overnight  This morning patient found to be confused and disoriented  Patient redirectable, will ensure sleep hygiene if she stays overnight     # Generalized weakness:  - Likely secondary to pneumonia, PT OT evaluated and recommended SNF     # Chronic kidney disease stage IIIa  - Creatinine trending down 1.7---> 1.4 and UA trace blood only no sign of infection and no proteinuria  -Discussed with patient's son states patient had kidney problem at baseline and was admitted at a different hospital couple of weeks ago with UTI  -As patient is DNR CCA will not do any work-up or consult but will continue to monitor     # Hypertension: Continue home dose of metoprolol, hold Lasix     # Severe osteoarthritis: On Norco at home continue as needed Norco.      The patient expressed appropriate understanding of, and agreement with the discharge recommendations, medications, and plan.      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    Discharge Instruction:   Follow up appointments: PCP, urology  Primary care physician: Nael Squires DO within 2 weeks  Diet: cardiac diet   Activity: activity as tolerated  Disposition: Discharged to:   [x]Home, [x][facility], []SNF, []Acute Rehab, []Hospice   Condition on discharge: Stable  Labs and Tests to be Followed up as an outpatient by PCP or Specialist:     Discharge Medications:        Medication List        START taking these medications      doxycycline hyclate 100 MG tablet  Commonly known as: VIBRA-TABS  Take 1 tablet by mouth every 12 hours for 13 doses     melatonin 3 MG Tabs tablet  Take 1 tablet by mouth nightly as needed (insomnia)            CONTINUE taking these medications      cyanocobalamin 1000 MCG tablet     ferrous sulfate 325 (65 Fe) MG tablet  Commonly known as: IRON 325     furosemide 40 MG tablet  Commonly known as: LASIX     gentamicin 0.1 % ointment  Commonly known as: GARAMYCIN     HYDROcodone-acetaminophen 5-325 MG per tablet  Commonly known as: NORCO     LORazepam 1 MG tablet  Commonly known as: ATIVAN     metoprolol tartrate 25 MG tablet  Commonly known as: LOPRESSOR     omeprazole 40 MG delayed release capsule  Commonly known as: PRILOSEC     ondansetron 4 MG disintegrating tablet  Commonly known as: Zofran ODT  Take 1 tablet by mouth every 8 hours as needed for Nausea     potassium chloride 20 MEQ extended release tablet  Commonly known as: KLOR-CON M     promethazine 25 MG tablet  Commonly known as: PHENERGAN     simvastatin 40 MG tablet  Commonly known as: ZOCOR            STOP taking these medications      warfarin 2 MG tablet  Commonly known as: COUMADIN               Where to Get Your Medications        These medications were sent to [address] [place] [address] [address] - [address] [phone]  [facility], [address]      Phone: [phone]   doxycycline hyclate 100 MG tablet  melatonin 3 MG Tabs tablet        Objective Findings at Discharge:   BP (!) 163/66   Pulse 70   Temp 98.3 °F (36.8 °C) (Axillary)   Resp 18   Ht 5' 2.99\" (1.6 m)   Wt 90 lb (40.8 kg)   SpO2 98%   BMI 15.95 kg/m²       Physical Exam:   Physical Exam  Vitals and nursing note reviewed. Constitutional:       General: She is not in acute distress. Appearance: Normal appearance. She is ill-appearing. Comments: Elderly and frail patient   HENT:      Head: Normocephalic and atraumatic. Nose: Nose normal.      Mouth/Throat:      Mouth: Mucous membranes are moist.   Eyes:      Extraocular Movements: Extraocular movements intact. Pupils: Pupils are equal, round, and reactive to light. Cardiovascular:      Rate and Rhythm: Normal rate and regular rhythm. Pulses: Normal pulses. Heart sounds: Normal heart sounds. Pulmonary:      Effort: Pulmonary effort is normal.      Breath sounds: Normal breath sounds. Abdominal:      Palpations: Abdomen is soft. Musculoskeletal:         General: Normal range of motion. Cervical back: Normal range of motion. Skin:     General: Skin is warm. Capillary Refill: Capillary refill takes less than 2 seconds. Neurological:      General: No focal deficit present. Mental Status: She is alert and oriented to person, place, and time. Mental status is at baseline. Psychiatric:         Mood and Affect: Mood normal.         Behavior: Behavior normal.            Labs and Imaging   XR CHEST PORTABLE    Result Date: 11/12/2022  EXAMINATION: ONE XRAY VIEW OF THE CHEST 11/12/2022 2:48 pm COMPARISON: None. HISTORY: ORDERING SYSTEM PROVIDED HISTORY: weak for 1 day TECHNOLOGIST PROVIDED HISTORY: Reason for exam:->weak for 1 day Reason for Exam: weak for 1 day FINDINGS: Status post median sternotomy. Heart size within normal limits. Right parahilar airspace opacity. No pneumothorax. Retrocardiac density suggesting a hiatal hernia.      Right parahilar airspace opacity could represent pneumonia Hiatal hernia        CBC:   Recent Labs     11/14/22  0637 11/15/22  0400   WBC 11.3* 7.5   HGB 11.3* 10.6*    172     BMP:    Recent Labs     11/14/22  0637 11/15/22  0400   * 135   K 4.4 4.6   CL 98* 100   CO2 26 25   BUN 35* 29*   CREATININE 1.4* 1.1   GLUCOSE 78 104*     Hepatic: No results for input(s): AST, ALT, ALB, BILITOT, ALKPHOS in the last 72 hours. Lipids: No results found for: CHOL, HDL, TRIG  Hemoglobin A1C: No results found for: LABA1C  TSH: No results found for: TSH  Troponin:   Lab Results   Component Value Date/Time    TROPONINT <0.010 11/13/2022 03:36 AM    TROPONINT 0.012 11/12/2022 02:22 PM     Lactic Acid: No results for input(s): LACTA in the last 72 hours. BNP: No results for input(s): PROBNP in the last 72 hours.   UA:  Lab Results   Component Value Date/Time    NITRU NEGATIVE 11/15/2022 02:20 PM    COLORU YELLOW 11/15/2022 02:20 PM    WBCUA 10 11/15/2022 02:20 PM    RBCUA 2 11/15/2022 02:20 PM    TRICHOMONAS NONE SEEN 11/15/2022 02:20 PM    BACTERIA RARE 11/15/2022 02:20 PM    CLARITYU CLEAR 11/15/2022 02:20 PM    SPECGRAV 1.010 11/15/2022 02:20 PM    LEUKOCYTESUR SMALL NUMBER OR AMOUNT OBSERVED 11/15/2022 02:20 PM    UROBILINOGEN 0.2 11/15/2022 02:20 PM    BILIRUBINUR NEGATIVE 11/15/2022 02:20 PM    BLOODU SMALL NUMBER OR AMOUNT OBSERVED 11/15/2022 02:20 PM    Mary Son NEGATIVE 11/15/2022 02:20 PM     Urine Cultures: No results found for: LABURIN  Blood Cultures: No results found for: BC  No results found for: BLOODCULT2  Organism: No results found for: ORG    Time Spent Discharging patient 35 minutes    Electronically signed by Destiny Yanez MD on 11/16/2022 at 10:34 AM

## 2022-11-16 NOTE — PROGRESS NOTES
Pt is alert, dementia at baseline, but appropriate and coopeative with staff. Son at bedside. Physicin at bedside. Pt discharged per physician order, son agreeable. Pt and son educated on discharge paperwork, instructions, medications, followups, verbalize understanding. Pt has all belongings, iv removed, off telemetry, wheeled down by staff and dischartged to self/family without complaints.

## 2022-11-17 LAB
CULTURE: NORMAL
CULTURE: NORMAL
Lab: NORMAL
Lab: NORMAL
SPECIMEN: NORMAL
SPECIMEN: NORMAL

## 2022-11-29 ENCOUNTER — APPOINTMENT (OUTPATIENT)
Dept: GENERAL RADIOLOGY | Age: 87
End: 2022-11-29
Payer: MEDICARE

## 2022-11-29 ENCOUNTER — HOSPITAL ENCOUNTER (EMERGENCY)
Age: 87
Discharge: HOME | End: 2022-11-29
Attending: EMERGENCY MEDICINE
Payer: MEDICARE

## 2022-11-29 VITALS
HEIGHT: 62 IN | DIASTOLIC BLOOD PRESSURE: 90 MMHG | WEIGHT: 90 LBS | RESPIRATION RATE: 17 BRPM | TEMPERATURE: 98 F | OXYGEN SATURATION: 94 % | HEART RATE: 84 BPM | BODY MASS INDEX: 16.56 KG/M2 | SYSTOLIC BLOOD PRESSURE: 147 MMHG

## 2022-11-29 DIAGNOSIS — R52 BODY ACHES: Primary | ICD-10-CM

## 2022-11-29 DIAGNOSIS — R53.1 GENERAL WEAKNESS: ICD-10-CM

## 2022-11-29 DIAGNOSIS — N28.9 RENAL INSUFFICIENCY: ICD-10-CM

## 2022-11-29 DIAGNOSIS — E86.0 DEHYDRATION, MODERATE: ICD-10-CM

## 2022-11-29 LAB
ALBUMIN SERPL-MCNC: 3.6 GM/DL (ref 3.4–5)
ALP BLD-CCNC: 61 IU/L (ref 40–129)
ALT SERPL-CCNC: 5 U/L (ref 7–35)
ANION GAP SERPL CALCULATED.3IONS-SCNC: 10 MMOL/L (ref 4–16)
ANION GAP SERPL CALCULATED.3IONS-SCNC: 12 MMOL/L (ref 4–16)
AST SERPL-CCNC: 19 IU/L (ref 15–37)
BACTERIA: NEGATIVE /HPF
BASOPHILS ABSOLUTE: 0.1 K/CU MM
BASOPHILS RELATIVE PERCENT: 1 % (ref 0–1)
BILIRUB SERPL-MCNC: 0.5 MG/DL (ref 0–1)
BILIRUBIN URINE: NEGATIVE MG/DL
BLOOD, URINE: NEGATIVE
BUN BLDV-MCNC: 45 MG/DL (ref 6–23)
BUN BLDV-MCNC: 49 MG/DL (ref 6–23)
CALCIUM SERPL-MCNC: 8.9 MG/DL (ref 8.3–10.6)
CALCIUM SERPL-MCNC: 9.5 MG/DL (ref 8.3–10.6)
CAST TYPE: ABNORMAL /HPF
CHLORIDE BLD-SCNC: 104 MMOL/L (ref 99–110)
CHLORIDE BLD-SCNC: 105 MMOL/L (ref 99–110)
CLARITY: CLEAR
CO2: 27 MMOL/L (ref 21–32)
CO2: 27 MMOL/L (ref 21–32)
COLOR: YELLOW
CREAT SERPL-MCNC: 1.6 MG/DL (ref 0.6–1.1)
CREAT SERPL-MCNC: 1.8 MG/DL (ref 0.6–1.1)
CRYSTAL TYPE: NEGATIVE /HPF
DIFFERENTIAL TYPE: ABNORMAL
EOSINOPHILS ABSOLUTE: 0.2 K/CU MM
EOSINOPHILS RELATIVE PERCENT: 1.4 % (ref 0–3)
EPITHELIAL CELLS, UA: 7 /HPF
GFR SERPL CREATININE-BSD FRML MDRD: 26 ML/MIN/1.73M2
GFR SERPL CREATININE-BSD FRML MDRD: 30 ML/MIN/1.73M2
GLUCOSE BLD-MCNC: 96 MG/DL (ref 70–99)
GLUCOSE BLD-MCNC: 99 MG/DL (ref 70–99)
GLUCOSE, URINE: NEGATIVE MG/DL
HCT VFR BLD CALC: 43.1 % (ref 37–47)
HEMOGLOBIN: 13.5 GM/DL (ref 12.5–16)
IMMATURE NEUTROPHIL %: 0.3 % (ref 0–0.43)
KETONES, URINE: NEGATIVE MG/DL
LACTATE: 1.2 MMOL/L (ref 0.4–2)
LEUKOCYTE ESTERASE, URINE: ABNORMAL
LYMPHOCYTES ABSOLUTE: 1.3 K/CU MM
LYMPHOCYTES RELATIVE PERCENT: 10.3 % (ref 24–44)
MAGNESIUM: 1.8 MG/DL (ref 1.8–2.4)
MCH RBC QN AUTO: 29.9 PG (ref 27–31)
MCHC RBC AUTO-ENTMCNC: 31.3 % (ref 32–36)
MCV RBC AUTO: 95.4 FL (ref 78–100)
MONOCYTES ABSOLUTE: 0.6 K/CU MM
MONOCYTES RELATIVE PERCENT: 5 % (ref 0–4)
NITRITE URINE, QUANTITATIVE: NEGATIVE
PDW BLD-RTO: 14.2 % (ref 11.7–14.9)
PH, URINE: 6 (ref 5–8)
PLATELET # BLD: 243 K/CU MM (ref 140–440)
PMV BLD AUTO: 10.1 FL (ref 7.5–11.1)
POTASSIUM SERPL-SCNC: 4.5 MMOL/L (ref 3.5–5.1)
POTASSIUM SERPL-SCNC: 5 MMOL/L (ref 3.5–5.1)
PROTEIN UA: NEGATIVE MG/DL
RAPID INFLUENZA  B AGN: NEGATIVE
RAPID INFLUENZA A AGN: NEGATIVE
RBC # BLD: 4.52 M/CU MM (ref 4.2–5.4)
RBC URINE: 3 /HPF (ref 0–6)
SARS-COV-2, NAAT: NOT DETECTED
SEGMENTED NEUTROPHILS ABSOLUTE COUNT: 10.3 K/CU MM
SEGMENTED NEUTROPHILS RELATIVE PERCENT: 82 % (ref 36–66)
SODIUM BLD-SCNC: 142 MMOL/L (ref 135–145)
SODIUM BLD-SCNC: 143 MMOL/L (ref 135–145)
SOURCE: NORMAL
SPECIFIC GRAVITY UA: 1.01 (ref 1–1.03)
TOTAL IMMATURE NEUTOROPHIL: 0.04 K/CU MM
TOTAL PROTEIN: 6.9 GM/DL (ref 6.4–8.2)
TROPONIN T: <0.01 NG/ML
UROBILINOGEN, URINE: 0.2 MG/DL (ref 0.2–1)
WBC # BLD: 12.6 K/CU MM (ref 4–10.5)
WBC UA: 7 /HPF (ref 0–5)

## 2022-11-29 PROCEDURE — 80048 BASIC METABOLIC PNL TOTAL CA: CPT

## 2022-11-29 PROCEDURE — 2580000003 HC RX 258: Performed by: EMERGENCY MEDICINE

## 2022-11-29 PROCEDURE — 96360 HYDRATION IV INFUSION INIT: CPT

## 2022-11-29 PROCEDURE — 93005 ELECTROCARDIOGRAM TRACING: CPT | Performed by: EMERGENCY MEDICINE

## 2022-11-29 PROCEDURE — 83605 ASSAY OF LACTIC ACID: CPT

## 2022-11-29 PROCEDURE — 96361 HYDRATE IV INFUSION ADD-ON: CPT

## 2022-11-29 PROCEDURE — 99285 EMERGENCY DEPT VISIT HI MDM: CPT | Performed by: EMERGENCY MEDICINE

## 2022-11-29 PROCEDURE — 87804 INFLUENZA ASSAY W/OPTIC: CPT

## 2022-11-29 PROCEDURE — 87635 SARS-COV-2 COVID-19 AMP PRB: CPT

## 2022-11-29 PROCEDURE — 81001 URINALYSIS AUTO W/SCOPE: CPT

## 2022-11-29 PROCEDURE — 84484 ASSAY OF TROPONIN QUANT: CPT

## 2022-11-29 PROCEDURE — 87040 BLOOD CULTURE FOR BACTERIA: CPT

## 2022-11-29 PROCEDURE — 83735 ASSAY OF MAGNESIUM: CPT

## 2022-11-29 PROCEDURE — 71046 X-RAY EXAM CHEST 2 VIEWS: CPT

## 2022-11-29 PROCEDURE — 85025 COMPLETE CBC W/AUTO DIFF WBC: CPT

## 2022-11-29 PROCEDURE — 80053 COMPREHEN METABOLIC PANEL: CPT

## 2022-11-29 RX ORDER — SODIUM CHLORIDE, SODIUM LACTATE, POTASSIUM CHLORIDE, AND CALCIUM CHLORIDE .6; .31; .03; .02 G/100ML; G/100ML; G/100ML; G/100ML
1000 INJECTION, SOLUTION INTRAVENOUS ONCE
Status: COMPLETED | OUTPATIENT
Start: 2022-11-29 | End: 2022-11-29

## 2022-11-29 RX ADMIN — SODIUM CHLORIDE, POTASSIUM CHLORIDE, SODIUM LACTATE AND CALCIUM CHLORIDE 1000 ML: 600; 310; 30; 20 INJECTION, SOLUTION INTRAVENOUS at 09:57

## 2022-11-29 NOTE — ED NOTES
Krupa Curiel - [facility] - [phone] : call when ready for discharge     Karen Cherry  11/29/22 0857

## 2022-11-29 NOTE — ED NOTES
The patient put on her call light again. The patient want to know what is taking so long. Dr. Haider Nurse was notified again.        Gordon Coates RN  11/29/22 [id number]

## 2022-11-29 NOTE — ED NOTES
The patient was given warm blankets. She reports \"  I have been here long enough. \"  Dr. Jarrod Myers was notified.                  Titi Mishra RN  11/29/22 [id number]

## 2022-11-29 NOTE — ED PROVIDER NOTES
[id number]  [facility]      Pt Name: Tatum Rhoades  MRN: [id number]  Luis F 5/14/1928  Date of evaluation: 11/29/2022  Provider: Pam Salazar MD    CHIEF COMPLAINT       Chief Complaint   Patient presents with    Cough    Generalized Body Aches    Nasal Congestion         HISTORY OF PRESENT ILLNESS    HPI    Nursing Notes were reviewed. This is a 80 y.o. female who presents to the emergency department with multiple complaints including cough, congestion, generalized body aches, abdominal pain, and generalized weakness. Patient is somewhat hard of hearing and a poor historian, however she is able to answer most questions appropriately. She denies chest pain or significant difficulty breathing. She describes diffuse body aches but does not single out a particular area of pain. He denies distinct abdominal pain but does describe 1-2 episodes of dysuria in the past 1 to 2 days. She denies frequency. The patient states that she was recently treated for urinary tract infection but completed the course of outpatient antibiotics. Review of the medical record indicates that the patient was recently discharged from the hospital on November 16 after treatment for sepsis due to community-acquired pneumonia and chronic kidney disease. REVIEW OF SYSTEMS       Review of Systems    10 Systems were reviewed with this patient and all were negative with exception of those noted in the history of present illness above.       PAST MEDICAL HISTORY     Past Medical History:   Diagnosis Date    Acid reflux     Arthritis     CAD (coronary artery disease)     does not follow with cardiologist at this time    Cancer [facility])     skin cancer on her arm    CHF (congestive heart failure) ([facility] [address]     DDD (degenerative disc disease), cervical     \"uses walker\"    Dementia ([address]     Full dentures     Gallstone     \"they will not due surgery on this\"    Kenaitze (hard of hearing) \"right ear- no hearing aide\"    Hx of blood clots     \"had blood clot both legs few yrs ago\"\"use to be on Coumadin for this- off this now    Hyperlipidemia     Hypertension     PONV (postoperative nausea and vomiting)     UTI (urinary tract infection)     following with dr Shasha Pérez UTI- hospitalized for this twice- getting picc line to get antibiotics\" per son 9/12/2017    Wears glasses          SURGICAL HISTORY       Past Surgical History:   Procedure Laterality Date    BLADDER SUSPENSION  ? when    CARDIAC CATHETERIZATION      \"last one done approx 5 yrs ago in [place]\" per son 9/12/2017    CORONARY ARTERY BYPASS GRAFT  10+ yrs ago    \"done at [facility]\" son unsure # vessels    KIDNEY SURGERY      OTHER SURGICAL HISTORY      \"got hematomar right leg- removed June 2017\"    OTHER SURGICAL HISTORY Right 09/19/2017    removal tunnel catheter         CURRENT MEDICATIONS       Discharge Medication List as of 11/29/2022  1:38 PM        CONTINUE these medications which have NOT CHANGED    Details   melatonin 3 MG TABS tablet Take 1 tablet by mouth nightly as needed (insomnia), Disp-30 tablet, R-3Normal      ondansetron (ZOFRAN ODT) 4 MG disintegrating tablet Take 1 tablet by mouth every 8 hours as needed for Nausea, Disp-15 tablet, R-0Normal      cyanocobalamin 1000 MCG tablet Take 1,000 mcg by mouth daily      ferrous sulfate 325 (65 FE) MG tablet Take 325 mg by mouth daily (with breakfast)      furosemide (LASIX) 40 MG tablet Take 40 mg by mouth daily      gentamicin (GARAMYCIN) 0.1 % ointment Apply topically 3 times daily Apply topically 3 times daily.  To skin tears PRN, Topical, 3 TIMES DAILY, Until Discontinued, Historical Med      HYDROcodone-acetaminophen (NORCO) 5-325 MG per tablet Take 1-2 tablets by mouth every 6 hours as needed for Pain (1 qam, 1 qnoon, 2 qhs prn)      LORazepam (ATIVAN) 1 MG tablet Take 1 mg by mouth every 12 hours as needed for Anxiety      metoprolol tartrate (LOPRESSOR) 25 MG tablet Take 12.5 mg by mouth 2 times daily      omeprazole (PRILOSEC) 40 MG delayed release capsule Take 40 mg by mouth daily      potassium chloride (KLOR-CON M) 20 MEQ extended release tablet Take 20 mEq by mouth 2 times daily      promethazine (PHENERGAN) 25 MG tablet Take 25 mg by mouth every 6 hours as needed for Nausea      simvastatin (ZOCOR) 40 MG tablet Take 40 mg by mouth nightly             ALLERGIES     Ciprofloxacin, Ciprocinonide [fluocinolone], Sulfa antibiotics, Tylenol [acetaminophen], Keflex [cephalexin], Methenamine hippurate, and Nitrofuran derivatives    FAMILY HISTORY       Family History   Problem Relation Age of Onset    Breast Cancer Mother     Cancer Father         prostate          SOCIAL HISTORY       Social History     Socioeconomic History    Marital status:      Spouse name: None    Number of children: None    Years of education: None    Highest education level: None   Tobacco Use    Smoking status: Never    Smokeless tobacco: Never   Vaping Use    Vaping Use: Never used   Substance and Sexual Activity    Alcohol use: No    Drug use: No       PHYSICAL EXAM       ED Triage Vitals [11/29/22 0904]   BP Temp Temp Source Heart Rate Resp SpO2 Height Weight   (!) 147/90 98 °F (36.7 °C) Oral 84 17 94 % 5' 2\" (1.575 m) 90 lb (40.8 kg)       Physical Exam  Vitals and nursing note reviewed. Constitutional:       Appearance: She is not toxic-appearing or diaphoretic. HENT:      Head: Normocephalic and atraumatic. Nose: Nose normal.      Mouth/Throat:      Mouth: Mucous membranes are moist.   Eyes:      Extraocular Movements: Extraocular movements intact. Cardiovascular:      Rate and Rhythm: Normal rate and regular rhythm. Pulses: Normal pulses. Heart sounds: Normal heart sounds. Abdominal:      Palpations: Abdomen is soft. Tenderness: There is no abdominal tenderness. Musculoskeletal:         General: No signs of injury.    Skin:     General: Skin is warm and dry. Capillary Refill: Capillary refill takes less than 2 seconds. Neurological:      General: No focal deficit present. Mental Status: She is alert and oriented to person, place, and time. Psychiatric:         Mood and Affect: Mood normal.         Behavior: Behavior normal.       Vitals:    Vitals:    11/29/22 0904   BP: (!) 147/90   Pulse: 84   Resp: 17   Temp: 98 °F (36.7 °C)   TempSrc: Oral   SpO2: 94%   Weight: 90 lb (40.8 kg)   Height: 5' 2\" (1.575 m)       DIAGNOSTIC RESULTS     EKG: All EKG's are interpreted by the Emergency Department Physician who either signs or Co-signs this chart in the absence of a cardiologist.    Normal sinus rhythm. Ventricular to 70. OH is 138. QRS is 82. QTc is 48. There are no abnormal ST elevations or depressions. There are T wave inversions in lead V2. There is no significant change from prior EKG from November 12, 2022. RADIOLOGY:   Non-plain film images such as CT, Ultrasound and MRI are read by the radiologist. Plain radiographic images are visualized and preliminarily interpreted by the emergency physician with the below findings:    Interpretation per the Radiologist below, if available at the time of this note:    XR CHEST (2 VW)   Final Result   1. The previous infiltrates in the right lung have resolved. 2. Moderate hiatal hernia, unchanged.              LABS:  Labs Reviewed   CBC WITH AUTO DIFFERENTIAL - Abnormal; Notable for the following components:       Result Value    WBC 12.6 (*)     MCHC 31.3 (*)     Segs Relative 82.0 (*)     Lymphocytes % 10.3 (*)     Monocytes % 5.0 (*)     All other components within normal limits   COMPREHENSIVE METABOLIC PANEL - Abnormal; Notable for the following components:    BUN 49 (*)     Creatinine 1.8 (*)     Est, Glom Filt Rate 26 (*)     ALT 5 (*)     All other components within normal limits   URINALYSIS - Abnormal; Notable for the following components:    Leukocyte Esterase, Urine SMALL NUMBER OR AMOUNT OBSERVED (*)     All other components within normal limits   MICROSCOPIC URINALYSIS - Abnormal; Notable for the following components:    WBC, UA 7 (*)     All other components within normal limits   BASIC METABOLIC PANEL - Abnormal; Notable for the following components:    BUN 45 (*)     Creatinine 1.6 (*)     Est, Glom Filt Rate 30 (*)     All other components within normal limits   COVID-19, RAPID   RAPID INFLUENZA A/B ANTIGENS   CULTURE, BLOOD 1   CULTURE, BLOOD 1   MAGNESIUM   TROPONIN   LACTIC ACID       All other labs were within normal range or not returned as of this dictation. MEDICAL DECISION MAKING     Medications   lactated ringers bolus (0 mLs IntraVENous Stopped 11/29/22 1220)             Lenny Coma Scale  Eye Opening: Spontaneous  Best Verbal Response: Oriented  Best Motor Response: Obeys commands  [place]m Coma Scale Score: 15                     CIWA Assessment  BP: (!) 147/90  Heart Rate: 84                 MDM  There is no evidence of influenza or COVID-19 infection on laboratory evaluation. Urinalysis is normal and does not demonstrate a significant Laureano tract infection appropriate for antibiotic therapy. Chest x-ray shows no evidence of pneumonia. Initial BUN/creatinine indicates volume depletion and possible acute kidney injury. This may be secondary to dehydration and prerenal insufficiency. The patient is awake, responsive and has clear capacity to understand the risks and benefits of her treatment. I had a detailed conversation with her regarding her laboratory evaluation in the identification of acute kidney injury with elevated creatinine of 1.8. I also discussed the volume depletion and the need for IV fluid resuscitation. The patient clearly understands the risks and benefits of treatment. She has agreed to IV fluid resuscitation. She is agreed to have her creatinine reevaluated after IV fluid resuscitation.     Repeat chemistry indicates an improvement of her kidney function from 1.8-1.6, however the 1.6 does represent an ongoing renal insufficiency that is new from her prior measurement and may represent an acute kidney injury. The acute kidney injury is still likely to be due to volume depletion, however the patient does require evaluation by nephrology for more detailed evaluation. The patient currently has a living will and has prioritize comfort care. She understands the risks and benefits of treatment and she has elected to be discharged home rather than except admission to the hospital.  She does not want to be admitted for the possible acute kidney injury and she has said that she will follow-up with her primary care physician first thing in the morning. She has reliable family support. She has agreed to improve her fluid intake. I feel she is safe for discharge home and she can be likely managed on an outpatient basis within the next 24 hours and follow-up with her nephrologist.      Clinical Diagnoses Addressed  1. Body aches    2. General weakness    3. Renal insufficiency    4. Dehydration, moderate          CONSULTS:  None    PROCEDURES:  Unless otherwise noted below, none     Procedures      FINAL IMPRESSION      1. Body aches    2. General weakness    3. Renal insufficiency    4. Dehydration, moderate          DISPOSITION/PLAN   DISPOSITION Decision To Discharge 11/29/2022 11:43:03 AM      PATIENT REFERRED TO:  Collin Mendez DO  [id number] [facility] [address] [place] [address] [address] [id number] [phone]    Call in 1 day      DISCHARGE MEDICATIONS:  Discharge Medication List as of 11/29/2022  1:38 PM        Controlled Substances Monitoring:     No flowsheet data found.     Pawan Gray MD (electronically signed)  Attending Emergency Physician            Pawan Gray MD  11/29/22 1997

## 2022-12-01 LAB
EKG ATRIAL RATE: 70 BPM
EKG DIAGNOSIS: NORMAL
EKG P AXIS: 43 DEGREES
EKG P-R INTERVAL: 138 MS
EKG Q-T INTERVAL: 452 MS
EKG QRS DURATION: 82 MS
EKG QTC CALCULATION (BAZETT): 488 MS
EKG R AXIS: 10 DEGREES
EKG T AXIS: 40 DEGREES
EKG VENTRICULAR RATE: 70 BPM

## 2022-12-01 PROCEDURE — 93010 ELECTROCARDIOGRAM REPORT: CPT | Performed by: INTERNAL MEDICINE

## 2022-12-04 LAB
CULTURE: NORMAL
Lab: NORMAL
SPECIMEN: NORMAL

## 2023-03-27 ENCOUNTER — APPOINTMENT (OUTPATIENT)
Dept: GENERAL RADIOLOGY | Age: 88
DRG: 689 | End: 2023-03-27
Payer: MEDICARE

## 2023-03-27 ENCOUNTER — HOSPITAL ENCOUNTER (INPATIENT)
Age: 88
LOS: 3 days | Discharge: HOME | DRG: 689 | End: 2023-03-30
Attending: EMERGENCY MEDICINE | Admitting: HOSPITALIST
Payer: MEDICARE

## 2023-03-27 DIAGNOSIS — E87.5 HYPERKALEMIA: ICD-10-CM

## 2023-03-27 DIAGNOSIS — R53.1 GENERALIZED WEAKNESS: Primary | ICD-10-CM

## 2023-03-27 DIAGNOSIS — N39.0 ACUTE LOWER URINARY TRACT INFECTION: ICD-10-CM

## 2023-03-27 DIAGNOSIS — I10 ESSENTIAL HYPERTENSION: ICD-10-CM

## 2023-03-27 PROBLEM — N30.00 ACUTE CYSTITIS WITHOUT HEMATURIA: Status: ACTIVE | Noted: 2023-03-27

## 2023-03-27 LAB
ALBUMIN SERPL-MCNC: 3.2 GM/DL (ref 3.4–5)
ALP BLD-CCNC: 56 IU/L (ref 40–129)
ALT SERPL-CCNC: <5 U/L (ref 10–40)
ANION GAP SERPL CALCULATED.3IONS-SCNC: 7 MMOL/L (ref 4–16)
AST SERPL-CCNC: 18 IU/L (ref 15–37)
BACTERIA: ABNORMAL /HPF
BASOPHILS ABSOLUTE: 0.1 K/CU MM
BASOPHILS RELATIVE PERCENT: 1 % (ref 0–1)
BILIRUB SERPL-MCNC: 0.4 MG/DL (ref 0–1)
BILIRUBIN URINE: NEGATIVE MG/DL
BLOOD, URINE: ABNORMAL
BUN SERPL-MCNC: 29 MG/DL (ref 6–23)
CALCIUM SERPL-MCNC: 9.4 MG/DL (ref 8.3–10.6)
CAST TYPE: ABNORMAL /HPF
CHLORIDE BLD-SCNC: 103 MMOL/L (ref 99–110)
CLARITY: CLEAR
CO2: 28 MMOL/L (ref 21–32)
COLOR: YELLOW
COMMENT UA: ABNORMAL
CREAT SERPL-MCNC: 1.2 MG/DL (ref 0.6–1.1)
CRYSTAL TYPE: NEGATIVE /HPF
DIFFERENTIAL TYPE: ABNORMAL
EKG ATRIAL RATE: 70 BPM
EKG DIAGNOSIS: NORMAL
EKG P AXIS: 51 DEGREES
EKG P-R INTERVAL: 138 MS
EKG Q-T INTERVAL: 424 MS
EKG QRS DURATION: 80 MS
EKG QTC CALCULATION (BAZETT): 457 MS
EKG R AXIS: 32 DEGREES
EKG T AXIS: 52 DEGREES
EKG VENTRICULAR RATE: 70 BPM
EOSINOPHILS ABSOLUTE: 0.3 K/CU MM
EOSINOPHILS RELATIVE PERCENT: 3.7 % (ref 0–3)
EPITHELIAL CELLS, UA: 2 /HPF
GFR SERPL CREATININE-BSD FRML MDRD: 42 ML/MIN/1.73M2
GLUCOSE SERPL-MCNC: 99 MG/DL (ref 70–99)
GLUCOSE, URINE: NEGATIVE MG/DL
HCT VFR BLD CALC: 43.1 % (ref 37–47)
HEMOGLOBIN: 13.9 GM/DL (ref 12.5–16)
IMMATURE NEUTROPHIL %: 0.4 % (ref 0–0.43)
KETONES, URINE: NEGATIVE MG/DL
LEUKOCYTE ESTERASE, URINE: ABNORMAL
LIPASE: 28 IU/L (ref 13–60)
LYMPHOCYTES ABSOLUTE: 1.8 K/CU MM
LYMPHOCYTES RELATIVE PERCENT: 26 % (ref 24–44)
MAGNESIUM: 1.7 MG/DL (ref 1.8–2.4)
MCH RBC QN AUTO: 30.2 PG (ref 27–31)
MCHC RBC AUTO-ENTMCNC: 32.3 % (ref 32–36)
MCV RBC AUTO: 93.5 FL (ref 78–100)
MONOCYTES ABSOLUTE: 0.5 K/CU MM
MONOCYTES RELATIVE PERCENT: 6.8 % (ref 0–4)
NITRITE URINE, QUANTITATIVE: POSITIVE
PDW BLD-RTO: 13.8 % (ref 11.7–14.9)
PH, URINE: 6.5 (ref 5–8)
PLATELET # BLD: 224 K/CU MM (ref 140–440)
PMV BLD AUTO: 9.9 FL (ref 7.5–11.1)
POTASSIUM SERPL-SCNC: 5.2 MMOL/L (ref 3.5–5.1)
PRO-BNP: 2322 PG/ML
PROTEIN UA: ABNORMAL MG/DL
RAPID INFLUENZA  B AGN: NEGATIVE
RAPID INFLUENZA A AGN: NEGATIVE
RBC # BLD: 4.61 M/CU MM (ref 4.2–5.4)
RBC URINE: 1 /HPF (ref 0–6)
SARS-COV-2 RDRP RESP QL NAA+PROBE: NOT DETECTED
SEGMENTED NEUTROPHILS ABSOLUTE COUNT: 4.4 K/CU MM
SEGMENTED NEUTROPHILS RELATIVE PERCENT: 62.1 % (ref 36–66)
SODIUM BLD-SCNC: 138 MMOL/L (ref 135–145)
SOURCE: NORMAL
SPECIFIC GRAVITY UA: <1.005 (ref 1–1.03)
TOTAL IMMATURE NEUTOROPHIL: 0.03 K/CU MM
TOTAL PROTEIN: 6.7 GM/DL (ref 6.4–8.2)
TROPONIN T: <0.01 NG/ML
TROPONIN T: <0.01 NG/ML
UROBILINOGEN, URINE: 1 MG/DL (ref 0.2–1)
WBC # BLD: 7.1 K/CU MM (ref 4–10.5)
WBC UA: 3 /HPF (ref 0–5)

## 2023-03-27 PROCEDURE — 81001 URINALYSIS AUTO W/SCOPE: CPT

## 2023-03-27 PROCEDURE — 96375 TX/PRO/DX INJ NEW DRUG ADDON: CPT

## 2023-03-27 PROCEDURE — 83735 ASSAY OF MAGNESIUM: CPT

## 2023-03-27 PROCEDURE — 93005 ELECTROCARDIOGRAM TRACING: CPT | Performed by: EMERGENCY MEDICINE

## 2023-03-27 PROCEDURE — 71045 X-RAY EXAM CHEST 1 VIEW: CPT

## 2023-03-27 PROCEDURE — 83690 ASSAY OF LIPASE: CPT

## 2023-03-27 PROCEDURE — 83880 ASSAY OF NATRIURETIC PEPTIDE: CPT

## 2023-03-27 PROCEDURE — 87086 URINE CULTURE/COLONY COUNT: CPT

## 2023-03-27 PROCEDURE — 6360000002 HC RX W HCPCS: Performed by: NURSE PRACTITIONER

## 2023-03-27 PROCEDURE — 99285 EMERGENCY DEPT VISIT HI MDM: CPT

## 2023-03-27 PROCEDURE — 84484 ASSAY OF TROPONIN QUANT: CPT

## 2023-03-27 PROCEDURE — 2580000003 HC RX 258: Performed by: NURSE PRACTITIONER

## 2023-03-27 PROCEDURE — 87804 INFLUENZA ASSAY W/OPTIC: CPT

## 2023-03-27 PROCEDURE — 1200000000 HC SEMI PRIVATE

## 2023-03-27 PROCEDURE — 96361 HYDRATE IV INFUSION ADD-ON: CPT

## 2023-03-27 PROCEDURE — 87040 BLOOD CULTURE FOR BACTERIA: CPT

## 2023-03-27 PROCEDURE — 96366 THER/PROPH/DIAG IV INF ADDON: CPT

## 2023-03-27 PROCEDURE — 6370000000 HC RX 637 (ALT 250 FOR IP): Performed by: NURSE PRACTITIONER

## 2023-03-27 PROCEDURE — 96365 THER/PROPH/DIAG IV INF INIT: CPT

## 2023-03-27 PROCEDURE — 2580000003 HC RX 258: Performed by: EMERGENCY MEDICINE

## 2023-03-27 PROCEDURE — 87635 SARS-COV-2 COVID-19 AMP PRB: CPT

## 2023-03-27 PROCEDURE — 93010 ELECTROCARDIOGRAM REPORT: CPT | Performed by: INTERNAL MEDICINE

## 2023-03-27 PROCEDURE — 6360000002 HC RX W HCPCS: Performed by: EMERGENCY MEDICINE

## 2023-03-27 PROCEDURE — 80053 COMPREHEN METABOLIC PANEL: CPT

## 2023-03-27 PROCEDURE — 36415 COLL VENOUS BLD VENIPUNCTURE: CPT

## 2023-03-27 PROCEDURE — 85025 COMPLETE CBC W/AUTO DIFF WBC: CPT

## 2023-03-27 RX ORDER — QUETIAPINE FUMARATE 25 MG/1
25 TABLET, FILM COATED ORAL NIGHTLY
Status: DISCONTINUED | OUTPATIENT
Start: 2023-03-27 | End: 2023-03-30 | Stop reason: HOSPADM

## 2023-03-27 RX ORDER — PANTOPRAZOLE SODIUM 40 MG/1
40 TABLET, DELAYED RELEASE ORAL DAILY
COMMUNITY
Start: 2023-01-21

## 2023-03-27 RX ORDER — 0.9 % SODIUM CHLORIDE 0.9 %
1000 INTRAVENOUS SOLUTION INTRAVENOUS ONCE
Status: COMPLETED | OUTPATIENT
Start: 2023-03-27 | End: 2023-03-27

## 2023-03-27 RX ORDER — SODIUM CHLORIDE 9 MG/ML
25 INJECTION, SOLUTION INTRAVENOUS PRN
Status: DISCONTINUED | OUTPATIENT
Start: 2023-03-27 | End: 2023-03-30 | Stop reason: HOSPADM

## 2023-03-27 RX ORDER — ONDANSETRON 2 MG/ML
4 INJECTION INTRAMUSCULAR; INTRAVENOUS EVERY 6 HOURS PRN
Status: DISCONTINUED | OUTPATIENT
Start: 2023-03-27 | End: 2023-03-30 | Stop reason: HOSPADM

## 2023-03-27 RX ORDER — FUROSEMIDE 40 MG/1
40 TABLET ORAL DAILY
Status: DISCONTINUED | OUTPATIENT
Start: 2023-03-27 | End: 2023-03-28

## 2023-03-27 RX ORDER — SODIUM CHLORIDE 9 MG/ML
INJECTION, SOLUTION INTRAVENOUS CONTINUOUS
Status: DISCONTINUED | OUTPATIENT
Start: 2023-03-27 | End: 2023-03-28

## 2023-03-27 RX ORDER — ONDANSETRON 2 MG/ML
4 INJECTION INTRAMUSCULAR; INTRAVENOUS ONCE
Status: COMPLETED | OUTPATIENT
Start: 2023-03-27 | End: 2023-03-27

## 2023-03-27 RX ORDER — TAMSULOSIN HYDROCHLORIDE 0.4 MG/1
0.4 CAPSULE ORAL DAILY
Status: DISCONTINUED | OUTPATIENT
Start: 2023-03-27 | End: 2023-03-30 | Stop reason: HOSPADM

## 2023-03-27 RX ORDER — POLYETHYLENE GLYCOL 3350 17 G/17G
17 POWDER, FOR SOLUTION ORAL DAILY PRN
Status: DISCONTINUED | OUTPATIENT
Start: 2023-03-27 | End: 2023-03-30 | Stop reason: HOSPADM

## 2023-03-27 RX ORDER — MECLIZINE HYDROCHLORIDE 25 MG/1
TABLET ORAL
COMMUNITY
Start: 2023-01-22

## 2023-03-27 RX ORDER — ONDANSETRON 4 MG/1
TABLET, FILM COATED ORAL
COMMUNITY
Start: 2023-01-22

## 2023-03-27 RX ORDER — QUETIAPINE FUMARATE 25 MG/1
TABLET, FILM COATED ORAL
COMMUNITY
Start: 2023-02-09

## 2023-03-27 RX ORDER — TAMSULOSIN HYDROCHLORIDE 0.4 MG/1
CAPSULE ORAL DAILY
COMMUNITY
Start: 2023-03-16

## 2023-03-27 RX ORDER — LORAZEPAM 1 MG/1
1 TABLET ORAL EVERY 12 HOURS PRN
Status: DISCONTINUED | OUTPATIENT
Start: 2023-03-27 | End: 2023-03-30 | Stop reason: HOSPADM

## 2023-03-27 RX ORDER — HYDROCODONE BITARTRATE AND ACETAMINOPHEN 5; 325 MG/1; MG/1
1 TABLET ORAL EVERY 6 HOURS PRN
Status: DISCONTINUED | OUTPATIENT
Start: 2023-03-27 | End: 2023-03-30 | Stop reason: HOSPADM

## 2023-03-27 RX ORDER — ONDANSETRON 4 MG/1
4 TABLET, ORALLY DISINTEGRATING ORAL EVERY 8 HOURS PRN
Status: DISCONTINUED | OUTPATIENT
Start: 2023-03-27 | End: 2023-03-30 | Stop reason: HOSPADM

## 2023-03-27 RX ORDER — PANTOPRAZOLE SODIUM 40 MG/1
40 TABLET, DELAYED RELEASE ORAL DAILY
Status: DISCONTINUED | OUTPATIENT
Start: 2023-03-27 | End: 2023-03-30 | Stop reason: HOSPADM

## 2023-03-27 RX ORDER — SODIUM CHLORIDE 0.9 % (FLUSH) 0.9 %
5-40 SYRINGE (ML) INJECTION EVERY 12 HOURS SCHEDULED
Status: DISCONTINUED | OUTPATIENT
Start: 2023-03-27 | End: 2023-03-30 | Stop reason: HOSPADM

## 2023-03-27 RX ORDER — HEPARIN SODIUM 5000 [USP'U]/ML
5000 INJECTION, SOLUTION INTRAVENOUS; SUBCUTANEOUS 2 TIMES DAILY
Status: DISCONTINUED | OUTPATIENT
Start: 2023-03-27 | End: 2023-03-30 | Stop reason: HOSPADM

## 2023-03-27 RX ORDER — SODIUM CHLORIDE 0.9 % (FLUSH) 0.9 %
5-40 SYRINGE (ML) INJECTION PRN
Status: DISCONTINUED | OUTPATIENT
Start: 2023-03-27 | End: 2023-03-30 | Stop reason: HOSPADM

## 2023-03-27 RX ORDER — METOPROLOL SUCCINATE 25 MG/1
25 TABLET, EXTENDED RELEASE ORAL DAILY
COMMUNITY
Start: 2023-03-17

## 2023-03-27 RX ADMIN — SODIUM CHLORIDE: 9 INJECTION, SOLUTION INTRAVENOUS at 18:23

## 2023-03-27 RX ADMIN — HYDROCODONE BITARTRATE AND ACETAMINOPHEN 1 TABLET: 5; 325 TABLET ORAL at 20:13

## 2023-03-27 RX ADMIN — SODIUM CHLORIDE, PRESERVATIVE FREE 10 ML: 5 INJECTION INTRAVENOUS at 21:57

## 2023-03-27 RX ADMIN — CEFTRIAXONE SODIUM 1000 MG: 1 INJECTION, POWDER, FOR SOLUTION INTRAMUSCULAR; INTRAVENOUS at 13:32

## 2023-03-27 RX ADMIN — METOPROLOL TARTRATE 12.5 MG: 25 TABLET, FILM COATED ORAL at 20:13

## 2023-03-27 RX ADMIN — QUETIAPINE FUMARATE 25 MG: 25 TABLET ORAL at 20:13

## 2023-03-27 RX ADMIN — SODIUM CHLORIDE 1000 ML: 9 INJECTION, SOLUTION INTRAVENOUS at 10:39

## 2023-03-27 RX ADMIN — HEPARIN SODIUM 5000 UNITS: 5000 INJECTION INTRAVENOUS; SUBCUTANEOUS at 20:13

## 2023-03-27 RX ADMIN — PANTOPRAZOLE SODIUM 40 MG: 40 TABLET, DELAYED RELEASE ORAL at 18:23

## 2023-03-27 RX ADMIN — ONDANSETRON 4 MG: 2 INJECTION INTRAMUSCULAR; INTRAVENOUS at 10:40

## 2023-03-27 RX ADMIN — TAMSULOSIN HYDROCHLORIDE 0.4 MG: 0.4 CAPSULE ORAL at 18:23

## 2023-03-27 ASSESSMENT — PAIN DESCRIPTION - DESCRIPTORS: DESCRIPTORS: DULL

## 2023-03-27 ASSESSMENT — PAIN - FUNCTIONAL ASSESSMENT
PAIN_FUNCTIONAL_ASSESSMENT: PREVENTS OR INTERFERES SOME ACTIVE ACTIVITIES AND ADLS
PAIN_FUNCTIONAL_ASSESSMENT: 0-10

## 2023-03-27 ASSESSMENT — PAIN DESCRIPTION - ONSET: ONSET: ON-GOING

## 2023-03-27 ASSESSMENT — PAIN SCALES - GENERAL
PAINLEVEL_OUTOF10: 5
PAINLEVEL_OUTOF10: 8

## 2023-03-27 ASSESSMENT — PAIN DESCRIPTION - FREQUENCY: FREQUENCY: CONTINUOUS

## 2023-03-27 ASSESSMENT — PAIN DESCRIPTION - PAIN TYPE: TYPE: CHRONIC PAIN

## 2023-03-27 ASSESSMENT — PAIN DESCRIPTION - LOCATION: LOCATION: GENERALIZED

## 2023-03-27 ASSESSMENT — LIFESTYLE VARIABLES: HOW OFTEN DO YOU HAVE A DRINK CONTAINING ALCOHOL: NEVER

## 2023-03-27 ASSESSMENT — PAIN DESCRIPTION - ORIENTATION: ORIENTATION: RIGHT;LEFT

## 2023-03-27 NOTE — ED NOTES
Second troponin drawn, labeled and walked to lab for processing.      Hitesh Arteaga RN  03/27/23 5291

## 2023-03-27 NOTE — ED NOTES
Blood collected with IV start, labeled and walked to lab for processing.       Mary Vera RN  03/27/23 7213

## 2023-03-27 NOTE — ED NOTES
purewick placed for urinary sanitation and comfort. Pt tolerated well. Call light left within reach and pt assisted to position of comfort. Pt denies needs.       Pretty Campos RN  03/27/23 9730

## 2023-03-27 NOTE — ED NOTES
Covid 19 & flu swab specimen collected, labeled and walked to lab for processing. Ching Pereira RN  03/27/23 2639

## 2023-03-27 NOTE — ED NOTES
Hand off report called to Miles Goetz at [facility] for bed [id number]     Kayley Gonzalez, RN  03/27/23 2332

## 2023-03-27 NOTE — PLAN OF CARE
Problem: Discharge Planning  Goal: Discharge to home or other facility with appropriate resources  Outcome: Not Progressing     Problem: Pain  Goal: Verbalizes/displays adequate comfort level or baseline comfort level  Outcome: Not Progressing     Problem: Safety - Adult  Goal: Free from fall injury  Outcome: Not Progressing     Problem: ABCDS Injury Assessment  Goal: Absence of physical injury  Outcome: Not Progressing     Problem: Discharge Planning  Goal: Discharge to home or other facility with appropriate resources  Outcome: Not Progressing     Problem: Pain  Goal: Verbalizes/displays adequate comfort level or baseline comfort level  Outcome: Not Progressing     Problem: Safety - Adult  Goal: Free from fall injury  Outcome: Not Progressing     Problem: ABCDS Injury Assessment  Goal: Absence of physical injury  Outcome: Not Progressing

## 2023-03-27 NOTE — ED PROVIDER NOTES
Other Topics Concern    Not on file   Social History Narrative    Not on file     Social Determinants of Health     Financial Resource Strain: Not on file   Food Insecurity: Not on file   Transportation Needs: Not on file   Physical Activity: Not on file   Stress: Not on file   Social Connections: Not on file   Intimate Partner Violence: Not on file   Housing Stability: Not on file     Current Facility-Administered Medications   Medication Dose Route Frequency Provider Last Rate Last Admin    0.9 % sodium chloride bolus  1,000 mL IntraVENous Once Lorraine Matta, DO        ondansetron [facility] injection 4 mg  4 mg IntraVENous Once Lorraine Matta, DO         Current Outpatient Medications   Medication Sig Dispense Refill    tamsulosin (FLOMAX) 0.4 MG capsule Take by mouth daily      QUEtiapine (SEROQUEL) 25 MG tablet TAKE 1 TABLET BY MOUTH AT BEDTIME. PATIENT NEEDS APPOINTMENT FOR FUTURE REFILLS. pantoprazole (PROTONIX) 40 MG tablet Take 40 mg by mouth daily      ondansetron (ZOFRAN) 4 MG tablet TAKE 1 TABLET BY MOUTH EVERY EIGHT HOURS AS NEEDED FOR NAUSEA      metoprolol succinate (TOPROL XL) 25 MG extended release tablet Take 25 mg by mouth daily      meclizine (ANTIVERT) 25 MG tablet TAKE 1 TABLET BY MOUTH EVERY EIGHT HOURS AS NEEDED FOR DIZZINESS      melatonin 3 MG TABS tablet Take 1 tablet by mouth nightly as needed (insomnia) 30 tablet 3    ondansetron (ZOFRAN ODT) 4 MG disintegrating tablet Take 1 tablet by mouth every 8 hours as needed for Nausea 15 tablet 0    cyanocobalamin 1000 MCG tablet Take 1,000 mcg by mouth daily      ferrous sulfate 325 (65 FE) MG tablet Take 325 mg by mouth daily (with breakfast)      furosemide (LASIX) 40 MG tablet Take 40 mg by mouth daily      gentamicin (GARAMYCIN) 0.1 % ointment Apply topically 3 times daily Apply topically 3 times daily.  To skin tears PRN      HYDROcodone-acetaminophen (NORCO) 5-325 MG per tablet Take 1-2 tablets by mouth every 6 hours as

## 2023-03-27 NOTE — ED NOTES
Patient depends saturated. Assisted patient in changing. New blankets applied and patient updated on arrival of transport. Call light is within reach and patient denies any further needs at this time.       Samara Reza RN  03/27/23 9390

## 2023-03-27 NOTE — H&P
03/27/2023 10:12 AM    KETUA NEGATIVE 03/27/2023 10:12 AM     Urine Cultures: No results found for: Juvenal Filter  Blood Cultures: No results found for: BC  No results found for: BLOODCULT2  Organism: No results found for: ORG    Imaging/Diagnostics Last 24 Hours   XR CHEST PORTABLE    Result Date: 3/27/2023  EXAMINATION: ONE XRAY VIEW OF THE CHEST 3/27/2023 10:26 am COMPARISON: 11/29/2022 HISTORY: ORDERING SYSTEM PROVIDED HISTORY: Chest Pain TECHNOLOGIST PROVIDED HISTORY: Reason for exam:->Chest Pain Reason for Exam: chest pain FINDINGS: Median sternotomy wires. The lungs are without acute focal process. There is no effusion or pneumothorax. The cardiomediastinal silhouette is stable. The osseous structures are stable. No acute process.  Stable exam.         Electronically signed by RYLEE Garg CNP on 3/27/2023 at 5:26 PM

## 2023-03-27 NOTE — ED NOTES
During assessment; wound noted on right heel. Bandage applied. Physician notified.       Debby Alvarez RN  03/27/23 1038

## 2023-03-27 NOTE — ED TRIAGE NOTES
Pt arrived via triage with son. Son report increased weakness, UTI symptoms and increased nausea. Pt is responsive to verbal stimulation. Pt respirations are even and unlabored. Pt reports nausea and pain in her feet with standing.

## 2023-03-28 PROBLEM — E43 SEVERE MALNUTRITION (HCC): Chronic | Status: ACTIVE | Noted: 2022-11-14

## 2023-03-28 LAB
ALBUMIN SERPL-MCNC: 2.9 GM/DL (ref 3.4–5)
ALP BLD-CCNC: 51 IU/L (ref 40–128)
ALT SERPL-CCNC: 5 U/L (ref 10–40)
ANION GAP SERPL CALCULATED.3IONS-SCNC: 8 MMOL/L (ref 4–16)
AST SERPL-CCNC: 13 IU/L (ref 15–37)
BASOPHILS ABSOLUTE: 0.1 K/CU MM
BASOPHILS RELATIVE PERCENT: 0.9 % (ref 0–1)
BILIRUB SERPL-MCNC: 0.3 MG/DL (ref 0–1)
BUN SERPL-MCNC: 22 MG/DL (ref 6–23)
CALCIUM SERPL-MCNC: 8.4 MG/DL (ref 8.3–10.6)
CHLORIDE BLD-SCNC: 112 MMOL/L (ref 99–110)
CO2: 22 MMOL/L (ref 21–32)
CREAT SERPL-MCNC: 1.1 MG/DL (ref 0.6–1.1)
CULTURE: NORMAL
DIFFERENTIAL TYPE: ABNORMAL
EOSINOPHILS ABSOLUTE: 0.2 K/CU MM
EOSINOPHILS RELATIVE PERCENT: 3.2 % (ref 0–3)
GFR SERPL CREATININE-BSD FRML MDRD: 47 ML/MIN/1.73M2
GLUCOSE SERPL-MCNC: 74 MG/DL (ref 70–99)
HCT VFR BLD CALC: 38.3 % (ref 37–47)
HEMOGLOBIN: 11.9 GM/DL (ref 12.5–16)
IMMATURE NEUTROPHIL %: 0.4 % (ref 0–0.43)
LYMPHOCYTES ABSOLUTE: 1.9 K/CU MM
LYMPHOCYTES RELATIVE PERCENT: 27.9 % (ref 24–44)
Lab: NORMAL
MCH RBC QN AUTO: 29.6 PG (ref 27–31)
MCHC RBC AUTO-ENTMCNC: 31.1 % (ref 32–36)
MCV RBC AUTO: 95.3 FL (ref 78–100)
MONOCYTES ABSOLUTE: 0.5 K/CU MM
MONOCYTES RELATIVE PERCENT: 7.5 % (ref 0–4)
NUCLEATED RBC %: 0 %
PDW BLD-RTO: 13.5 % (ref 11.7–14.9)
PLATELET # BLD: 213 K/CU MM (ref 140–440)
PMV BLD AUTO: 10 FL (ref 7.5–11.1)
POTASSIUM SERPL-SCNC: 4 MMOL/L (ref 3.5–5.1)
RBC # BLD: 4.02 M/CU MM (ref 4.2–5.4)
SEGMENTED NEUTROPHILS ABSOLUTE COUNT: 4.1 K/CU MM
SEGMENTED NEUTROPHILS RELATIVE PERCENT: 60.1 % (ref 36–66)
SODIUM BLD-SCNC: 142 MMOL/L (ref 135–145)
SPECIMEN: NORMAL
TOTAL IMMATURE NEUTOROPHIL: 0.03 K/CU MM
TOTAL NUCLEATED RBC: 0 K/CU MM
TOTAL PROTEIN: 5.2 GM/DL (ref 6.4–8.2)
WBC # BLD: 6.8 K/CU MM (ref 4–10.5)

## 2023-03-28 PROCEDURE — 2580000003 HC RX 258: Performed by: NURSE PRACTITIONER

## 2023-03-28 PROCEDURE — 94761 N-INVAS EAR/PLS OXIMETRY MLT: CPT

## 2023-03-28 PROCEDURE — 36415 COLL VENOUS BLD VENIPUNCTURE: CPT

## 2023-03-28 PROCEDURE — 6370000000 HC RX 637 (ALT 250 FOR IP): Performed by: NURSE PRACTITIONER

## 2023-03-28 PROCEDURE — 85025 COMPLETE CBC W/AUTO DIFF WBC: CPT

## 2023-03-28 PROCEDURE — 1200000000 HC SEMI PRIVATE

## 2023-03-28 PROCEDURE — 97166 OT EVAL MOD COMPLEX 45 MIN: CPT

## 2023-03-28 PROCEDURE — 80053 COMPREHEN METABOLIC PANEL: CPT

## 2023-03-28 PROCEDURE — 97116 GAIT TRAINING THERAPY: CPT

## 2023-03-28 PROCEDURE — 97162 PT EVAL MOD COMPLEX 30 MIN: CPT

## 2023-03-28 PROCEDURE — 6360000002 HC RX W HCPCS: Performed by: NURSE PRACTITIONER

## 2023-03-28 PROCEDURE — 6360000002 HC RX W HCPCS: Performed by: INTERNAL MEDICINE

## 2023-03-28 PROCEDURE — 99211 OFF/OP EST MAY X REQ PHY/QHP: CPT

## 2023-03-28 PROCEDURE — 97530 THERAPEUTIC ACTIVITIES: CPT

## 2023-03-28 RX ORDER — LISINOPRIL 5 MG/1
2.5 TABLET ORAL DAILY
Status: DISCONTINUED | OUTPATIENT
Start: 2023-03-29 | End: 2023-03-30 | Stop reason: HOSPADM

## 2023-03-28 RX ORDER — FUROSEMIDE 40 MG/1
40 TABLET ORAL DAILY
Status: DISCONTINUED | OUTPATIENT
Start: 2023-03-28 | End: 2023-03-30 | Stop reason: HOSPADM

## 2023-03-28 RX ORDER — HYDRALAZINE HYDROCHLORIDE 20 MG/ML
5 INJECTION INTRAMUSCULAR; INTRAVENOUS EVERY 6 HOURS PRN
Status: DISCONTINUED | OUTPATIENT
Start: 2023-03-28 | End: 2023-03-30 | Stop reason: HOSPADM

## 2023-03-28 RX ADMIN — PANTOPRAZOLE SODIUM 40 MG: 40 TABLET, DELAYED RELEASE ORAL at 08:14

## 2023-03-28 RX ADMIN — FUROSEMIDE 40 MG: 40 TABLET ORAL at 13:52

## 2023-03-28 RX ADMIN — METOPROLOL TARTRATE 12.5 MG: 25 TABLET, FILM COATED ORAL at 08:14

## 2023-03-28 RX ADMIN — LORAZEPAM 1 MG: 1 TABLET ORAL at 08:14

## 2023-03-28 RX ADMIN — SODIUM CHLORIDE: 9 INJECTION, SOLUTION INTRAVENOUS at 07:36

## 2023-03-28 RX ADMIN — LORAZEPAM 1 MG: 1 TABLET ORAL at 20:13

## 2023-03-28 RX ADMIN — HEPARIN SODIUM 5000 UNITS: 5000 INJECTION INTRAVENOUS; SUBCUTANEOUS at 20:16

## 2023-03-28 RX ADMIN — METOPROLOL TARTRATE 12.5 MG: 25 TABLET, FILM COATED ORAL at 20:13

## 2023-03-28 RX ADMIN — HYDRALAZINE HYDROCHLORIDE 5 MG: 20 INJECTION INTRAMUSCULAR; INTRAVENOUS at 18:24

## 2023-03-28 RX ADMIN — HEPARIN SODIUM 5000 UNITS: 5000 INJECTION INTRAVENOUS; SUBCUTANEOUS at 08:14

## 2023-03-28 RX ADMIN — CEFTRIAXONE SODIUM 1000 MG: 1 INJECTION, POWDER, FOR SOLUTION INTRAMUSCULAR; INTRAVENOUS at 15:38

## 2023-03-28 RX ADMIN — QUETIAPINE FUMARATE 25 MG: 25 TABLET ORAL at 20:13

## 2023-03-28 RX ADMIN — HYDROCODONE BITARTRATE AND ACETAMINOPHEN 1 TABLET: 5; 325 TABLET ORAL at 03:04

## 2023-03-28 RX ADMIN — TAMSULOSIN HYDROCHLORIDE 0.4 MG: 0.4 CAPSULE ORAL at 08:14

## 2023-03-28 ASSESSMENT — PAIN SCALES - GENERAL
PAINLEVEL_OUTOF10: 8
PAINLEVEL_OUTOF10: 8

## 2023-03-28 ASSESSMENT — PAIN DESCRIPTION - LOCATION: LOCATION: GENERALIZED

## 2023-03-28 ASSESSMENT — PAIN DESCRIPTION - FREQUENCY: FREQUENCY: CONTINUOUS

## 2023-03-28 ASSESSMENT — PAIN DESCRIPTION - ONSET: ONSET: ON-GOING

## 2023-03-28 ASSESSMENT — PAIN - FUNCTIONAL ASSESSMENT: PAIN_FUNCTIONAL_ASSESSMENT: PREVENTS OR INTERFERES SOME ACTIVE ACTIVITIES AND ADLS

## 2023-03-28 ASSESSMENT — PAIN DESCRIPTION - PAIN TYPE: TYPE: CHRONIC PAIN

## 2023-03-28 ASSESSMENT — PAIN DESCRIPTION - DESCRIPTORS: DESCRIPTORS: ACHING

## 2023-03-28 NOTE — CONSULTS
pillow support. Heels elevated with pillow support. Buttocks and heels checked and blanching. Sacral border added to buttock for protection. Bed in lowest position, Call light and bedside table within reach. Nurse Updated.       Specialty Bed Required : yes   [] Low Air Loss   [x] Pressure Redistribution  [] Fluid Immersion  [] Bariatric  [] Total Pressure Relief  [] Other:     Discharge Plan:  Placement for patient upon discharge: TBD  Hospice Care: No   Patient appropriate for Outpatient [address]: Yes     Patient/Caregiver Teaching:  Level of patient/caregiver understanding able to:  [ Y ] Indicates understanding        [ Y ] Needs reinforcement  [ ] Unsuccessful       [ ] Verbal Understanding  [ ] Demonstrated understanding        [ ] No evidence of learning  [ ] Refused teaching          [ ] N/A       Electronically signed by Wei Person RN,on 3/28/2023 at 10:39 AM
15 yo F presents with c/o left ankle pain since yesterday.  Pt came down on it wrong during cheerleading practice.  On exam pt in NAD AAO x 3, + swelling left ankle with tenderness to lateral malleolus, no fibula head tenderness, good ROM, skin intact

## 2023-03-29 PROCEDURE — 6370000000 HC RX 637 (ALT 250 FOR IP): Performed by: NURSE PRACTITIONER

## 2023-03-29 PROCEDURE — 92610 EVALUATE SWALLOWING FUNCTION: CPT

## 2023-03-29 PROCEDURE — 6360000002 HC RX W HCPCS: Performed by: NURSE PRACTITIONER

## 2023-03-29 PROCEDURE — 94761 N-INVAS EAR/PLS OXIMETRY MLT: CPT

## 2023-03-29 PROCEDURE — 2580000003 HC RX 258: Performed by: NURSE PRACTITIONER

## 2023-03-29 PROCEDURE — 1200000000 HC SEMI PRIVATE

## 2023-03-29 PROCEDURE — 6370000000 HC RX 637 (ALT 250 FOR IP): Performed by: INTERNAL MEDICINE

## 2023-03-29 PROCEDURE — 97535 SELF CARE MNGMENT TRAINING: CPT

## 2023-03-29 PROCEDURE — 97530 THERAPEUTIC ACTIVITIES: CPT

## 2023-03-29 PROCEDURE — 97110 THERAPEUTIC EXERCISES: CPT

## 2023-03-29 RX ADMIN — CEFTRIAXONE SODIUM 1000 MG: 1 INJECTION, POWDER, FOR SOLUTION INTRAMUSCULAR; INTRAVENOUS at 15:57

## 2023-03-29 RX ADMIN — LORAZEPAM 1 MG: 1 TABLET ORAL at 20:27

## 2023-03-29 RX ADMIN — HEPARIN SODIUM 5000 UNITS: 5000 INJECTION INTRAVENOUS; SUBCUTANEOUS at 09:30

## 2023-03-29 RX ADMIN — LISINOPRIL 2.5 MG: 5 TABLET ORAL at 09:29

## 2023-03-29 RX ADMIN — METOPROLOL TARTRATE 12.5 MG: 25 TABLET, FILM COATED ORAL at 20:27

## 2023-03-29 RX ADMIN — PANTOPRAZOLE SODIUM 40 MG: 40 TABLET, DELAYED RELEASE ORAL at 09:30

## 2023-03-29 RX ADMIN — QUETIAPINE FUMARATE 25 MG: 25 TABLET ORAL at 20:28

## 2023-03-29 RX ADMIN — METOPROLOL TARTRATE 12.5 MG: 25 TABLET, FILM COATED ORAL at 09:30

## 2023-03-29 RX ADMIN — SODIUM CHLORIDE, PRESERVATIVE FREE 10 ML: 5 INJECTION INTRAVENOUS at 09:30

## 2023-03-29 RX ADMIN — TAMSULOSIN HYDROCHLORIDE 0.4 MG: 0.4 CAPSULE ORAL at 09:30

## 2023-03-29 RX ADMIN — FUROSEMIDE 40 MG: 40 TABLET ORAL at 09:30

## 2023-03-29 RX ADMIN — HEPARIN SODIUM 5000 UNITS: 5000 INJECTION INTRAVENOUS; SUBCUTANEOUS at 20:28

## 2023-03-29 ASSESSMENT — ENCOUNTER SYMPTOMS
BLOOD IN STOOL: 0
SHORTNESS OF BREATH: 0
COUGH: 0
COLOR CHANGE: 1
SINUS PAIN: 0
CHEST TIGHTNESS: 0
EYE PAIN: 0
VOICE CHANGE: 0
ABDOMINAL PAIN: 0
DIARRHEA: 0
SINUS PRESSURE: 0
VOMITING: 0
BACK PAIN: 1
EYE DISCHARGE: 0
NAUSEA: 0

## 2023-03-29 ASSESSMENT — PAIN SCALES - GENERAL: PAINLEVEL_OUTOF10: 0

## 2023-03-29 NOTE — PLAN OF CARE
Problem: Discharge Planning  Goal: Discharge to home or other facility with appropriate resources  3/29/2023 1120 by Lyndsay Silva LPN  Outcome: Progressing  3/29/2023 1120 by Lyndsay Silva LPN  Outcome: Progressing     Problem: Pain  Goal: Verbalizes/displays adequate comfort level or baseline comfort level  3/29/2023 1120 by Lyndsay Silva LPN  Outcome: Progressing  3/29/2023 1120 by Lyndsay Silva LPN  Outcome: Progressing     Problem: Safety - Adult  Goal: Free from fall injury  3/29/2023 1120 by Lyndsay Silva LPN  Outcome: Progressing  3/29/2023 1120 by Lyndsay Silva LPN  Outcome: Progressing     Problem: ABCDS Injury Assessment  Goal: Absence of physical injury  3/29/2023 1120 by Lyndsay Silva LPN  Outcome: Progressing  3/29/2023 1120 by Lyndsay Silva LPN  Outcome: Progressing     Problem: Skin/Tissue Integrity  Goal: Absence of new skin breakdown  Description: 1. Monitor for areas of redness and/or skin breakdown  2. Assess vascular access sites hourly  3. Every 4-6 hours minimum:  Change oxygen saturation probe site  4. Every 4-6 hours:  If on nasal continuous positive airway pressure, respiratory therapy assess nares and determine need for appliance change or resting period.   3/29/2023 1120 by Lyndsay Silva LPN  Outcome: Progressing  3/29/2023 1120 by Lyndsay Silva LPN  Outcome: Progressing     Problem: Chronic Conditions and Co-morbidities  Goal: Patient's chronic conditions and co-morbidity symptoms are monitored and maintained or improved  3/29/2023 1120 by Lyndsay Silva LPN  Outcome: Progressing  3/29/2023 1120 by Lyndsay Silva LPN  Outcome: Progressing     Problem: Nutrition Deficit:  Goal: Optimize nutritional status  3/29/2023 1120 by Lyndsay Silva LPN  Outcome: Progressing  3/29/2023 1120 by Lyndsay Silva LPN  Outcome: Progressing

## 2023-03-30 VITALS
RESPIRATION RATE: 18 BRPM | HEIGHT: 62 IN | OXYGEN SATURATION: 95 % | WEIGHT: 89.07 LBS | BODY MASS INDEX: 16.39 KG/M2 | TEMPERATURE: 97.7 F | SYSTOLIC BLOOD PRESSURE: 138 MMHG | DIASTOLIC BLOOD PRESSURE: 85 MMHG | HEART RATE: 89 BPM

## 2023-03-30 PROBLEM — N39.0 UTI (URINARY TRACT INFECTION): Status: RESOLVED | Noted: 2023-03-27 | Resolved: 2023-03-30

## 2023-03-30 PROBLEM — N30.00 ACUTE CYSTITIS WITHOUT HEMATURIA: Status: RESOLVED | Noted: 2023-03-27 | Resolved: 2023-03-30

## 2023-03-30 PROCEDURE — 6370000000 HC RX 637 (ALT 250 FOR IP): Performed by: INTERNAL MEDICINE

## 2023-03-30 PROCEDURE — 2580000003 HC RX 258: Performed by: NURSE PRACTITIONER

## 2023-03-30 PROCEDURE — 6370000000 HC RX 637 (ALT 250 FOR IP): Performed by: NURSE PRACTITIONER

## 2023-03-30 PROCEDURE — 6360000002 HC RX W HCPCS: Performed by: NURSE PRACTITIONER

## 2023-03-30 RX ORDER — LISINOPRIL 2.5 MG/1
2.5 TABLET ORAL DAILY
Qty: 30 TABLET | Refills: 3 | Status: SHIPPED | OUTPATIENT
Start: 2023-03-31

## 2023-03-30 RX ORDER — NITROFURANTOIN 25; 75 MG/1; MG/1
100 CAPSULE ORAL 2 TIMES DAILY
Qty: 8 CAPSULE | Refills: 0 | Status: SHIPPED | OUTPATIENT
Start: 2023-03-30 | End: 2023-04-03

## 2023-03-30 RX ORDER — SULFAMETHOXAZOLE AND TRIMETHOPRIM 800; 160 MG/1; MG/1
1 TABLET ORAL 2 TIMES DAILY
Qty: 8 TABLET | Refills: 0 | Status: SHIPPED | OUTPATIENT
Start: 2023-03-30 | End: 2023-03-30 | Stop reason: HOSPADM

## 2023-03-30 RX ADMIN — SODIUM CHLORIDE, PRESERVATIVE FREE 10 ML: 5 INJECTION INTRAVENOUS at 09:47

## 2023-03-30 RX ADMIN — HYDROCODONE BITARTRATE AND ACETAMINOPHEN 1 TABLET: 5; 325 TABLET ORAL at 03:16

## 2023-03-30 RX ADMIN — HEPARIN SODIUM 5000 UNITS: 5000 INJECTION INTRAVENOUS; SUBCUTANEOUS at 09:47

## 2023-03-30 RX ADMIN — PANTOPRAZOLE SODIUM 40 MG: 40 TABLET, DELAYED RELEASE ORAL at 09:48

## 2023-03-30 RX ADMIN — FUROSEMIDE 40 MG: 40 TABLET ORAL at 09:48

## 2023-03-30 RX ADMIN — LISINOPRIL 2.5 MG: 5 TABLET ORAL at 09:48

## 2023-03-30 RX ADMIN — TAMSULOSIN HYDROCHLORIDE 0.4 MG: 0.4 CAPSULE ORAL at 09:48

## 2023-03-30 RX ADMIN — METOPROLOL TARTRATE 12.5 MG: 25 TABLET, FILM COATED ORAL at 09:48

## 2023-03-30 ASSESSMENT — PAIN SCALES - GENERAL: PAINLEVEL_OUTOF10: 7

## 2023-03-30 NOTE — DISCHARGE INSTR - DIET

## 2023-03-30 NOTE — PLAN OF CARE
Problem: Discharge Planning  Goal: Discharge to home or other facility with appropriate resources  Outcome: Completed  Flowsheets (Taken 3/30/2023 0945)  Discharge to home or other facility with appropriate resources: Identify barriers to discharge with patient and caregiver     Problem: Pain  Goal: Verbalizes/displays adequate comfort level or baseline comfort level  Outcome: Completed  Flowsheets (Taken 3/30/2023 0930)  Verbalizes/displays adequate comfort level or baseline comfort level: Encourage patient to monitor pain and request assistance     Problem: Safety - Adult  Goal: Free from fall injury  Outcome: Completed     Problem: ABCDS Injury Assessment  Goal: Absence of physical injury  Outcome: Completed     Problem: Skin/Tissue Integrity  Goal: Absence of new skin breakdown  Description: 1. Monitor for areas of redness and/or skin breakdown  2. Assess vascular access sites hourly  3. Every 4-6 hours minimum:  Change oxygen saturation probe site  4. Every 4-6 hours:  If on nasal continuous positive airway pressure, respiratory therapy assess nares and determine need for appliance change or resting period.   Outcome: Completed     Problem: Chronic Conditions and Co-morbidities  Goal: Patient's chronic conditions and co-morbidity symptoms are monitored and maintained or improved  Outcome: Completed  Flowsheets (Taken 3/30/2023 0945)  Care Plan - Patient's Chronic Conditions and Co-Morbidity Symptoms are Monitored and Maintained or Improved: Monitor and assess patient's chronic conditions and comorbid symptoms for stability, deterioration, or improvement     Problem: Nutrition Deficit:  Goal: Optimize nutritional status  Outcome: Completed

## 2023-03-30 NOTE — PROGRESS NOTES
4 Eyes Skin Assessment     NAME:  Ruchi Hannah OF BIRTH:  5/14/1928  MEDICAL RECORD NUMBER:  [id number]    The patient is being assessed for  Admission    I agree that One RN has performed a thorough Head to Toe Skin Assessment on the patient. ALL assessment sites listed below have been assessed. Areas assessed by both nurses:    Head, Face, Ears, Shoulders, Back, Chest, Arms, Elbows, Hands, Sacrum. Buttock, Coccyx, Ischium, and Legs. Feet and Heels        Does the Patient have a Wound? Yes wound(s) were present on assessment.  LDA wound assessment was Initiated and completed by RN       [place]ark Prevention initiated by RN: Yes   Wound Care Orders initiated by RN: Yes    Pressure Injury (Stage 3,4, Unstageable, DTI, NWPT, and Complex wounds) if present, place referral order by RN under : No    New and Established Ostomies, if present place, referral order under : No      Nurse 1 eSignature: Electronically signed by Mario Obrien RN on 3/27/23 at 4:51 PM EDT    **SHARE this note so that the co-signing nurse can place an eSignature**    Nurse 2 eSignature: Electronically signed by Lucila Haji RN on 3/27/23 at 5:27 PM EDT
Occupational Therapy      Occupational Therapy Treatment Note    Name: Liliana Costa MRN: [id number] :   1928   Date:  3/29/2023   Admission Date: 3/27/2023 Room:  [address] [place] [address]-A     Primary Problem:  UTI    Restrictions/Precautions:          General Precautions, Fall Risk, AMALIA    Communication with other providers: Nursing handoff    Subjective:  Patient states:  \"I want to brush my teeth\"  Pain:   Location, Type, Intensity (0/10 to 10/10):  No    Objective:    Observation: Pt received sitting upright in chair, agreeable to therapy   Objective Measures:  WFL    Treatment, including education:  Therapeutic Activity Training:   Therapeutic activity training was instructed today. Cues were given for safety, sequence, UE/LE placement, awareness, and balance. Activities performed today included STS, functional mobility, stand to sit    Self Care Training:   Cues were given for safety, sequence, UE/LE placement, visual cues, and balance. Activities performed today included grooming    STS from reclining chair up to RW CGA, functional moiblity in room w/ RW ~7 feet CGA, in stand at sink ~4 minutes CGA for oral care Setup/CGA, stand to sit from sink to reclining chair CGA. STS from reclining chair up to RW CGA, stand to sit CGA.     Pt sitting upright in chair, AMALIA on, call light at side, chair alarm on, nursing notified     Assessment / Impression:    Patient's tolerance of treatment: Well  Adverse Reaction: None  Significant change in status and impact: Improved from initial evaluation  Barriers to improvement: None noted      Plan for Next Session:    Continue OT POC    Time in:  0950  Time out:  1007  Timed treatment minutes:  17 minutes  Total treatment time:  17 minutes      Electronically signed by:    Rashid ONEAL/L [id number]  10:27 AM,3/29/2023
Outpatient Pharmacy Progress Note for Meds-to-Beds    Total number of Prescriptions Filled: 2  The following medications were dispensed to the patient during the discharge process:  lisinopril  nitrofurantoin    Additional Documentation:  Patient picked-up the medication(s) in the OP Pharmacy      Thank you for letting us serve your patients.   [address]    [address], [address] [facility]    Phone: [phone]    Fax: [phone]
Physical Therapy  [facility] ACUTE CARE PHYSICAL THERAPY EVALUATION  Katalina Jerry, 5/14/1928, 6088/[id number]-F, 3/28/2023    History  [place]:  The primary encounter diagnosis was Generalized weakness. Diagnoses of Acute lower urinary tract infection, Hyperkalemia, and Essential hypertension were also pertinent to this visit. Patient  has a past medical history of Acid reflux, Arthritis, CAD (coronary artery disease), Cancer ([address] CHF (congestive heart failure) ([address] DDD (degenerative disc disease), cervical, Dementia ([address] Full dentures, Gallstone, [place] (hard of hearing), Hx of blood clots, Hyperlipidemia, Hypertension, PONV (postoperative nausea and vomiting), UTI (urinary tract infection), and Wears glasses. Patient  has a past surgical history that includes Kidney surgery; Coronary artery bypass graft (10+ yrs ago); bladder suspension (?when); other surgical history; Cardiac catheterization; and other surgical history (Right, 09/19/2017). Subjective:  Patient states:  \"I try to be nice to everyone\"   Pain:  legs and feet sore, did not rate   Communication with other providers:   co-eval with Michi REILLY + Student Linda REILLY   Restrictions: general precautions, falls, AMALIA     Home Setup/Prior level of function  Social/Functional History  Lives With: Son (+DIL)  Type of Home: House  Home Layout: One level  Home Access: Stairs to enter with rails  Entrance Stairs - Number of Steps: 2  Bathroom Shower/Tub: Tub/Shower unit  Bathroom Toilet: Standard  Bathroom Equipment: Shower chair  Home Equipment: Lift chair, Walker, rolling  ADL Assistance: Needs assistance (Pt able to manage toileting and dressing tasks on her own.  Pts DIL supervises/provies set up assist for bathing)  Ambulation Assistance: Independent (Blaze with RW)  Transfer Assistance: Independent  Active : No  Patient's  Info: son    Examination of body systems (includes body structures/functions, activity/participation
Physician Progress Note      Ml Amaro  CSN #:                  [id number]  :                       1928  ADMIT DATE:       3/27/2023 9:59 AM  DISCH DATE:  RESPONDING  PROVIDER #:        Lew Noriega MD          QUERY TEXT:    Patient admitted with acute cystitis. Per dietitian consult on 3/28, pt meets   AND/ASPEN criteria for severe malnutrition. If possible, please document in   progress notes and discharge summary if you are evaluating and /or treating   any of the following: The medical record reflects the following:  Risk Factors: dementia, poor appetite  Clinical Indicators: Ht 5'2\", wt 89 lbs, BMI 16.3. Dietitian consult: Severe   malnutrition, In context of chronic illness related to early satiety,   cognitive or neurological impairment, acute injury/trauma as evidenced by   severe loss of subcutaneous fat, severe muscle loss. Treatment: dietitian consult, oral nutritional supplement, weights, I&O's    JOSE ThaoN, RN, [facility]  Clinical   [phone]  Options provided:  -- Protein calorie malnutrition severe  -- Other - I will add my own diagnosis  -- Disagree - Not applicable / Not valid  -- Disagree - Clinically unable to determine / Unknown  -- Refer to Clinical Documentation Reviewer    PROVIDER RESPONSE TEXT:    This patient has severe protein calorie malnutrition.     Query created by: Malik Dennison on 3/29/2023 2:34 PM      Electronically signed by:  Lew Noriega MD 3/30/2023 8:36 AM
Pt arrived from [facility] ER via [facility].  Registration notified of patient arrival
0 - No pain  Pain Location: Generalized  Pain Orientation: Right, Left  Pain Descriptors: Aching  Functional Pain Assessment: Prevents or interferes some active activities and ADLs  Pain Type: Chronic pain  Pain Frequency: Continuous  Pain Onset: On-going  Non-Pharmaceutical Pain Intervention(s): Repositioned, Rest    Reason for Referral  Ruchi Nunez was referred for a bedside swallow evaluation to assess the efficiency of her swallow function, identify signs and symptoms of aspiration and make recommendations regarding safe dietary consistencies, effective compensatory strategies, and safe eating environment. Impression  Dysphagia Diagnosis: Swallow function appears WFL;No clinical indicators of dysphagia  Dysphagia Outcome Severity Scale: Level 5: Mild dysphagia- Distant supervision. May need one diet consistency restricted     Treatment Plan  Requires SLP Intervention: Yes  Duration of Treatment: length of stay          Recommended Diet and Intervention                   Compensatory Swallowing Strategies       Treatment/Goals  Short-term Goals  Timeframe for Short-term Goals: Length of stay  Goal 1: Pt will tolerate soft and bite-sized and nectar thick liquids with no s/s of aspiration  Goal 2: Pt/caregivers will demonstrate understanding of recommendations/POC    General  Chart Reviewed: Yes  Behavior/Cognition: Alert; Cooperative  Communication Observation: Functional  Follows Directions: Simple  Dentition: Dentures top;Dentures bottom  Patient Positioning: Upright in chair  Baseline Vocal Quality: Normal  Consistencies Administered: Regular;Pureed;Thin;Mildly Thick - straw           Vision/Hearing  Vision  Vision: Within Functional Limits  Hearing  Hearing: Exceptions to [facility]  Hearing Exceptions: Hard of hearing/hearing concerns    Oral Motor Deficits  Oral/Motor  Oral Hygiene: Moist    Oral Phase Dysfunction  Oral Phase  Oral Phase: WFL     Indicators of Pharyngeal Phase Dysfunction   Pharyngeal Phase
Shower/Tub: Tub/Shower unit  Bathroom Toilet: Standard  Bathroom Equipment: Shower chair  Home Equipment: Lift chair, Walker, rolling  ADL Assistance: Needs assistance (Pt able to manage toileting and dressing tasks on her own.  Pts DIL supervises/provies set up assist for bathing)  Ambulation Assistance: Independent (Blaze with RW)  Transfer Assistance: Independent  Active : No  Patient's  Info: son  Short Term Goals  Time Frame for Short Term Goals: 2 weeks  Short Term Goal 1: Pt will perform sit><supine Blaze  Short Term Goal 2: Pt will transfer between surfaces Blaze  Short Term Goal 3: Pt will ambulate 100ft with RW SBA  Short Term Goal 4: Pt will ascend/descend 2 steps, single rail CGA  Short Term Goal 5: Pt will perform standing light dynamic activity without UE support SBA x 3 minutes     Electronically signed by:    Domonique Steele PTA PTA  3/29/2023, 2:05 PM
None Identified  Food/Nutrient Intake Outcomes: Supplement Intake, Food and Nutrient Intake, Diet Advancement/Tolerance, IVF Intake  Physical Signs/Symptoms Outcomes: Biochemical Data, GI Status, Hemodynamic Status, Meal Time Behavior, Weight, Skin, Nutrition Focused Physical Findings    Discharge Planning:     Too soon to determine     Naina Boone RD, LD  Contact: [phone]
Vitals:   Vitals:    03/29/23 0915   BP: (!) 197/91   Pulse: 85   Resp: 17   Temp: 97.5 °F (36.4 °C)   SpO2: 99%       Physical Exam:   Physical Exam  Vitals reviewed. Constitutional:       Appearance: Normal appearance. She is normal weight. She is ill-appearing. HENT:      Head: Normocephalic. Right Ear: Tympanic membrane normal.      Left Ear: Tympanic membrane normal.      Nose: Nose normal.      Mouth/Throat:      Mouth: Mucous membranes are moist.   Eyes:      Conjunctiva/sclera: Conjunctivae normal.      Pupils: Pupils are equal, round, and reactive to light. Cardiovascular:      Rate and Rhythm: Normal rate and regular rhythm. Pulses: Normal pulses. Heart sounds: Normal heart sounds. No murmur heard. Pulmonary:      Effort: Pulmonary effort is normal.      Breath sounds: Normal breath sounds. No wheezing, rhonchi or rales. Abdominal:      General: Abdomen is flat. Bowel sounds are normal. There is no distension. Palpations: Abdomen is soft. Tenderness: There is no abdominal tenderness. Musculoskeletal:         General: No deformity. Normal range of motion. Cervical back: Normal range of motion and neck supple. Right lower leg: No edema. Left lower leg: No edema. Skin:     Coloration: Skin is not jaundiced or pale. Neurological:      General: No focal deficit present. Mental Status: She is alert and oriented to person, place, and time. Mental status is at baseline. Motor: Weakness present.    Psychiatric:         Mood and Affect: Mood normal.         Behavior: Behavior normal.          Medications:   Medications:    furosemide  40 mg Oral Daily    lisinopril  2.5 mg Oral Daily    tamsulosin  0.4 mg Oral Daily    metoprolol tartrate  12.5 mg Oral BID    pantoprazole  40 mg Oral Daily    sodium chloride flush  5-40 mL IntraVENous 2 times per day    heparin (porcine)  5,000 Units SubCUTAneous BID    cefTRIAXone (ROCEPHIN) IV  1,000 mg
w/ initial set up with no seated rest breaks   8.  Pt will complete ther ex/ther act with focus on UE strengthening and functional activity tolerance in standing >5 min            Time:   Time in: 0903  Time out: 0921  Timed treatment minutes: 8  Total time: 18      Electronically signed by:    Tyrone Edge S/OT     Leslee Ordoñez OTR/L, [address] [place] [id number]
<300 PG/ML   EKG 12 Lead    Collection Time: 03/27/23 10:32 AM   Result Value Ref Range    Ventricular Rate 70 BPM    Atrial Rate 70 BPM    P-R Interval 138 ms    QRS Duration 80 ms    Q-T Interval 424 ms    QTc Calculation (Bazett) 457 ms    P Axis 51 degrees    R Axis 32 degrees    T Axis 52 degrees    Diagnosis       Normal sinus rhythm  Possible Left atrial enlargement  Anterior infarct , age undetermined  Abnormal ECG  When compared with ECG of 29-NOV-2022 09:48,  No significant change was found  Confirmed by Veneda Cea ([id number]) on 3/27/2023 5:27:58 PM     COVID-19, Rapid    Collection Time: 03/27/23 10:41 AM    Specimen: Nasopharyngeal   Result Value Ref Range    Source NARES     SARS-CoV-2, NAAT NOT DETECTED NOT DETECTED   Rapid Flu Swab    Collection Time: 03/27/23 10:41 AM    Specimen: Nasopharyngeal   Result Value Ref Range    Rapid Influenza A Ag NEGATIVE NEGATIVE    Rapid Influenza B Ag NEGATIVE NEGATIVE   Troponin    Collection Time: 03/27/23 12:50 PM   Result Value Ref Range    Troponin T <0.010 <0.01 NG/ML   CBC auto differential    Collection Time: 03/28/23  2:10 AM   Result Value Ref Range    WBC 6.8 4.0 - 10.5 K/CU MM    RBC 4.02 (L) 4.2 - 5.4 M/CU MM    Hemoglobin 11.9 (L) 12.5 - 16.0 GM/DL    Hematocrit 38.3 37 - 47 %    MCV 95.3 78 - 100 FL    MCH 29.6 27 - 31 PG    MCHC 31.1 (L) 32.0 - 36.0 %    RDW 13.5 11.7 - 14.9 %    Platelets 133 346 - 843 K/CU MM    MPV 10.0 7.5 - 11.1 FL    Differential Type AUTOMATED DIFFERENTIAL     Segs Relative 60.1 36 - 66 %    Lymphocytes % 27.9 24 - 44 %    Monocytes % 7.5 (H) 0 - 4 %    Eosinophils % 3.2 (H) 0 - 3 %    Basophils % 0.9 0 - 1 %    Segs Absolute 4.1 K/CU MM    Lymphocytes Absolute 1.9 K/CU MM    Monocytes Absolute 0.5 K/CU MM    Eosinophils Absolute 0.2 K/CU MM    Basophils Absolute 0.1 K/CU MM    Nucleated RBC % 0.0 %    Total Nucleated RBC 0.0 K/CU MM    Total Immature Neutrophil 0.03 K/CU MM    Immature Neutrophil % 0.4 0 - 0.43 %   Comprehensive

## 2023-03-30 NOTE — DISCHARGE SUMMARY
V2.0  Discharge Summary    Name:  Danii Horne /Age/Sex: 1928 (80 y.o. female)   Admit Date: 3/27/2023  Discharge Date: 3/30/23    MRN & CSN:  [id number] & [id number] Encounter Date and Time 3/30/23 9:55 AM EDT    Attending:  Deb Wright MD Discharging Provider: Deb Wright MD       Hospital Course:     Brief HPI: Danii Horne is a 80 y.o. female with pmh of HFpEF, HTN, CKD stage 3a, dementia and anxiety who presents with UTI (urinary tract infection) Son reports worsening confusion and urinary frequency x2 days concerning for UTI. Plan:     Acute cystitis with hematuria, resolved  Recurrent reported UTI's   Son reports worsening confusion and urinary frequency x2 days concerning for UTI.    Last culture pan sensitive E. coli   3/27: Urinalysis + trace blood, small leukocyte esterase, + nitrate, + bacteria, protein  3/27: Urine culture NGTD  3/27: Ceftriaxone 1 gram x 3 days , now macrobid (allergic to sulfa) for 4 days to complete 7 days due to recurrent UTIs  Continue home Flomax     HFpEF/VHD  Does not appear hypervolemic  3/27: BNP slightly elevated 2322  2015 Last TTE EF 55%, severe TR, severely elevated pulmonary arterial systolic pressure, LVH  Received gentle hydration overnight. Tolerating oral fluids well, will discontinue   Resume home Lasix 40 mg daily      Chronic Kidney disease, stage 3a sCr on admission 1.2 and appears at baseline. Gentle hydration overnight, monitor I&O's, daily weight  Serum creatinine 1.1 this am, discontinue IVF's  Avoid nephrotoxic agents and renally dose medications  Resume home Lasix      Hypertension  Monitor trend, elevated. Resume home Lasix.    Continue metoprolol     Severe osteoarthritis  Continue long-term Norco.    OARRS report verified     Dementia/generalized anxiety disorder  Continue Seroquel and PRN Ativan     Right lateral ankle skin ulcer: Present on admission  Evaluated at [facility] 2023 for ulcer   Wound care consulted for

## 2023-04-01 LAB
CULTURE: NORMAL
CULTURE: NORMAL
Lab: NORMAL
Lab: NORMAL
SPECIMEN: NORMAL
SPECIMEN: NORMAL

## 2023-05-15 ENCOUNTER — APPOINTMENT (OUTPATIENT)
Dept: CT IMAGING | Age: 88
DRG: 871 | End: 2023-05-15
Payer: MEDICARE

## 2023-05-15 ENCOUNTER — APPOINTMENT (OUTPATIENT)
Dept: GENERAL RADIOLOGY | Age: 88
DRG: 871 | End: 2023-05-15
Payer: MEDICARE

## 2023-05-15 ENCOUNTER — HOSPITAL ENCOUNTER (INPATIENT)
Age: 88
LOS: 1 days | Discharge: HOME HEALTH | DRG: 871 | End: 2023-05-17
Attending: INTERNAL MEDICINE | Admitting: INTERNAL MEDICINE
Payer: MEDICARE

## 2023-05-15 DIAGNOSIS — K44.9 HIATAL HERNIA: ICD-10-CM

## 2023-05-15 DIAGNOSIS — I26.99 PULMONARY EMBOLISM, UNSPECIFIED CHRONICITY, UNSPECIFIED PULMONARY EMBOLISM TYPE, UNSPECIFIED WHETHER ACUTE COR PULMONALE PRESENT (HCC): ICD-10-CM

## 2023-05-15 DIAGNOSIS — N30.80 EMPHYSEMATOUS CYSTITIS: Primary | ICD-10-CM

## 2023-05-15 LAB
ALBUMIN SERPL-MCNC: 3.7 GM/DL (ref 3.4–5)
ALP BLD-CCNC: 79 IU/L (ref 40–129)
ALT SERPL-CCNC: 8 U/L (ref 10–40)
ANION GAP SERPL CALCULATED.3IONS-SCNC: 16 MMOL/L (ref 4–16)
AST SERPL-CCNC: 20 IU/L (ref 15–37)
BASOPHILS ABSOLUTE: 0 K/CU MM
BASOPHILS RELATIVE PERCENT: 0.3 % (ref 0–1)
BILIRUB SERPL-MCNC: 0.3 MG/DL (ref 0–1)
BUN SERPL-MCNC: 32 MG/DL (ref 6–23)
CALCIUM SERPL-MCNC: 9.1 MG/DL (ref 8.3–10.6)
CHLORIDE BLD-SCNC: 101 MMOL/L (ref 99–110)
CO2: 22 MMOL/L (ref 21–32)
CREAT SERPL-MCNC: 1.4 MG/DL (ref 0.6–1.1)
DIFFERENTIAL TYPE: ABNORMAL
EOSINOPHILS ABSOLUTE: 0 K/CU MM
EOSINOPHILS RELATIVE PERCENT: 0.1 % (ref 0–3)
GFR SERPL CREATININE-BSD FRML MDRD: 35 ML/MIN/1.73M2
GLUCOSE SERPL-MCNC: 126 MG/DL (ref 70–99)
HCT VFR BLD CALC: 48.4 % (ref 37–47)
HEMOGLOBIN: 15.9 GM/DL (ref 12.5–16)
IMMATURE NEUTROPHIL %: 0.5 % (ref 0–0.43)
LACTATE: 1.4 MMOL/L (ref 0.5–1.9)
LIPASE: 43 IU/L (ref 13–60)
LYMPHOCYTES ABSOLUTE: 2 K/CU MM
LYMPHOCYTES RELATIVE PERCENT: 14.2 % (ref 24–44)
MCH RBC QN AUTO: 30.5 PG (ref 27–31)
MCHC RBC AUTO-ENTMCNC: 32.9 % (ref 32–36)
MCV RBC AUTO: 92.7 FL (ref 78–100)
MONOCYTES ABSOLUTE: 0.2 K/CU MM
MONOCYTES RELATIVE PERCENT: 1.5 % (ref 0–4)
NUCLEATED RBC %: 0 %
PDW BLD-RTO: 14.3 % (ref 11.7–14.9)
PLATELET # BLD: 221 K/CU MM (ref 140–440)
PMV BLD AUTO: 10.1 FL (ref 7.5–11.1)
POTASSIUM SERPL-SCNC: 3.8 MMOL/L (ref 3.5–5.1)
RBC # BLD: 5.22 M/CU MM (ref 4.2–5.4)
SEGMENTED NEUTROPHILS ABSOLUTE COUNT: 11.4 K/CU MM
SEGMENTED NEUTROPHILS RELATIVE PERCENT: 83.4 % (ref 36–66)
SODIUM BLD-SCNC: 139 MMOL/L (ref 135–145)
TOTAL IMMATURE NEUTOROPHIL: 0.07 K/CU MM
TOTAL NUCLEATED RBC: 0 K/CU MM
TOTAL PROTEIN: 7.6 GM/DL (ref 6.4–8.2)
TROPONIN T: <0.01 NG/ML
WBC # BLD: 13.7 K/CU MM (ref 4–10.5)

## 2023-05-15 PROCEDURE — 80053 COMPREHEN METABOLIC PANEL: CPT

## 2023-05-15 PROCEDURE — 85025 COMPLETE CBC W/AUTO DIFF WBC: CPT

## 2023-05-15 PROCEDURE — 83690 ASSAY OF LIPASE: CPT

## 2023-05-15 PROCEDURE — 84484 ASSAY OF TROPONIN QUANT: CPT

## 2023-05-15 PROCEDURE — 6360000004 HC RX CONTRAST MEDICATION

## 2023-05-15 PROCEDURE — 6360000002 HC RX W HCPCS: Performed by: PHYSICIAN ASSISTANT

## 2023-05-15 PROCEDURE — 93005 ELECTROCARDIOGRAM TRACING: CPT | Performed by: PHYSICIAN ASSISTANT

## 2023-05-15 PROCEDURE — 96365 THER/PROPH/DIAG IV INF INIT: CPT

## 2023-05-15 PROCEDURE — 96375 TX/PRO/DX INJ NEW DRUG ADDON: CPT

## 2023-05-15 PROCEDURE — 74177 CT ABD & PELVIS W/CONTRAST: CPT

## 2023-05-15 PROCEDURE — 71275 CT ANGIOGRAPHY CHEST: CPT

## 2023-05-15 PROCEDURE — 71045 X-RAY EXAM CHEST 1 VIEW: CPT

## 2023-05-15 PROCEDURE — 51701 INSERT BLADDER CATHETER: CPT

## 2023-05-15 PROCEDURE — 99285 EMERGENCY DEPT VISIT HI MDM: CPT

## 2023-05-15 PROCEDURE — 2580000003 HC RX 258: Performed by: PHYSICIAN ASSISTANT

## 2023-05-15 PROCEDURE — 96376 TX/PRO/DX INJ SAME DRUG ADON: CPT

## 2023-05-15 PROCEDURE — 83605 ASSAY OF LACTIC ACID: CPT

## 2023-05-15 PROCEDURE — 6360000004 HC RX CONTRAST MEDICATION: Performed by: PHYSICIAN ASSISTANT

## 2023-05-15 RX ORDER — ONDANSETRON 2 MG/ML
4 INJECTION INTRAMUSCULAR; INTRAVENOUS ONCE
Status: COMPLETED | OUTPATIENT
Start: 2023-05-15 | End: 2023-05-15

## 2023-05-15 RX ORDER — 0.9 % SODIUM CHLORIDE 0.9 %
1000 INTRAVENOUS SOLUTION INTRAVENOUS ONCE
Status: COMPLETED | OUTPATIENT
Start: 2023-05-15 | End: 2023-05-15

## 2023-05-15 RX ADMIN — CEFTRIAXONE SODIUM 1000 MG: 1 INJECTION, POWDER, FOR SOLUTION INTRAMUSCULAR; INTRAVENOUS at 22:17

## 2023-05-15 RX ADMIN — IOPAMIDOL 45 ML: 755 INJECTION, SOLUTION INTRAVENOUS at 23:03

## 2023-05-15 RX ADMIN — IOPAMIDOL 75 ML: 755 INJECTION, SOLUTION INTRAVENOUS at 20:42

## 2023-05-15 RX ADMIN — ONDANSETRON 4 MG: 2 INJECTION INTRAMUSCULAR; INTRAVENOUS at 20:15

## 2023-05-15 RX ADMIN — SODIUM CHLORIDE 1000 ML: 9 INJECTION, SOLUTION INTRAVENOUS at 19:03

## 2023-05-15 RX ADMIN — ONDANSETRON 4 MG: 2 INJECTION INTRAMUSCULAR; INTRAVENOUS at 19:01

## 2023-05-15 NOTE — ED PROVIDER NOTES
Emergency 3130  [facility] EMERGENCY DEPARTMENT    Patient: Tatum Rhoades  MRN: [id number]  : 1928  Date of Evaluation: 5/15/2023  ED Provider: Preston Wharton PA-C    Chief Complaint       Chief Complaint   Patient presents with    Emesis     X 3-4 hours, ate lunch and has been vomiting since, family states she has a hx of gallbladder issues in the past       Mele Luque is a 80 y.o. female who presents to the emergency department for vomiting. Onset was just a few hours ago. Per EMS, onset was after eating lunch but patient tells me she didn't have lunch today. She denies any abdominal pain. Denies diarrhea. Denies any known fever. Also per EMS, patient's family said she has had gallbladder issues in the past.        ROS     CONSTITUTIONAL:  Denies fever. EYES:  Denies visual changes. HEAD:  Denies headache. ENT:  Denies earache, nasal congestion, sore throat. NECK:  Denies neck pain. RESPIRATORY:  Denies any shortness of breath. CARDIOVASCULAR:  Denies chest pain. GI:  + vomiting. :  Denies urinary symptoms. MUSCULOSKELETAL:  Denies extremity pain or swelling. BACK:  Denies back pain. INTEGUMENT:  Denies skin changes. LYMPHATIC:  Denies lymphadenopathy. NEUROLOGIC:  Denies any numbness/tingling.     Past History     Past Medical History:   Diagnosis Date    Acid reflux     Arthritis     CAD (coronary artery disease)     does not follow with cardiologist at this time    Cancer [facility])     skin cancer on her arm    CHF (congestive heart failure) [address]     DDD (degenerative disc disease), cervical     \"uses walker\"    Dementia ([facility]     Full dentures     Gallstone     \"they will not due surgery on this\"    [place] (hard of hearing)     \"right ear- no hearing aide\"    Hx of blood clots     \"had blood clot both legs few yrs ago\"\"use to be on Coumadin for this- off this now    Hyperlipidemia     Hypertension     PONV (postoperative

## 2023-05-15 NOTE — ED PROVIDER NOTES
12 lead EKG per my interpretation:  Normal Sinus Rhythm at 87  Ray is   Normal  QTc is  within an acceptable range  There is no specific T wave changes appreciated. There is no specific ST wave changes appreciated. No STEMI  Significant baseline artifact from shivering. Prior EKG to compare with was available and normal sinus rhythm was seen on prior from 3/27/2023.     MD Leno Castro MD  05/15/23 0350

## 2023-05-16 ENCOUNTER — APPOINTMENT (OUTPATIENT)
Dept: ULTRASOUND IMAGING | Age: 88
DRG: 871 | End: 2023-05-16
Payer: MEDICARE

## 2023-05-16 PROBLEM — I26.99 PULMONARY EMBOLISM ON RIGHT (HCC): Status: ACTIVE | Noted: 2023-05-16

## 2023-05-16 LAB
APTT: 169.5 SECONDS (ref 25.1–37.1)
APTT: 22.8 SECONDS (ref 25.1–37.1)
APTT: 60.5 SECONDS (ref 25.1–37.1)
APTT: 90.4 SECONDS (ref 25.1–37.1)
BACTERIA: NEGATIVE /HPF
BILIRUBIN URINE: NEGATIVE MG/DL
BLOOD, URINE: ABNORMAL
CLARITY: CLEAR
COLOR: YELLOW
D DIMER: 481 NG/ML(DDU)
GLUCOSE, URINE: NEGATIVE MG/DL
KETONES, URINE: NEGATIVE MG/DL
LEUKOCYTE ESTERASE, URINE: ABNORMAL
LV EF: 55 %
LVEF MODALITY: NORMAL
NITRITE URINE, QUANTITATIVE: POSITIVE
PH, URINE: 5 (ref 5–8)
PROTEIN UA: NEGATIVE MG/DL
RBC URINE: 3 /HPF (ref 0–6)
SPECIFIC GRAVITY UA: <1.005 (ref 1–1.03)
TRICHOMONAS: ABNORMAL /HPF
UROBILINOGEN, URINE: 0.2 MG/DL (ref 0.2–1)
WBC CLUMP: ABNORMAL /HPF
WBC UA: 32 /HPF (ref 0–5)

## 2023-05-16 PROCEDURE — 2500000003 HC RX 250 WO HCPCS: Performed by: INTERNAL MEDICINE

## 2023-05-16 PROCEDURE — 87086 URINE CULTURE/COLONY COUNT: CPT

## 2023-05-16 PROCEDURE — 2700000000 HC OXYGEN THERAPY PER DAY

## 2023-05-16 PROCEDURE — 1200000000 HC SEMI PRIVATE

## 2023-05-16 PROCEDURE — 85379 FIBRIN DEGRADATION QUANT: CPT

## 2023-05-16 PROCEDURE — 87186 SC STD MICRODIL/AGAR DIL: CPT

## 2023-05-16 PROCEDURE — 93306 TTE W/DOPPLER COMPLETE: CPT

## 2023-05-16 PROCEDURE — 6360000002 HC RX W HCPCS: Performed by: PHYSICIAN ASSISTANT

## 2023-05-16 PROCEDURE — 99211 OFF/OP EST MAY X REQ PHY/QHP: CPT

## 2023-05-16 PROCEDURE — 6360000002 HC RX W HCPCS: Performed by: INTERNAL MEDICINE

## 2023-05-16 PROCEDURE — 94761 N-INVAS EAR/PLS OXIMETRY MLT: CPT

## 2023-05-16 PROCEDURE — 87077 CULTURE AEROBIC IDENTIFY: CPT

## 2023-05-16 PROCEDURE — 2580000003 HC RX 258: Performed by: INTERNAL MEDICINE

## 2023-05-16 PROCEDURE — 81001 URINALYSIS AUTO W/SCOPE: CPT

## 2023-05-16 PROCEDURE — 93970 EXTREMITY STUDY: CPT

## 2023-05-16 PROCEDURE — 6370000000 HC RX 637 (ALT 250 FOR IP): Performed by: INTERNAL MEDICINE

## 2023-05-16 PROCEDURE — 85730 THROMBOPLASTIN TIME PARTIAL: CPT

## 2023-05-16 PROCEDURE — 99222 1ST HOSP IP/OBS MODERATE 55: CPT | Performed by: INTERNAL MEDICINE

## 2023-05-16 PROCEDURE — C8923 2D TTE W OR W/O FOL W/CON,CO: HCPCS

## 2023-05-16 PROCEDURE — 6370000000 HC RX 637 (ALT 250 FOR IP): Performed by: STUDENT IN AN ORGANIZED HEALTH CARE EDUCATION/TRAINING PROGRAM

## 2023-05-16 PROCEDURE — 36415 COLL VENOUS BLD VENIPUNCTURE: CPT

## 2023-05-16 RX ORDER — ONDANSETRON 4 MG/1
4 TABLET, ORALLY DISINTEGRATING ORAL EVERY 8 HOURS PRN
Status: DISCONTINUED | OUTPATIENT
Start: 2023-05-16 | End: 2023-05-17 | Stop reason: HOSPADM

## 2023-05-16 RX ORDER — FERROUS SULFATE 325(65) MG
325 TABLET ORAL
Status: DISCONTINUED | OUTPATIENT
Start: 2023-05-16 | End: 2023-05-17 | Stop reason: HOSPADM

## 2023-05-16 RX ORDER — QUETIAPINE FUMARATE 25 MG/1
25 TABLET, FILM COATED ORAL NIGHTLY
Status: DISCONTINUED | OUTPATIENT
Start: 2023-05-16 | End: 2023-05-17 | Stop reason: HOSPADM

## 2023-05-16 RX ORDER — LORAZEPAM 1 MG/1
1 TABLET ORAL EVERY 12 HOURS PRN
Status: DISCONTINUED | OUTPATIENT
Start: 2023-05-16 | End: 2023-05-17 | Stop reason: HOSPADM

## 2023-05-16 RX ORDER — HEPARIN SODIUM 1000 [USP'U]/ML
80 INJECTION, SOLUTION INTRAVENOUS; SUBCUTANEOUS ONCE
Status: COMPLETED | OUTPATIENT
Start: 2023-05-16 | End: 2023-05-16

## 2023-05-16 RX ORDER — SODIUM CHLORIDE 0.9 % (FLUSH) 0.9 %
5-40 SYRINGE (ML) INJECTION EVERY 12 HOURS SCHEDULED
Status: DISCONTINUED | OUTPATIENT
Start: 2023-05-16 | End: 2023-05-17 | Stop reason: HOSPADM

## 2023-05-16 RX ORDER — ONDANSETRON 2 MG/ML
4 INJECTION INTRAMUSCULAR; INTRAVENOUS EVERY 6 HOURS PRN
Status: DISCONTINUED | OUTPATIENT
Start: 2023-05-16 | End: 2023-05-17 | Stop reason: HOSPADM

## 2023-05-16 RX ORDER — LISINOPRIL 5 MG/1
2.5 TABLET ORAL DAILY
Status: DISCONTINUED | OUTPATIENT
Start: 2023-05-16 | End: 2023-05-17 | Stop reason: HOSPADM

## 2023-05-16 RX ORDER — HYDROCODONE BITARTRATE AND ACETAMINOPHEN 5; 325 MG/1; MG/1
1 TABLET ORAL EVERY 6 HOURS PRN
Status: DISCONTINUED | OUTPATIENT
Start: 2023-05-16 | End: 2023-05-17 | Stop reason: HOSPADM

## 2023-05-16 RX ORDER — SODIUM CHLORIDE 9 MG/ML
INJECTION, SOLUTION INTRAVENOUS PRN
Status: DISCONTINUED | OUTPATIENT
Start: 2023-05-16 | End: 2023-05-17 | Stop reason: HOSPADM

## 2023-05-16 RX ORDER — LANOLIN ALCOHOL/MO/W.PET/CERES
3 CREAM (GRAM) TOPICAL NIGHTLY
Status: DISCONTINUED | OUTPATIENT
Start: 2023-05-16 | End: 2023-05-17 | Stop reason: HOSPADM

## 2023-05-16 RX ORDER — HEPARIN SODIUM 1000 [USP'U]/ML
80 INJECTION, SOLUTION INTRAVENOUS; SUBCUTANEOUS PRN
Status: DISCONTINUED | OUTPATIENT
Start: 2023-05-16 | End: 2023-05-17 | Stop reason: HOSPADM

## 2023-05-16 RX ORDER — PANTOPRAZOLE SODIUM 40 MG/1
40 TABLET, DELAYED RELEASE ORAL DAILY
Status: DISCONTINUED | OUTPATIENT
Start: 2023-05-16 | End: 2023-05-17 | Stop reason: HOSPADM

## 2023-05-16 RX ORDER — METOPROLOL SUCCINATE 25 MG/1
25 TABLET, EXTENDED RELEASE ORAL DAILY
Status: DISCONTINUED | OUTPATIENT
Start: 2023-05-16 | End: 2023-05-17 | Stop reason: HOSPADM

## 2023-05-16 RX ORDER — ATORVASTATIN CALCIUM 10 MG/1
20 TABLET, FILM COATED ORAL DAILY
Status: DISCONTINUED | OUTPATIENT
Start: 2023-05-16 | End: 2023-05-17 | Stop reason: HOSPADM

## 2023-05-16 RX ORDER — HEPARIN SODIUM 1000 [USP'U]/ML
40 INJECTION, SOLUTION INTRAVENOUS; SUBCUTANEOUS PRN
Status: DISCONTINUED | OUTPATIENT
Start: 2023-05-16 | End: 2023-05-17 | Stop reason: HOSPADM

## 2023-05-16 RX ORDER — HEPARIN SODIUM 10000 [USP'U]/100ML
5-30 INJECTION, SOLUTION INTRAVENOUS CONTINUOUS
Status: DISCONTINUED | OUTPATIENT
Start: 2023-05-16 | End: 2023-05-17 | Stop reason: HOSPADM

## 2023-05-16 RX ORDER — LANOLIN ALCOHOL/MO/W.PET/CERES
1000 CREAM (GRAM) TOPICAL DAILY
Status: DISCONTINUED | OUTPATIENT
Start: 2023-05-16 | End: 2023-05-17 | Stop reason: HOSPADM

## 2023-05-16 RX ORDER — POLYETHYLENE GLYCOL 3350 17 G/17G
17 POWDER, FOR SOLUTION ORAL DAILY PRN
Status: DISCONTINUED | OUTPATIENT
Start: 2023-05-16 | End: 2023-05-17 | Stop reason: HOSPADM

## 2023-05-16 RX ORDER — SODIUM CHLORIDE 0.9 % (FLUSH) 0.9 %
5-40 SYRINGE (ML) INJECTION PRN
Status: DISCONTINUED | OUTPATIENT
Start: 2023-05-16 | End: 2023-05-17 | Stop reason: HOSPADM

## 2023-05-16 RX ORDER — TAMSULOSIN HYDROCHLORIDE 0.4 MG/1
0.4 CAPSULE ORAL DAILY
Status: DISCONTINUED | OUTPATIENT
Start: 2023-05-16 | End: 2023-05-17 | Stop reason: HOSPADM

## 2023-05-16 RX ADMIN — ATORVASTATIN CALCIUM 20 MG: 10 TABLET, FILM COATED ORAL at 09:59

## 2023-05-16 RX ADMIN — TAMSULOSIN HYDROCHLORIDE 0.4 MG: 0.4 CAPSULE ORAL at 09:59

## 2023-05-16 RX ADMIN — LISINOPRIL 2.5 MG: 5 TABLET ORAL at 09:59

## 2023-05-16 RX ADMIN — SODIUM CHLORIDE, PRESERVATIVE FREE 10 ML: 5 INJECTION INTRAVENOUS at 20:57

## 2023-05-16 RX ADMIN — CYANOCOBALAMIN TAB 1000 MCG 1000 MCG: 1000 TAB at 09:59

## 2023-05-16 RX ADMIN — LORAZEPAM 1 MG: 1 TABLET ORAL at 20:50

## 2023-05-16 RX ADMIN — HEPARIN SODIUM 14 UNITS/KG/HR: 10000 INJECTION, SOLUTION INTRAVENOUS at 20:40

## 2023-05-16 RX ADMIN — MUPIROCIN: 20 OINTMENT TOPICAL at 14:03

## 2023-05-16 RX ADMIN — FERROUS SULFATE TAB 325 MG (65 MG ELEMENTAL FE) 325 MG: 325 (65 FE) TAB at 09:59

## 2023-05-16 RX ADMIN — CEFEPIME 2000 MG: 2 INJECTION, POWDER, FOR SOLUTION INTRAVENOUS at 10:02

## 2023-05-16 RX ADMIN — PANTOPRAZOLE SODIUM 40 MG: 40 TABLET, DELAYED RELEASE ORAL at 09:59

## 2023-05-16 RX ADMIN — HEPARIN SODIUM 18 UNITS/KG/HR: 10000 INJECTION, SOLUTION INTRAVENOUS at 02:59

## 2023-05-16 RX ADMIN — HEPARIN SODIUM 3230 UNITS: 1000 INJECTION INTRAVENOUS; SUBCUTANEOUS at 02:55

## 2023-05-16 RX ADMIN — SODIUM CHLORIDE, PRESERVATIVE FREE 5 ML: 5 INJECTION INTRAVENOUS at 10:00

## 2023-05-16 RX ADMIN — METOPROLOL SUCCINATE 25 MG: 25 TABLET, EXTENDED RELEASE ORAL at 09:59

## 2023-05-16 NOTE — H&P
History and Physical      Name:  Ventura Magdaleno /Age/Sex: 1928  (80 y.o. female)   MRN & CSN:  [id number] & [id number] Encounter Date/Time: 2023 1:53 AM EDT   Location:  ED16/ED-16 PCP: [facility] Day: 2    Assessment and Plan:     #. Pulmonary embolism  -CTA chest-pulmonary embolism within the right interlobar pulmonary artery and lower branch of the right lower lobe. This appears to adhere to the interval and may be chronic, however an acute embolism is not excluded. -Patient started on heparin drip in ER  -Check bilateral lower extremity for DVT. -2D echo ordered.  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  -Consult hematology/oncology. #.  Emphysematous cystitis  -UA-small leukocyte esterase, nitrate positive, WBC 32, bacteria negative. -CT abdomen/pelvis-acute emphysematous cystitis-appearance of gas demonstrated within the anterior aspect of the urinary bladder wall  -Patient received ceftriaxone in ED  -Urine culture-2023, 3/2023- Klebsiella aerogenes sensitive to cefepime, ceftriaxone  -Continue cefepime until cultures are reported. -Urine culture in process. #.  Atrophic left kidney as per CT abdomen/pelvis    #. CARL on CKD  -Creatinine 1.1-3/28/2023, 1.4 today. -Monitor with repeat BMP. #.  Nausea, vomiting-currently resolved  -CT abdomen/pelvis-moderate-sized hiatal hernia  -Continue Protonix. #.  Recent admission to the hospital 3/20/2023 for acute cystitis    #. Hypertension -patient is on metoprolol, lisinopril  -Resume with holding parameters    #. Bilateral lower extremity edema  -As per care everywhere patient has congestive heart failure  -Patient is on Lasix-resume when appropriate. #.  Osteoarthritis/chronic pain  -Patient is on Norco-resume    #.   Dementia/generalized anxiety disorder  -Patient is on Seroquel, as

## 2023-05-16 NOTE — ACP (ADVANCE CARE PLANNING)
Advance Care Planning     Advance Care Planning Inpatient Note  Spiritual Care Department    Today's Date: 5/16/2023  Unit: [facility]N    Received request from patient. Upon review of chart and communication with care team, patient's decision making abilities are not in question. . Patient was/were present in the room during visit. Goals of ACP Conversation:  Patient brought HCPOA/LW completed and signed at the 's office. Document scanned and put in her chart. Health Care Decision Makers:       Primary Decision Maker (Active): Marta Ashish Child - [phone]  Summary:  Verified [facility]  Documented Next of Kin, per patient report    Patient brought documents from home. This  versified them, scanned to the patient's chart  [address] [facility] of /Advance Directive Appointment of [address]  Living Will/Advance Directive        Assessment:  Patient was in good spirit during the visit and appreciated pastoral support provided    Interventions:  Requested patient/family to submit existing document for our records: Healthcare Power of /Advance Directive Appointment of [address] [facility] Preferences Communicated:   No    Outcomes/Plan:  Copy of advance directive given to staff to scan into medical record.     Electronically signed by Billy Barton  on 5/16/2023 at 1:31 PM

## 2023-05-16 NOTE — CARE COORDINATION
Case Management Assessment  Initial Evaluation    Date/Time of Evaluation: 5/16/2023 1:45 PM  Assessment Completed by: Adeel Webster RN    If patient is discharged prior to next notation, then this note serves as note for discharge by case management. Patient Name: Kinza Dailey                   YOB: 1928  Diagnosis: Hiatal hernia [K44.9]  Emphysematous cystitis [N30.80]  Pulmonary embolism on right [facility]) [I26.99]  Pulmonary embolism, unspecified chronicity, unspecified pulmonary embolism type, unspecified whether acute cor pulmonale present ([facility] [address] [I26.99]                   Date / Time: 5/15/2023  6:34 PM    Patient Admission Status: Inpatient   Readmission Risk (Low < 19, Mod (19-27), High > 27): Readmission Risk Score: 16.8    Current PCP: Wolfgang Jorgensen, DO  PCP verified by CM? Yes    Chart Reviewed: Yes      History Provided by: Friend  Patient Orientation: Alert and Oriented    Patient Cognition: Alert    Hospitalization in the last 30 days (Readmission):  No    If yes, Readmission Assessment in CM Navigator will be completed. Advance Directives:      Code Status: Full Code   Patient's Primary Decision Maker is: Legal Next of Kin    Primary Decision Maker (Active): Clark Domínguez - Child - [phone]    Discharge Planning:    Patient lives with:   Type of Home:    Primary Care Giver: Self  Patient Support Systems include: Children   Current Financial resources: Medicare  Current community resources: None  Current services prior to admission:              Current DME:              Type of Home Care services:       ADLS  Prior functional level: Assistance with the following:, Cooking, Housework, Shopping  Current functional level: Assistance with the following:, Bathing, Toileting, Mobility    PT AM-PAC:   /24  OT AM-PAC:   /24    Family can provide assistance at DC:  Yes  Would you like Case Management to discuss the discharge plan with any other family members/significant others, and if so, who?

## 2023-05-16 NOTE — FLOWSHEET NOTE
Called Lab r/g ptt that was drawn at [phone] and still not resulted. Lab stated that they found the tube but had a problem with the sticker and will re run it now. Verbalized urgent need of result due to Hep gtt. Will continue to monitor.

## 2023-05-16 NOTE — CONSULTS
pressure off. Pt turns independently but may need cues due to memory deficitis. Atmos air pump applied to mattress. Lying on left side. Pictures and measurements taken. Pt is a mild risk for skin breakdown AEB Clark. Follow Clark orders. Updated nurse. Specialty Bed Required : yes  [] Low Air Loss   [x] Pressure Redistribution  [] Fluid Immersion  [] Bariatric  [] Total Pressure Relief  [] Other:     Discharge Plan:  Placement for patient upon discharge: tbd  Hospice Care: no  Patient appropriate for Outpatient [address]: [facility]    Patient/Caregiver Teaching:  Level of patient/caregiver understanding able to:   Needs reinforcement.         Electronically signed by Lizy Rodríguez RN, Shania Nolasco on 5/16/2023 at 10:35 AM
collateral history and was not reachable this morning, will reattempt. I have independently evaluated and examined this patient today. I have reviewed radiologic and biochemical tests on this patient. Management Plan is developed mutually with Jen Watts PA-C. I have reviewed above note and agree with assessment and plan.

## 2023-05-16 NOTE — ED NOTES
ED TO INPATIENT SBAR HANDOFF    Patient Name: Shankar Zelaya   :  1928  89 y.o. Preferred Name  Ruchi  Family/Caregiver Present no   Restraints no   C-SSRS: Risk of Suicide: No Risk  Sitter no   Sepsis Risk Score Sepsis Risk Score: 2.17      Situation  Chief Complaint   Patient presents with    Emesis     X 3-4 hours, ate lunch and has been vomiting since, family states she has a hx of gallbladder issues in the past     Brief Description of Patient's Condition: Emesis (X 3-4 hours, ate lunch and has been vomiting since, family states she has a hx of gallbladder issues in the past)  Mental Status: oriented and alert  Arrived from: home    Imaging:   CTA PULMONARY W CONTRAST   Final Result   1. Pulmonary embolism within the right interlobar pulmonary artery and lower   branch of the right lower lobe. This appears to adhere to the inner wall and   may be chronic, however, an acute embolism is not excluded. 2. Large hiatal hernia. Critical results were called by Dr. Myles Mattson. Justina Schuler MD to Nellie QURESHI of   the [facility] emergency department on 5/15/2023 at   22:44. CT ABDOMEN PELVIS W IV CONTRAST Additional Contrast? None   Final Result   1. Acute emphysematous cystitis. 2. Diverticulosis coli without CT evidence of acute diverticulitis. 3. Possible acute pulmonary embolism to branches of the right lower lobe. Consider CT PA correlation. 4. Prominent atrophy left kidney. 5. Moderate size hiatal hernia   The results were called by Dr. Yanet Jeffries to [facility] on   5/15/2023 at 21:17. XR CHEST PORTABLE   Final Result   1. No acute cardiopulmonary abnormality. 2. Hiatal hernia.          VL DUP LOWER EXTREMITY VENOUS BILATERAL    (Results Pending)     Abnormal labs:   Abnormal Labs Reviewed   CBC WITH AUTO DIFFERENTIAL - Abnormal; Notable for the following components:       Result Value    WBC 13.7 (*)     Hematocrit 48.4 (*)     Segs Relative

## 2023-05-16 NOTE — ED NOTES
Pt was changed, repositioned, had complete bed change, gave warm blankets, and provided ice water at this time.      Celia Man RN  05/16/23 8628

## 2023-05-17 VITALS
WEIGHT: 85 LBS | RESPIRATION RATE: 14 BRPM | HEIGHT: 62 IN | SYSTOLIC BLOOD PRESSURE: 175 MMHG | TEMPERATURE: 97.5 F | BODY MASS INDEX: 15.64 KG/M2 | HEART RATE: 64 BPM | OXYGEN SATURATION: 97 % | DIASTOLIC BLOOD PRESSURE: 74 MMHG

## 2023-05-17 LAB
ALBUMIN SERPL-MCNC: 3 GM/DL (ref 3.4–5)
ALP BLD-CCNC: 48 IU/L (ref 40–129)
ALT SERPL-CCNC: 6 U/L (ref 10–40)
ANION GAP SERPL CALCULATED.3IONS-SCNC: 5 MMOL/L (ref 4–16)
APTT: 109 SECONDS (ref 25.1–37.1)
APTT: 42.2 SECONDS (ref 25.1–37.1)
APTT: 66.7 SECONDS (ref 25.1–37.1)
AST SERPL-CCNC: 16 IU/L (ref 15–37)
BASOPHILS ABSOLUTE: 0.1 K/CU MM
BASOPHILS RELATIVE PERCENT: 0.7 % (ref 0–1)
BILIRUB SERPL-MCNC: 0.3 MG/DL (ref 0–1)
BUN SERPL-MCNC: 26 MG/DL (ref 6–23)
CALCIUM SERPL-MCNC: 7.8 MG/DL (ref 8.3–10.6)
CHLORIDE BLD-SCNC: 104 MMOL/L (ref 99–110)
CO2: 27 MMOL/L (ref 21–32)
CREAT SERPL-MCNC: 1.2 MG/DL (ref 0.6–1.1)
DIFFERENTIAL TYPE: ABNORMAL
EKG ATRIAL RATE: 95 BPM
EKG DIAGNOSIS: NORMAL
EKG P-R INTERVAL: 128 MS
EKG Q-T INTERVAL: 570 MS
EKG QRS DURATION: 70 MS
EKG QTC CALCULATION (BAZETT): 716 MS
EKG R AXIS: -19 DEGREES
EKG T AXIS: 42 DEGREES
EKG VENTRICULAR RATE: 95 BPM
EOSINOPHILS ABSOLUTE: 0.1 K/CU MM
EOSINOPHILS RELATIVE PERCENT: 1 % (ref 0–3)
GFR SERPL CREATININE-BSD FRML MDRD: 42 ML/MIN/1.73M2
GLUCOSE SERPL-MCNC: 76 MG/DL (ref 70–99)
HCT VFR BLD CALC: 38.3 % (ref 37–47)
HEMOGLOBIN: 11.9 GM/DL (ref 12.5–16)
IMMATURE NEUTROPHIL %: 0.3 % (ref 0–0.43)
LYMPHOCYTES ABSOLUTE: 1.6 K/CU MM
LYMPHOCYTES RELATIVE PERCENT: 22.8 % (ref 24–44)
MCH RBC QN AUTO: 29.6 PG (ref 27–31)
MCHC RBC AUTO-ENTMCNC: 31.1 % (ref 32–36)
MCV RBC AUTO: 95.3 FL (ref 78–100)
MONOCYTES ABSOLUTE: 0.4 K/CU MM
MONOCYTES RELATIVE PERCENT: 6.3 % (ref 0–4)
NUCLEATED RBC %: 0 %
PDW BLD-RTO: 14.6 % (ref 11.7–14.9)
PLATELET # BLD: 184 K/CU MM (ref 140–440)
PMV BLD AUTO: 9.8 FL (ref 7.5–11.1)
POTASSIUM SERPL-SCNC: 3.4 MMOL/L (ref 3.5–5.1)
RBC # BLD: 4.02 M/CU MM (ref 4.2–5.4)
SEGMENTED NEUTROPHILS ABSOLUTE COUNT: 4.8 K/CU MM
SEGMENTED NEUTROPHILS RELATIVE PERCENT: 68.9 % (ref 36–66)
SODIUM BLD-SCNC: 136 MMOL/L (ref 135–145)
TOTAL IMMATURE NEUTOROPHIL: 0.02 K/CU MM
TOTAL NUCLEATED RBC: 0 K/CU MM
TOTAL PROTEIN: 5.7 GM/DL (ref 6.4–8.2)
WBC # BLD: 7 K/CU MM (ref 4–10.5)

## 2023-05-17 PROCEDURE — 6360000002 HC RX W HCPCS: Performed by: INTERNAL MEDICINE

## 2023-05-17 PROCEDURE — 2580000003 HC RX 258: Performed by: INTERNAL MEDICINE

## 2023-05-17 PROCEDURE — 6370000000 HC RX 637 (ALT 250 FOR IP): Performed by: INTERNAL MEDICINE

## 2023-05-17 PROCEDURE — 94761 N-INVAS EAR/PLS OXIMETRY MLT: CPT

## 2023-05-17 PROCEDURE — 80053 COMPREHEN METABOLIC PANEL: CPT

## 2023-05-17 PROCEDURE — 93010 ELECTROCARDIOGRAM REPORT: CPT | Performed by: INTERNAL MEDICINE

## 2023-05-17 PROCEDURE — 85730 THROMBOPLASTIN TIME PARTIAL: CPT

## 2023-05-17 PROCEDURE — 85025 COMPLETE CBC W/AUTO DIFF WBC: CPT

## 2023-05-17 PROCEDURE — 36415 COLL VENOUS BLD VENIPUNCTURE: CPT

## 2023-05-17 PROCEDURE — 99232 SBSQ HOSP IP/OBS MODERATE 35: CPT | Performed by: INTERNAL MEDICINE

## 2023-05-17 RX ORDER — CEFUROXIME AXETIL 250 MG/1
250 TABLET ORAL 2 TIMES DAILY
Qty: 10 TABLET | Refills: 0 | Status: SHIPPED | OUTPATIENT
Start: 2023-05-17 | End: 2023-05-22

## 2023-05-17 RX ORDER — FERROUS SULFATE 325(65) MG
325 TABLET ORAL EVERY OTHER DAY
Qty: 30 TABLET | Refills: 3
Start: 2023-05-17

## 2023-05-17 RX ORDER — POTASSIUM CHLORIDE 20 MEQ/1
10 TABLET, EXTENDED RELEASE ORAL ONCE
Status: COMPLETED | OUTPATIENT
Start: 2023-05-17 | End: 2023-05-17

## 2023-05-17 RX ADMIN — ATORVASTATIN CALCIUM 20 MG: 10 TABLET, FILM COATED ORAL at 08:49

## 2023-05-17 RX ADMIN — METOPROLOL SUCCINATE 25 MG: 25 TABLET, EXTENDED RELEASE ORAL at 08:49

## 2023-05-17 RX ADMIN — TAMSULOSIN HYDROCHLORIDE 0.4 MG: 0.4 CAPSULE ORAL at 08:44

## 2023-05-17 RX ADMIN — POTASSIUM CHLORIDE 10 MEQ: 1500 TABLET, EXTENDED RELEASE ORAL at 14:52

## 2023-05-17 RX ADMIN — LISINOPRIL 2.5 MG: 5 TABLET ORAL at 08:44

## 2023-05-17 RX ADMIN — CEFEPIME 1000 MG: 1 INJECTION, POWDER, FOR SOLUTION INTRAMUSCULAR; INTRAVENOUS at 08:58

## 2023-05-17 RX ADMIN — MUPIROCIN: 20 OINTMENT TOPICAL at 09:00

## 2023-05-17 RX ADMIN — FERROUS SULFATE TAB 325 MG (65 MG ELEMENTAL FE) 325 MG: 325 (65 FE) TAB at 08:49

## 2023-05-17 RX ADMIN — CYANOCOBALAMIN TAB 1000 MCG 1000 MCG: 1000 TAB at 08:49

## 2023-05-17 NOTE — CARE COORDINATION
[facility] Liaison spoke with pt/son Audra Grissom. Audra Grissom declines therapy but is agreeable for a nurse visit. Aware of discharge & will initiate [facility]. Verified address, pcp and number.

## 2023-05-17 NOTE — PLAN OF CARE
Problem: Discharge Planning  Goal: Discharge to home or other facility with appropriate resources  Outcome: Progressing     Problem: Chronic Conditions and Co-morbidities  Goal: Patient's chronic conditions and co-morbidity symptoms are monitored and maintained or improved  Outcome: Progressing     Problem: Nutrition Deficit:  Goal: Optimize nutritional status  5/16/2023 2334 by Alex Cross RN  Outcome: Progressing  5/16/2023 1344 by Claudette Medford, RD, LD  Flowsheets (Taken 5/16/2023 1344)  Nutrient intake appropriate for improving, restoring, or maintaining nutritional needs:   Monitor oral intake, labs, and treatment plans   Assess nutritional status and recommend course of action   Recommend appropriate diets, oral nutritional supplements, and vitamin/mineral supplements     Problem: Safety - Adult  Goal: Free from fall injury  Outcome: Progressing

## 2023-05-17 NOTE — PROGRESS NOTES
to arrange for home health care if okay with family    Started Eliquis  Creatinine clearance 17 the day of discharge  Order for wound care to ear rhythm    Cefuroxime as last E. coli was pan statin sensitive  Potassium was 3.4 the day of discharge  Albumin 3.0  BMI is    Mupirocin Rx  Discharge order  Wound care
05/16/23 1401   Encounter Summary   Encounter Overview/Reason  Initial Encounter   Service Provided For: Patient and family together   Referral/Consult From: [facility]   Last Encounter  05/16/23  (Assisted patient with HCPOA/LW. Documentrs scanned to patient's chart.)   Complexity of Encounter Low   Begin Time 8173  (provided assistance with Advance Directives.)   End Time  1401   Total Time Calculated 54 min   Encounter    Type Initial Screen/Assessment   Spiritual/Emotional needs   Type Spiritual Support   Grief, Loss, and Adjustments   Type Adjustment to illness   Advance Care Planning   Type ACP conversation  (Patient brought documents from home. This  scanned patient's documents to patient's chart.)   Assessment/Intervention/Outcome   Assessment Calm;Coping   Intervention Active listening;Empowerment; Other (Comment); Nurtured Hope;Sustaining Presence/Ministry of presence   Outcome Encouraged;Engaged in conversation;Expressed Gratitude   Plan and Referrals   Plan/Referrals No future visits requested
4 Eyes Skin Assessment     NAME:  Ruchi Salazar OF BIRTH:  5/14/1928  MEDICAL RECORD NUMBER:  [id number]    The patient is being assessed for  Admission    I agree that at least one RN has performed a thorough Head to Toe Skin Assessment on the patient. ALL assessment sites listed below have been assessed. Areas assessed by both nurses:  Head, Neck, Chest, Abdomen, Buttocks, Legs          Does the Patient have a Wound? Yes wound(s) were present on assessment. LDA wound assessment was Initiated and completed by RN       Clark Prevention initiated by RN: Yes  Wound Care Orders initiated by RN: Yes    Pressure Injury (Stage 3,4, Unstageable, DTI, NWPT, and Complex wounds) if present, place Wound referral order by RN under : Yes    New Ostomies, if present place, Ostomy referral order under : No     Nurse 1 eSignature: Electronically signed by Kae Brambila RN on 5/16/23 at 7:50 AM EDT    **SHARE this note so that the co-signing nurse can place an eSignature**    Nurse 2 eSignature: Electronically signed by Ania Frye RN on 5/16/23 at 7:53 AM EDT
CBC and ApTT drawn by nurse and sent to lab
Comprehensive Nutrition Assessment    Type and Reason for Visit:  Initial, Consult (Clark score consult)    Nutrition Recommendations/Plan:   Continue soft and bite sized diet as tolerates  Will offer standard and frozen oral nutrition supplements  Assist with meals as needed   Will continue to follow up during stay      Malnutrition Assessment:  Malnutrition Status:  Severe malnutrition (chronic/ongoing problem) (05/16/23 0302)    Context:  Chronic Illness     Findings of the 6 clinical characteristics of malnutrition:  Energy Intake:  Unable to assess  Weight Loss:  Greater than 7.5% over 3 months     Body Fat Loss:  Severe body fat loss Orbital, Buccal region   Muscle Mass Loss:  Severe muscle mass loss Clavicles (pectoralis & deltoids), Temples (temporalis), Thigh (quadraceps)  Fluid Accumulation:  No significant fluid accumulation Extremities   Strength:  Not Performed    Nutrition Assessment:    Admit with emesis, pulmonary embolism. Hx severe malnutrition in illness, continues to meet criteria. Currently on soft and bite sized diet with low sodium. Meal intake varying at recent meals 25-75%. Will plan to offer oral nutrition supplements during stay. Will follow at high nutrition risk at this time with increased needs, predicted suboptimal intake. Nutrition Related Findings:    resting in bed, small female, advance age,   hx cystitis, dementia Wound Type: Pressure Injury, Stage I, Unstageable (wound on ear, stg 1 scarum)       Current Nutrition Intake & Therapies:    Average Meal Intake: 26-50%, 51-75%  Average Supplements Intake: None Ordered  ADULT DIET; Dysphagia - Soft and Bite Sized; Low Sodium (2 gm)    Anthropometric Measures:  Height: 5' 2\" (157.5 cm)  Ideal Body Weight (IBW): 110 lbs (50 kg)       Current Body Weight: 81 lb 9.1 oz (37 kg), 74.2 % IBW.  Weight Source: Bed Scale  Current BMI (kg/m2): 14.9  Usual Body Weight: 89 lb (40.4 kg) (per hx admit March 2023)  % Weight Change (Calculated):
Lab called this nurse about needing redraw for CBC and to go ahead and redraw the aptt also.   Ordered for stat
Lab was called to receive aptt results that were drawn at 0208 and were not in epic yet. Result was 109.6  No bolus no change per heparin drip protocol. Rate was verified.
Outpatient Pharmacy Progress Note for Meds-to-Beds    Total number of Prescriptions Filled: 3  The following medications were dispensed to the patient during the discharge process:  Eliquis  Cefuroxime  Mupirocin     Additional Documentation:  Patient picked-up the medication(s) in the [address] [place] [address] coupon card for Eliquis was applied to provide patient a $0.00 co-pay for this medication. Future monthly co-pays will be $85 with patient's current insurance coverage. Thank you for letting us serve your patients.   [facility]    [address] [facility]    Phone: [phone]    Fax: [phone]
PTT was drawn by lab at 1115. Still not resulted. Will continue to monitor.
Patient confused, and restless. Pulled one of her IV access stating she does not need any medication. Refused all her night time meds but ativan.  in place. Will continue to monitor.
Patient is on heparin drip. The 2130 aptt lab value of 60 was drawn after the heparin drip had just been restarted. No change to the rate. Will recheck at 0300 and titrate per order.
Physician Progress Note      Aden Martinez  CSN #:                  [id number]  :                       1928  ADMIT DATE:       5/15/2023 6:34 PM  DISCH DATE:  Flaco Nguyen  PROVIDER #:        Miranda Monreal MD          QUERY TEXT:    Pt admitted with Emphysematous cystitis. Pt noted to have ***. If possible,   please document in the progress notes and discharge summary if you are   evaluating and /or treating any of the following: The medical record reflects the following:  Risk Factors:  Emphysematous cystitis, CARL,  Recent admission to the hospital   3/20/2023 for acute cystitis  Clinical Indicators: UA-small leukocyte esterase, nitrate positive, WBC 32,   bacteria negative. -CT abdomen/pelvis-acute emphysematous cystitis-appearance of gas demonstrated   within the anterior aspect of the urinary bladder wall-Urine culture-2023,   3/2023- Klebsiella aerogenes sensitive to cefepime, ceftriaxone,  WBC 13.7, HR     Treatment: cefepime, UA and CX, CT abdomen and pelvis w IV contrast    Thank you, Manuel Giles RN, CDS ([phone])  Options provided:  -- Sepsis, present on admission  -- Sepsis, present on admission, now resolved  -- Emphysematous cystitis without Sepsis  -- Other - I will add my own diagnosis  -- Disagree - Not applicable / Not valid  -- Disagree - Clinically unable to determine / Unknown  -- Refer to Clinical Documentation Reviewer    PROVIDER RESPONSE TEXT:    This patient has sepsis which was present on admission.     Query created by: Hazel Jackson on 2023 8:36 AM      Electronically signed by:  Miranda Monreal MD 2023 8:40 AM
RENAL DOSE ADJUSTMENT MADE PER P/T PROTOCOL    PREVIOUS ORDER:  Cefepime 2 g q12h    Indication: UTI    Estimated Creatinine Clearance: 15 mL/min (A) (based on SCr of 1.4 mg/dL (H)).   Recent Labs     05/15/23  1858   BUN 32*   CREATININE 1.4*        NEW RENALLY ADJUSTED ORDER:  Cefepime 2 g x 1, followed by 1 g q24h    GRIS Rodriguez [facility]  5/16/2023 5:48 AM
Received call from hematoogy in lab discussing concern with the heparin numbers. States the 0230 draw was  actually 42.2 and it was a short draw. Redraw 66.6 with full tube and agreed it was a better draw and result. Re-timed next draw for 1100.
This nurse called lab and official aptt is 66.6. No bolus and no change.
HISTORY: ORDERING SYSTEM PROVIDED HISTORY: vomiting TECHNOLOGIST PROVIDED HISTORY: Reason for exam:->vomiting Reason for Exam: emesis Additional signs and symptoms: emesis, cough FINDINGS: 3 images submitted for review. Clear lungs. No pneumothorax or pleural effusion. Cardiac and mediastinal contours stable. Large hiatal hernia redemonstrated. Sternotomy wires unchanged. No acute osseous abnormality. 1. No acute cardiopulmonary abnormality. 2. Hiatal hernia. VL DUP LOWER EXTREMITY VENOUS BILATERAL    Result Date: 5/16/2023  EXAMINATION: DUPLEX VENOUS ULTRASOUND OF THE BILATERAL LOWER EXTREMITIES5/16/2023 4:47 am TECHNIQUE: Duplex ultrasound using B-mode/gray scaled imaging, Doppler spectral analysis and color flow Doppler was obtained of the deep venous structures of the lower bilateral extremities. COMPARISON: None. HISTORY: ORDERING SYSTEM PROVIDED HISTORY: Screening for DVT TECHNOLOGIST PROVIDED HISTORY: Reason for exam:->Screening for DVT FINDINGS: The visualized veins of the bilateral lower extremities are patent and free of echogenic thrombus. The veins demonstrate good compressibility with normal color flow study and spectral analysis. No evidence of DVT in either lower extremity. CTA PULMONARY W CONTRAST    Addendum Date: 5/16/2023    ADDENDUM: Comparison is made with the outside CT chest angiogram from 07/28/2014 that was uploaded since the initial dictation. That study demonstrated large bilateral pulmonary emboli including within the interlobar pulmonary artery and the lobar branch of the right lower lobe. This, in addition to the appearance of the embolism seen on the current study, are compatible with a chronic pulmonary embolism, however, an acute embolism is still not excluded.      Result Date: 5/16/2023  EXAMINATION: CTA OF THE CHEST 5/15/2023 10:03 pm TECHNIQUE: CTA of the chest was performed after the administration of intravenous contrast.  Multiplanar reformatted images
outside of limited mobility  -R intralobular PE with extension to lower branch RLL noted incidentally, pt asymptomatic. Appears adherent to wall ?chronicity. -BLE Doppler neg  -Past extensive PE and bilateral LE DVT in July 2014, requested imaging from [facility] to be directly compared to current imaging. As in report location is the same and overall consistent with chronic PE however acute PE cannot be ruled out based on imaging.  -Hypercoag workup was performed at [facility] in 2014 as noted in HPI however confirmation testing off heparin/after acute event was not completed, initial results are not reliable for diagnosis of thrombophilia. -Previously on warfarin x 5 years managed by PCP. Stopped after fall with hematoma in 2019.    -D-dimer elevated to 481.    -On heparin gtt. Will treat conservatively as acute PE.  -Recommend transition to Eliquis. Discussed bleeding precautions with son. Did have difficulty remaining therapeutic on warfarin in the past.   -Can consider confirmation of thrombophilia testing as outpatient, agreeable to FU in clinic. Will determine need for extended [facility] past 3-6 months. Emphysematous Cystitis  -per chart review has recurrent UTI and has been evaluated by a urologist in the past however has declined any intervention requiring anaesthesia given age and frailty.  -Urine Cx +100k E Coli, sensitivities pending  -treatment per primary    Dementia   -exacerbated by UTI, hospitalization  -Per son she always has worsening disorientation during hospitalization and is at the level he expects based on past experience. Discussed results interpretation and plan in depth with pt son who is HCPOA and primary home support. We will continue to follow.     Jen Watts PA-C

## 2023-05-17 NOTE — DISCHARGE SUMMARY
V2.0  Discharge Summary    Name:  Fay Reyes /Age/Sex: 1928 (89 y.o. female)   Admit Date: 5/15/2023  Discharge Date: 23    MRN & CSN:  [id number] & [id number] Encounter Date and Time 23 1:51 PM EDT    Attending:  Saud Buckley MD Discharging Provider: Maria E Fuentes MD       Hospital Course:     Brief HPI: Fay Reyes is a 80 y.o. female who presented with ***    Brief Problem Based Course:   ***      The patient expressed appropriate understanding of, and agreement with the discharge recommendations, medications, and plan. Consults this admission:  IP CONSULT TO ONCOLOGY  IP CONSULT TO DIETITIAN  IP CONSULT TO HOME CARE NEEDS    Discharge Diagnosis:   Pulmonary embolism on [facility])    ***    Discharge Instruction:   Follow up appointments: ***  Primary care physician: Yenifer May DO within 2 weeks  Diet: {diet:19869}   Activity: {discharge activity:92738}  Disposition: Discharged to:   []Home, []C, []SNF, []Acute Rehab, []Hospice ***  Condition on discharge: Stable  Labs and Tests to be Followed up as an outpatient by PCP or Specialist: ***    Discharge Medications:        Medication List        START taking these medications      apixaban 5 MG Tabs tablet  Commonly known as: Eliquis  Take 1 tablet by mouth 2 times daily     cefUROXime 250 MG tablet  Commonly known as: CEFTIN  Take 1 tablet by mouth 2 times daily for 5 days     mupirocin 2 % ointment  Commonly known as: BACTROBAN  Apply topically 3 times daily.             CHANGE how you take these medications      ferrous sulfate 325 (65 Fe) MG tablet  Commonly known as: IRON 325  Take 1 tablet by mouth every other day  What changed: when to take this            CONTINUE taking these medications      cyanocobalamin 1000 MCG tablet     furosemide 40 MG tablet  Commonly known as: LASIX     gentamicin 0.1 % ointment  Commonly known as: GARAMYCIN     HYDROcodone-acetaminophen 5-325 MG per tablet  Commonly known as:
ORDERING SYSTEM PROVIDED HISTORY: further eval from ct abd, concern for PE TECHNOLOGIST PROVIDED HISTORY: Reason for exam:->further eval from ct abd, concern for PE Decision Support Exception - unselect if not a suspected or confirmed emergency medical condition->Emergency Medical Condition (MA) Reason for Exam: further eval from ct abd, concern for PE FINDINGS: Pulmonary Arteries: The pulmonary arteries are adequately opacified for evaluation. The main pulmonary artery is normal in caliber. There is a filling defect within the right interlobar pulmonary artery that is adherent to the lateral wall. This extends into the lobar branch of the right lower lobe. No other filling defects identified. Mediastinum: The thoracic aorta is normal in caliber with moderate atherosclerosis. No acute aortic abnormality identified. Status post CABG. No acute abnormality in the branch vessels of the superior mediastinum and lower neck. The heart is normal in size. No pericardial effusion. Large hiatal hernia. Small volume of fluid in the mediastinal esophagus. No definite wall thickening. No lymphadenopathy or pneumomediastinum. Lungs/pleura: The central airways are patent. No pleural effusion or pneumothorax. Mild right basilar atelectasis or scarring. No consolidation or interlobular septal thickening. Upper Abdomen: Limited images through the upper abdomen demonstrate no acute process. Soft Tissues/Bones: Status post median sternotomy. No acute osseous or soft tissue abnormality. 1. Pulmonary embolism within the right interlobar pulmonary artery and lower branch of the right lower lobe. This appears to adhere to the inner wall and may be chronic, however, an acute embolism is not excluded. 2. Large hiatal hernia. Critical results were called by Dr. Mindy Long. Annabelle Ybarra MD to Storm QURESHI of the [facility] emergency department on 5/15/2023 at 22:44.        CBC:   Recent Labs

## 2023-05-18 LAB
CULTURE: ABNORMAL
CULTURE: ABNORMAL
Lab: ABNORMAL
SPECIMEN: ABNORMAL

## 2023-06-30 ENCOUNTER — HOSPITAL ENCOUNTER (INPATIENT)
Age: 88
LOS: 7 days | Discharge: HOME | DRG: 602 | End: 2023-07-07
Attending: EMERGENCY MEDICINE | Admitting: INTERNAL MEDICINE
Payer: MEDICARE

## 2023-06-30 ENCOUNTER — APPOINTMENT (OUTPATIENT)
Dept: GENERAL RADIOLOGY | Age: 88
DRG: 602 | End: 2023-06-30
Payer: MEDICARE

## 2023-06-30 DIAGNOSIS — F05 DELIRIUM DUE TO MEDICAL CONDITION WITHOUT BEHAVIORAL DISTURBANCE: ICD-10-CM

## 2023-06-30 DIAGNOSIS — T14.8XXA WOUND INFECTION: Primary | ICD-10-CM

## 2023-06-30 DIAGNOSIS — L08.9 WOUND INFECTION: Primary | ICD-10-CM

## 2023-06-30 DIAGNOSIS — L03.115 CELLULITIS OF RIGHT LOWER EXTREMITY: ICD-10-CM

## 2023-06-30 LAB
ALBUMIN SERPL-MCNC: 3.4 GM/DL (ref 3.4–5)
ALP BLD-CCNC: 67 IU/L (ref 40–129)
ALT SERPL-CCNC: 7 U/L (ref 10–40)
ANION GAP SERPL CALCULATED.3IONS-SCNC: 7 MMOL/L (ref 4–16)
AST SERPL-CCNC: 15 IU/L (ref 15–37)
BASOPHILS ABSOLUTE: 0.1 K/CU MM
BASOPHILS RELATIVE PERCENT: 0.7 % (ref 0–1)
BILIRUB SERPL-MCNC: 0.3 MG/DL (ref 0–1)
BUN SERPL-MCNC: 40 MG/DL (ref 6–23)
CALCIUM SERPL-MCNC: 9.2 MG/DL (ref 8.3–10.6)
CHLORIDE BLD-SCNC: 105 MMOL/L (ref 99–110)
CO2: 26 MMOL/L (ref 21–32)
CREAT SERPL-MCNC: 1.5 MG/DL (ref 0.6–1.1)
DIFFERENTIAL TYPE: ABNORMAL
EOSINOPHILS ABSOLUTE: 0.2 K/CU MM
EOSINOPHILS RELATIVE PERCENT: 1.9 % (ref 0–3)
GFR SERPL CREATININE-BSD FRML MDRD: 32 ML/MIN/1.73M2
GLUCOSE SERPL-MCNC: 111 MG/DL (ref 70–99)
HCT VFR BLD CALC: 40.1 % (ref 37–47)
HEMOGLOBIN: 12.5 GM/DL (ref 12.5–16)
IMMATURE NEUTROPHIL %: 0.2 % (ref 0–0.43)
LACTIC ACID, SEPSIS: 1 MMOL/L (ref 0.5–1.9)
LYMPHOCYTES ABSOLUTE: 1.7 K/CU MM
LYMPHOCYTES RELATIVE PERCENT: 20 % (ref 24–44)
MCH RBC QN AUTO: 29.8 PG (ref 27–31)
MCHC RBC AUTO-ENTMCNC: 31.2 % (ref 32–36)
MCV RBC AUTO: 95.7 FL (ref 78–100)
MONOCYTES ABSOLUTE: 0.5 K/CU MM
MONOCYTES RELATIVE PERCENT: 6.6 % (ref 0–4)
PDW BLD-RTO: 13.7 % (ref 11.7–14.9)
PLATELET # BLD: 175 K/CU MM (ref 140–440)
PMV BLD AUTO: 10.5 FL (ref 7.5–11.1)
POTASSIUM SERPL-SCNC: 5 MMOL/L (ref 3.5–5.1)
RBC # BLD: 4.19 M/CU MM (ref 4.2–5.4)
SEGMENTED NEUTROPHILS ABSOLUTE COUNT: 5.8 K/CU MM
SEGMENTED NEUTROPHILS RELATIVE PERCENT: 70.6 % (ref 36–66)
SODIUM BLD-SCNC: 138 MMOL/L (ref 135–145)
TOTAL IMMATURE NEUTOROPHIL: 0.02 K/CU MM
TOTAL PROTEIN: 6.4 GM/DL (ref 6.4–8.2)
WBC # BLD: 8.2 K/CU MM (ref 4–10.5)

## 2023-06-30 PROCEDURE — 80053 COMPREHEN METABOLIC PANEL: CPT

## 2023-06-30 PROCEDURE — 85025 COMPLETE CBC W/AUTO DIFF WBC: CPT

## 2023-06-30 PROCEDURE — 73610 X-RAY EXAM OF ANKLE: CPT

## 2023-06-30 PROCEDURE — 2580000003 HC RX 258: Performed by: EMERGENCY MEDICINE

## 2023-06-30 PROCEDURE — 99285 EMERGENCY DEPT VISIT HI MDM: CPT

## 2023-06-30 PROCEDURE — 2580000003 HC RX 258: Performed by: INTERNAL MEDICINE

## 2023-06-30 PROCEDURE — 6370000000 HC RX 637 (ALT 250 FOR IP): Performed by: INTERNAL MEDICINE

## 2023-06-30 PROCEDURE — 96365 THER/PROPH/DIAG IV INF INIT: CPT

## 2023-06-30 PROCEDURE — 6360000002 HC RX W HCPCS: Performed by: INTERNAL MEDICINE

## 2023-06-30 PROCEDURE — 1200000000 HC SEMI PRIVATE

## 2023-06-30 PROCEDURE — 83605 ASSAY OF LACTIC ACID: CPT

## 2023-06-30 PROCEDURE — 6360000002 HC RX W HCPCS: Performed by: EMERGENCY MEDICINE

## 2023-06-30 RX ORDER — SODIUM CHLORIDE 9 MG/ML
INJECTION, SOLUTION INTRAVENOUS CONTINUOUS
Status: DISPENSED | OUTPATIENT
Start: 2023-06-30 | End: 2023-07-01

## 2023-06-30 RX ORDER — ONDANSETRON 4 MG/1
4 TABLET, ORALLY DISINTEGRATING ORAL EVERY 8 HOURS PRN
Status: DISCONTINUED | OUTPATIENT
Start: 2023-06-30 | End: 2023-07-07 | Stop reason: HOSPADM

## 2023-06-30 RX ORDER — HYDROCODONE BITARTRATE AND ACETAMINOPHEN 5; 325 MG/1; MG/1
1 TABLET ORAL EVERY 6 HOURS PRN
Status: DISCONTINUED | OUTPATIENT
Start: 2023-06-30 | End: 2023-07-07 | Stop reason: HOSPADM

## 2023-06-30 RX ORDER — SODIUM CHLORIDE 0.9 % (FLUSH) 0.9 %
5-40 SYRINGE (ML) INJECTION EVERY 12 HOURS SCHEDULED
Status: DISCONTINUED | OUTPATIENT
Start: 2023-06-30 | End: 2023-07-07 | Stop reason: HOSPADM

## 2023-06-30 RX ORDER — RISPERIDONE 0.5 MG/1
0.5 TABLET ORAL EVERY EVENING
Status: ON HOLD | COMMUNITY
End: 2023-07-07 | Stop reason: HOSPADM

## 2023-06-30 RX ORDER — ATORVASTATIN CALCIUM 10 MG/1
20 TABLET, FILM COATED ORAL NIGHTLY
Status: DISCONTINUED | OUTPATIENT
Start: 2023-06-30 | End: 2023-07-07 | Stop reason: HOSPADM

## 2023-06-30 RX ORDER — QUETIAPINE FUMARATE 25 MG/1
25 TABLET, FILM COATED ORAL NIGHTLY
Status: DISCONTINUED | OUTPATIENT
Start: 2023-06-30 | End: 2023-07-07 | Stop reason: HOSPADM

## 2023-06-30 RX ORDER — SODIUM CHLORIDE 9 MG/ML
INJECTION, SOLUTION INTRAVENOUS CONTINUOUS
Status: DISCONTINUED | OUTPATIENT
Start: 2023-06-30 | End: 2023-06-30

## 2023-06-30 RX ORDER — LORAZEPAM 1 MG/1
1 TABLET ORAL EVERY 12 HOURS PRN
Status: DISCONTINUED | OUTPATIENT
Start: 2023-06-30 | End: 2023-07-03

## 2023-06-30 RX ORDER — FERROUS SULFATE 325(65) MG
325 TABLET ORAL EVERY OTHER DAY
Status: DISCONTINUED | OUTPATIENT
Start: 2023-07-01 | End: 2023-07-07 | Stop reason: HOSPADM

## 2023-06-30 RX ORDER — POLYETHYLENE GLYCOL 3350 17 G/17G
17 POWDER, FOR SOLUTION ORAL DAILY PRN
Status: DISCONTINUED | OUTPATIENT
Start: 2023-06-30 | End: 2023-07-07 | Stop reason: HOSPADM

## 2023-06-30 RX ORDER — SODIUM CHLORIDE 9 MG/ML
500 INJECTION, SOLUTION INTRAVENOUS PRN
Status: DISCONTINUED | OUTPATIENT
Start: 2023-06-30 | End: 2023-07-07 | Stop reason: HOSPADM

## 2023-06-30 RX ORDER — ONDANSETRON 2 MG/ML
4 INJECTION INTRAMUSCULAR; INTRAVENOUS EVERY 6 HOURS PRN
Status: DISCONTINUED | OUTPATIENT
Start: 2023-06-30 | End: 2023-07-07 | Stop reason: HOSPADM

## 2023-06-30 RX ORDER — PANTOPRAZOLE SODIUM 40 MG/1
40 TABLET, DELAYED RELEASE ORAL DAILY
Status: DISCONTINUED | OUTPATIENT
Start: 2023-07-01 | End: 2023-07-07 | Stop reason: HOSPADM

## 2023-06-30 RX ORDER — LANOLIN ALCOHOL/MO/W.PET/CERES
3 CREAM (GRAM) TOPICAL NIGHTLY PRN
Status: DISCONTINUED | OUTPATIENT
Start: 2023-06-30 | End: 2023-07-07 | Stop reason: HOSPADM

## 2023-06-30 RX ORDER — RISPERIDONE 0.5 MG/1
0.5 TABLET ORAL EVERY EVENING
Status: DISCONTINUED | OUTPATIENT
Start: 2023-06-30 | End: 2023-07-07 | Stop reason: HOSPADM

## 2023-06-30 RX ORDER — SODIUM CHLORIDE 0.9 % (FLUSH) 0.9 %
5-40 SYRINGE (ML) INJECTION PRN
Status: DISCONTINUED | OUTPATIENT
Start: 2023-06-30 | End: 2023-07-07 | Stop reason: HOSPADM

## 2023-06-30 RX ORDER — LANOLIN ALCOHOL/MO/W.PET/CERES
1000 CREAM (GRAM) TOPICAL DAILY
Status: DISCONTINUED | OUTPATIENT
Start: 2023-07-01 | End: 2023-07-07 | Stop reason: HOSPADM

## 2023-06-30 RX ORDER — METOPROLOL SUCCINATE 25 MG/1
25 TABLET, EXTENDED RELEASE ORAL DAILY
Status: DISCONTINUED | OUTPATIENT
Start: 2023-07-01 | End: 2023-07-05

## 2023-06-30 RX ADMIN — CEFEPIME HYDROCHLORIDE 2000 MG: 2 INJECTION, POWDER, FOR SOLUTION INTRAVENOUS at 15:10

## 2023-06-30 RX ADMIN — APIXABAN 2.5 MG: 2.5 TABLET, FILM COATED ORAL at 21:50

## 2023-06-30 RX ADMIN — SODIUM CHLORIDE 500 ML: 9 INJECTION, SOLUTION INTRAVENOUS at 21:48

## 2023-06-30 RX ADMIN — QUETIAPINE FUMARATE 25 MG: 25 TABLET ORAL at 21:50

## 2023-06-30 RX ADMIN — SODIUM CHLORIDE: 9 INJECTION, SOLUTION INTRAVENOUS at 15:08

## 2023-06-30 RX ADMIN — RISPERIDONE 0.5 MG: 0.5 TABLET ORAL at 21:50

## 2023-06-30 RX ADMIN — CEFEPIME 1000 MG: 1 INJECTION, POWDER, FOR SOLUTION INTRAMUSCULAR; INTRAVENOUS at 21:49

## 2023-06-30 RX ADMIN — SODIUM CHLORIDE: 9 INJECTION, SOLUTION INTRAVENOUS at 21:45

## 2023-06-30 RX ADMIN — ATORVASTATIN CALCIUM 20 MG: 10 TABLET, FILM COATED ORAL at 21:50

## 2023-06-30 RX ADMIN — SODIUM CHLORIDE, PRESERVATIVE FREE 10 ML: 5 INJECTION INTRAVENOUS at 21:51

## 2023-06-30 ASSESSMENT — LIFESTYLE VARIABLES
HOW MANY STANDARD DRINKS CONTAINING ALCOHOL DO YOU HAVE ON A TYPICAL DAY: PATIENT DOES NOT DRINK
HOW OFTEN DO YOU HAVE A DRINK CONTAINING ALCOHOL: NEVER

## 2023-06-30 ASSESSMENT — PAIN - FUNCTIONAL ASSESSMENT: PAIN_FUNCTIONAL_ASSESSMENT: NONE - DENIES PAIN

## 2023-07-01 LAB
ANION GAP SERPL CALCULATED.3IONS-SCNC: 10 MMOL/L (ref 4–16)
BASOPHILS ABSOLUTE: 0.1 K/CU MM
BASOPHILS RELATIVE PERCENT: 0.9 % (ref 0–1)
BUN SERPL-MCNC: 36 MG/DL (ref 6–23)
CALCIUM SERPL-MCNC: 7.9 MG/DL (ref 8.3–10.6)
CHLORIDE BLD-SCNC: 111 MMOL/L (ref 99–110)
CO2: 21 MMOL/L (ref 21–32)
CREAT SERPL-MCNC: 1.2 MG/DL (ref 0.6–1.1)
DIFFERENTIAL TYPE: ABNORMAL
EOSINOPHILS ABSOLUTE: 0.2 K/CU MM
EOSINOPHILS RELATIVE PERCENT: 2.6 % (ref 0–3)
GFR SERPL CREATININE-BSD FRML MDRD: 42 ML/MIN/1.73M2
GLUCOSE SERPL-MCNC: 81 MG/DL (ref 70–99)
HCT VFR BLD CALC: 35.5 % (ref 37–47)
HEMOGLOBIN: 11 GM/DL (ref 12.5–16)
IMMATURE NEUTROPHIL %: 0.2 % (ref 0–0.43)
LYMPHOCYTES ABSOLUTE: 1.6 K/CU MM
LYMPHOCYTES RELATIVE PERCENT: 25.2 % (ref 24–44)
MCH RBC QN AUTO: 30.1 PG (ref 27–31)
MCHC RBC AUTO-ENTMCNC: 31 % (ref 32–36)
MCV RBC AUTO: 97 FL (ref 78–100)
MONOCYTES ABSOLUTE: 0.7 K/CU MM
MONOCYTES RELATIVE PERCENT: 10.1 % (ref 0–4)
NUCLEATED RBC %: 0 %
PDW BLD-RTO: 13.5 % (ref 11.7–14.9)
PLATELET # BLD: 156 K/CU MM (ref 140–440)
PMV BLD AUTO: 10.3 FL (ref 7.5–11.1)
POTASSIUM SERPL-SCNC: 4.6 MMOL/L (ref 3.5–5.1)
RBC # BLD: 3.66 M/CU MM (ref 4.2–5.4)
SEGMENTED NEUTROPHILS ABSOLUTE COUNT: 3.9 K/CU MM
SEGMENTED NEUTROPHILS RELATIVE PERCENT: 61 % (ref 36–66)
SODIUM BLD-SCNC: 142 MMOL/L (ref 135–145)
TOTAL IMMATURE NEUTOROPHIL: 0.01 K/CU MM
TOTAL NUCLEATED RBC: 0 K/CU MM
WBC # BLD: 6.4 K/CU MM (ref 4–10.5)

## 2023-07-01 PROCEDURE — 2580000003 HC RX 258: Performed by: INTERNAL MEDICINE

## 2023-07-01 PROCEDURE — 36415 COLL VENOUS BLD VENIPUNCTURE: CPT

## 2023-07-01 PROCEDURE — 6370000000 HC RX 637 (ALT 250 FOR IP): Performed by: INTERNAL MEDICINE

## 2023-07-01 PROCEDURE — 1200000000 HC SEMI PRIVATE

## 2023-07-01 PROCEDURE — 85025 COMPLETE CBC W/AUTO DIFF WBC: CPT

## 2023-07-01 PROCEDURE — 6360000002 HC RX W HCPCS: Performed by: INTERNAL MEDICINE

## 2023-07-01 PROCEDURE — 94761 N-INVAS EAR/PLS OXIMETRY MLT: CPT

## 2023-07-01 PROCEDURE — 80048 BASIC METABOLIC PNL TOTAL CA: CPT

## 2023-07-01 PROCEDURE — 6360000002 HC RX W HCPCS: Performed by: NURSE PRACTITIONER

## 2023-07-01 RX ORDER — LORAZEPAM 2 MG/ML
0.5 INJECTION INTRAMUSCULAR ONCE
Status: COMPLETED | OUTPATIENT
Start: 2023-07-01 | End: 2023-07-01

## 2023-07-01 RX ADMIN — CEFEPIME 1000 MG: 1 INJECTION, POWDER, FOR SOLUTION INTRAMUSCULAR; INTRAVENOUS at 09:13

## 2023-07-01 RX ADMIN — QUETIAPINE FUMARATE 25 MG: 25 TABLET ORAL at 22:15

## 2023-07-01 RX ADMIN — FERROUS SULFATE TAB 325 MG (65 MG ELEMENTAL FE) 325 MG: 325 (65 FE) TAB at 08:50

## 2023-07-01 RX ADMIN — APIXABAN 2.5 MG: 2.5 TABLET, FILM COATED ORAL at 22:15

## 2023-07-01 RX ADMIN — LORAZEPAM 1 MG: 1 TABLET ORAL at 22:15

## 2023-07-01 RX ADMIN — METOPROLOL SUCCINATE 25 MG: 25 TABLET, EXTENDED RELEASE ORAL at 08:50

## 2023-07-01 RX ADMIN — HYDROCODONE BITARTRATE AND ACETAMINOPHEN 1 TABLET: 5; 325 TABLET ORAL at 01:42

## 2023-07-01 RX ADMIN — APIXABAN 2.5 MG: 2.5 TABLET, FILM COATED ORAL at 08:50

## 2023-07-01 RX ADMIN — Medication 3 MG: at 22:15

## 2023-07-01 RX ADMIN — HYDROCODONE BITARTRATE AND ACETAMINOPHEN 1 TABLET: 5; 325 TABLET ORAL at 10:08

## 2023-07-01 RX ADMIN — PANTOPRAZOLE SODIUM 40 MG: 40 TABLET, DELAYED RELEASE ORAL at 08:50

## 2023-07-01 RX ADMIN — SODIUM CHLORIDE, PRESERVATIVE FREE 10 ML: 5 INJECTION INTRAVENOUS at 22:15

## 2023-07-01 RX ADMIN — ATORVASTATIN CALCIUM 20 MG: 10 TABLET, FILM COATED ORAL at 22:15

## 2023-07-01 RX ADMIN — Medication 3 MG: at 01:32

## 2023-07-01 RX ADMIN — CEFEPIME 1000 MG: 1 INJECTION, POWDER, FOR SOLUTION INTRAMUSCULAR; INTRAVENOUS at 22:20

## 2023-07-01 RX ADMIN — RISPERIDONE 0.5 MG: 0.5 TABLET ORAL at 17:02

## 2023-07-01 RX ADMIN — LORAZEPAM 1 MG: 1 TABLET ORAL at 10:08

## 2023-07-01 RX ADMIN — HYDROCODONE BITARTRATE AND ACETAMINOPHEN 1 TABLET: 5; 325 TABLET ORAL at 17:02

## 2023-07-01 RX ADMIN — SODIUM CHLORIDE, PRESERVATIVE FREE 10 ML: 5 INJECTION INTRAVENOUS at 08:52

## 2023-07-01 RX ADMIN — CYANOCOBALAMIN TAB 1000 MCG 1000 MCG: 1000 TAB at 08:50

## 2023-07-01 RX ADMIN — LORAZEPAM 0.5 MG: 2 INJECTION INTRAMUSCULAR; INTRAVENOUS at 22:54

## 2023-07-01 ASSESSMENT — PAIN SCALES - GENERAL
PAINLEVEL_OUTOF10: 4
PAINLEVEL_OUTOF10: 7
PAINLEVEL_OUTOF10: 6
PAINLEVEL_OUTOF10: 0
PAINLEVEL_OUTOF10: 0
PAINLEVEL_OUTOF10: 2
PAINLEVEL_OUTOF10: 0
PAINLEVEL_OUTOF10: 2
PAINLEVEL_OUTOF10: 5

## 2023-07-01 ASSESSMENT — PAIN DESCRIPTION - ORIENTATION
ORIENTATION: RIGHT;LEFT
ORIENTATION: LEFT;RIGHT

## 2023-07-01 ASSESSMENT — PAIN DESCRIPTION - LOCATION
LOCATION: LEG
LOCATION: LEG;BACK
LOCATION: BACK;LEG
LOCATION: BACK

## 2023-07-01 ASSESSMENT — PAIN SCALES - WONG BAKER
WONGBAKER_NUMERICALRESPONSE: 2
WONGBAKER_NUMERICALRESPONSE: 0
WONGBAKER_NUMERICALRESPONSE: 2
WONGBAKER_NUMERICALRESPONSE: 0

## 2023-07-01 ASSESSMENT — PAIN DESCRIPTION - DESCRIPTORS
DESCRIPTORS: THROBBING
DESCRIPTORS: ACHING;SORE
DESCRIPTORS: ACHING;THROBBING

## 2023-07-01 ASSESSMENT — PAIN - FUNCTIONAL ASSESSMENT
PAIN_FUNCTIONAL_ASSESSMENT: ACTIVITIES ARE NOT PREVENTED
PAIN_FUNCTIONAL_ASSESSMENT: ACTIVITIES ARE NOT PREVENTED

## 2023-07-01 ASSESSMENT — PAIN SCALES - PAIN ASSESSMENT IN ADVANCED DEMENTIA (PAINAD)
CONSOLABILITY: 1
FACIALEXPRESSION: 1
NEGVOCALIZATION: 1
BREATHING: 0
BODYLANGUAGE: 1
TOTALSCORE: 4

## 2023-07-02 LAB
ANION GAP SERPL CALCULATED.3IONS-SCNC: 11 MMOL/L (ref 4–16)
BASOPHILS ABSOLUTE: 0.1 K/CU MM
BASOPHILS RELATIVE PERCENT: 1 % (ref 0–1)
BUN SERPL-MCNC: 27 MG/DL (ref 6–23)
CALCIUM SERPL-MCNC: 8.7 MG/DL (ref 8.3–10.6)
CHLORIDE BLD-SCNC: 109 MMOL/L (ref 99–110)
CO2: 23 MMOL/L (ref 21–32)
CREAT SERPL-MCNC: 1.1 MG/DL (ref 0.6–1.1)
DIFFERENTIAL TYPE: ABNORMAL
EOSINOPHILS ABSOLUTE: 0.2 K/CU MM
EOSINOPHILS RELATIVE PERCENT: 4.9 % (ref 0–3)
GFR SERPL CREATININE-BSD FRML MDRD: 46 ML/MIN/1.73M2
GLUCOSE SERPL-MCNC: 90 MG/DL (ref 70–99)
HCT VFR BLD CALC: 39.3 % (ref 37–47)
HEMOGLOBIN: 12.3 GM/DL (ref 12.5–16)
IMMATURE NEUTROPHIL %: 0.2 % (ref 0–0.43)
LYMPHOCYTES ABSOLUTE: 1.4 K/CU MM
LYMPHOCYTES RELATIVE PERCENT: 27.8 % (ref 24–44)
MCH RBC QN AUTO: 30.1 PG (ref 27–31)
MCHC RBC AUTO-ENTMCNC: 31.3 % (ref 32–36)
MCV RBC AUTO: 96.3 FL (ref 78–100)
MONOCYTES ABSOLUTE: 0.5 K/CU MM
MONOCYTES RELATIVE PERCENT: 10.9 % (ref 0–4)
NUCLEATED RBC %: 0 %
PDW BLD-RTO: 13.5 % (ref 11.7–14.9)
PLATELET # BLD: 157 K/CU MM (ref 140–440)
PMV BLD AUTO: 10.5 FL (ref 7.5–11.1)
POTASSIUM SERPL-SCNC: 4.7 MMOL/L (ref 3.5–5.1)
RBC # BLD: 4.08 M/CU MM (ref 4.2–5.4)
SEGMENTED NEUTROPHILS ABSOLUTE COUNT: 2.7 K/CU MM
SEGMENTED NEUTROPHILS RELATIVE PERCENT: 55.2 % (ref 36–66)
SODIUM BLD-SCNC: 143 MMOL/L (ref 135–145)
TOTAL IMMATURE NEUTOROPHIL: 0.01 K/CU MM
TOTAL NUCLEATED RBC: 0 K/CU MM
WBC # BLD: 4.9 K/CU MM (ref 4–10.5)

## 2023-07-02 PROCEDURE — 2580000003 HC RX 258: Performed by: INTERNAL MEDICINE

## 2023-07-02 PROCEDURE — 6370000000 HC RX 637 (ALT 250 FOR IP): Performed by: INTERNAL MEDICINE

## 2023-07-02 PROCEDURE — 6370000000 HC RX 637 (ALT 250 FOR IP): Performed by: NURSE PRACTITIONER

## 2023-07-02 PROCEDURE — 85025 COMPLETE CBC W/AUTO DIFF WBC: CPT

## 2023-07-02 PROCEDURE — 80048 BASIC METABOLIC PNL TOTAL CA: CPT

## 2023-07-02 PROCEDURE — 36415 COLL VENOUS BLD VENIPUNCTURE: CPT

## 2023-07-02 PROCEDURE — 1200000000 HC SEMI PRIVATE

## 2023-07-02 PROCEDURE — 94761 N-INVAS EAR/PLS OXIMETRY MLT: CPT

## 2023-07-02 PROCEDURE — 6360000002 HC RX W HCPCS: Performed by: INTERNAL MEDICINE

## 2023-07-02 RX ORDER — AMLODIPINE BESYLATE 5 MG/1
5 TABLET ORAL DAILY
Status: DISCONTINUED | OUTPATIENT
Start: 2023-07-02 | End: 2023-07-04

## 2023-07-02 RX ORDER — TRAZODONE HYDROCHLORIDE 50 MG/1
50 TABLET ORAL ONCE
Status: COMPLETED | OUTPATIENT
Start: 2023-07-02 | End: 2023-07-02

## 2023-07-02 RX ADMIN — SODIUM CHLORIDE, PRESERVATIVE FREE 10 ML: 5 INJECTION INTRAVENOUS at 20:40

## 2023-07-02 RX ADMIN — PANTOPRAZOLE SODIUM 40 MG: 40 TABLET, DELAYED RELEASE ORAL at 10:04

## 2023-07-02 RX ADMIN — CEFEPIME 1000 MG: 1 INJECTION, POWDER, FOR SOLUTION INTRAMUSCULAR; INTRAVENOUS at 20:58

## 2023-07-02 RX ADMIN — SODIUM CHLORIDE, PRESERVATIVE FREE 10 ML: 5 INJECTION INTRAVENOUS at 10:05

## 2023-07-02 RX ADMIN — LORAZEPAM 1 MG: 1 TABLET ORAL at 17:16

## 2023-07-02 RX ADMIN — HYDROCODONE BITARTRATE AND ACETAMINOPHEN 1 TABLET: 5; 325 TABLET ORAL at 20:39

## 2023-07-02 RX ADMIN — CYANOCOBALAMIN TAB 1000 MCG 1000 MCG: 1000 TAB at 10:04

## 2023-07-02 RX ADMIN — ATORVASTATIN CALCIUM 20 MG: 10 TABLET, FILM COATED ORAL at 20:39

## 2023-07-02 RX ADMIN — QUETIAPINE FUMARATE 25 MG: 25 TABLET ORAL at 20:39

## 2023-07-02 RX ADMIN — CEFEPIME 1000 MG: 1 INJECTION, POWDER, FOR SOLUTION INTRAMUSCULAR; INTRAVENOUS at 10:03

## 2023-07-02 RX ADMIN — APIXABAN 2.5 MG: 2.5 TABLET, FILM COATED ORAL at 10:05

## 2023-07-02 RX ADMIN — RISPERIDONE 0.5 MG: 0.5 TABLET ORAL at 17:16

## 2023-07-02 RX ADMIN — AMLODIPINE BESYLATE 5 MG: 5 TABLET ORAL at 16:18

## 2023-07-02 RX ADMIN — METOPROLOL SUCCINATE 25 MG: 25 TABLET, EXTENDED RELEASE ORAL at 10:04

## 2023-07-02 RX ADMIN — APIXABAN 2.5 MG: 2.5 TABLET, FILM COATED ORAL at 20:39

## 2023-07-02 RX ADMIN — HYDROCODONE BITARTRATE AND ACETAMINOPHEN 1 TABLET: 5; 325 TABLET ORAL at 02:12

## 2023-07-02 RX ADMIN — TRAZODONE HYDROCHLORIDE 50 MG: 50 TABLET ORAL at 03:37

## 2023-07-02 ASSESSMENT — PAIN DESCRIPTION - LOCATION: LOCATION: LEG

## 2023-07-02 ASSESSMENT — PAIN SCALES - PAIN ASSESSMENT IN ADVANCED DEMENTIA (PAINAD)
FACIALEXPRESSION: 1
BREATHING: 0
NEGVOCALIZATION: 1
BODYLANGUAGE: 1
NEGVOCALIZATION: 1
BODYLANGUAGE: 1
CONSOLABILITY: 2
NEGVOCALIZATION: 1
BREATHING: 0
TOTALSCORE: 5
CONSOLABILITY: 2
BODYLANGUAGE: 1
FACIALEXPRESSION: 1
TOTALSCORE: 5
CONSOLABILITY: 2
TOTALSCORE: 6
FACIALEXPRESSION: 2
BREATHING: 0

## 2023-07-02 ASSESSMENT — PAIN DESCRIPTION - ORIENTATION: ORIENTATION: RIGHT;LEFT

## 2023-07-02 ASSESSMENT — PAIN DESCRIPTION - FREQUENCY: FREQUENCY: CONTINUOUS

## 2023-07-02 ASSESSMENT — PAIN DESCRIPTION - ONSET: ONSET: ON-GOING

## 2023-07-02 ASSESSMENT — PAIN SCALES - GENERAL
PAINLEVEL_OUTOF10: 0
PAINLEVEL_OUTOF10: 4

## 2023-07-02 ASSESSMENT — PAIN DESCRIPTION - DESCRIPTORS: DESCRIPTORS: ACHING

## 2023-07-02 ASSESSMENT — PAIN - FUNCTIONAL ASSESSMENT: PAIN_FUNCTIONAL_ASSESSMENT: PREVENTS OR INTERFERES SOME ACTIVE ACTIVITIES AND ADLS

## 2023-07-02 ASSESSMENT — PAIN SCALES - WONG BAKER
WONGBAKER_NUMERICALRESPONSE: 2
WONGBAKER_NUMERICALRESPONSE: 0

## 2023-07-03 LAB
ANION GAP SERPL CALCULATED.3IONS-SCNC: 11 MMOL/L (ref 4–16)
BASOPHILS ABSOLUTE: 0.1 K/CU MM
BASOPHILS RELATIVE PERCENT: 1 % (ref 0–1)
BUN SERPL-MCNC: 24 MG/DL (ref 6–23)
CALCIUM SERPL-MCNC: 8.4 MG/DL (ref 8.3–10.6)
CHLORIDE BLD-SCNC: 109 MMOL/L (ref 99–110)
CO2: 20 MMOL/L (ref 21–32)
CREAT SERPL-MCNC: 1.1 MG/DL (ref 0.6–1.1)
DIFFERENTIAL TYPE: ABNORMAL
EOSINOPHILS ABSOLUTE: 0.3 K/CU MM
EOSINOPHILS RELATIVE PERCENT: 4.8 % (ref 0–3)
GFR SERPL CREATININE-BSD FRML MDRD: 46 ML/MIN/1.73M2
GLUCOSE SERPL-MCNC: 94 MG/DL (ref 70–99)
HCT VFR BLD CALC: 38.9 % (ref 37–47)
HEMOGLOBIN: 12.5 GM/DL (ref 12.5–16)
IMMATURE NEUTROPHIL %: 0.3 % (ref 0–0.43)
LYMPHOCYTES ABSOLUTE: 1.3 K/CU MM
LYMPHOCYTES RELATIVE PERCENT: 21.9 % (ref 24–44)
MCH RBC QN AUTO: 30.3 PG (ref 27–31)
MCHC RBC AUTO-ENTMCNC: 32.1 % (ref 32–36)
MCV RBC AUTO: 94.2 FL (ref 78–100)
MONOCYTES ABSOLUTE: 0.5 K/CU MM
MONOCYTES RELATIVE PERCENT: 8.6 % (ref 0–4)
NUCLEATED RBC %: 0 %
PDW BLD-RTO: 13.5 % (ref 11.7–14.9)
PLATELET # BLD: 168 K/CU MM (ref 140–440)
PMV BLD AUTO: 10.5 FL (ref 7.5–11.1)
POTASSIUM SERPL-SCNC: 4.2 MMOL/L (ref 3.5–5.1)
RBC # BLD: 4.13 M/CU MM (ref 4.2–5.4)
SEGMENTED NEUTROPHILS ABSOLUTE COUNT: 3.7 K/CU MM
SEGMENTED NEUTROPHILS RELATIVE PERCENT: 63.4 % (ref 36–66)
SODIUM BLD-SCNC: 140 MMOL/L (ref 135–145)
TOTAL IMMATURE NEUTOROPHIL: 0.02 K/CU MM
TOTAL NUCLEATED RBC: 0 K/CU MM
WBC # BLD: 5.8 K/CU MM (ref 4–10.5)

## 2023-07-03 PROCEDURE — 80048 BASIC METABOLIC PNL TOTAL CA: CPT

## 2023-07-03 PROCEDURE — 36415 COLL VENOUS BLD VENIPUNCTURE: CPT

## 2023-07-03 PROCEDURE — 6360000002 HC RX W HCPCS: Performed by: INTERNAL MEDICINE

## 2023-07-03 PROCEDURE — 85025 COMPLETE CBC W/AUTO DIFF WBC: CPT

## 2023-07-03 PROCEDURE — 6370000000 HC RX 637 (ALT 250 FOR IP): Performed by: INTERNAL MEDICINE

## 2023-07-03 PROCEDURE — 94761 N-INVAS EAR/PLS OXIMETRY MLT: CPT

## 2023-07-03 PROCEDURE — 99211 OFF/OP EST MAY X REQ PHY/QHP: CPT

## 2023-07-03 PROCEDURE — 92610 EVALUATE SWALLOWING FUNCTION: CPT | Performed by: SPEECH-LANGUAGE PATHOLOGIST

## 2023-07-03 PROCEDURE — 2580000003 HC RX 258: Performed by: INTERNAL MEDICINE

## 2023-07-03 PROCEDURE — 99223 1ST HOSP IP/OBS HIGH 75: CPT | Performed by: INTERNAL MEDICINE

## 2023-07-03 PROCEDURE — 1200000000 HC SEMI PRIVATE

## 2023-07-03 RX ORDER — LORAZEPAM 0.5 MG/1
0.5 TABLET ORAL EVERY 12 HOURS PRN
Status: DISCONTINUED | OUTPATIENT
Start: 2023-07-03 | End: 2023-07-05

## 2023-07-03 RX ORDER — DEXTROSE AND SODIUM CHLORIDE 5; .9 G/100ML; G/100ML
INJECTION, SOLUTION INTRAVENOUS CONTINUOUS
Status: DISCONTINUED | OUTPATIENT
Start: 2023-07-03 | End: 2023-07-07 | Stop reason: HOSPADM

## 2023-07-03 RX ADMIN — ATORVASTATIN CALCIUM 20 MG: 10 TABLET, FILM COATED ORAL at 21:11

## 2023-07-03 RX ADMIN — CYANOCOBALAMIN TAB 1000 MCG 1000 MCG: 1000 TAB at 08:31

## 2023-07-03 RX ADMIN — PANTOPRAZOLE SODIUM 40 MG: 40 TABLET, DELAYED RELEASE ORAL at 08:31

## 2023-07-03 RX ADMIN — Medication 3 MG: at 21:11

## 2023-07-03 RX ADMIN — LORAZEPAM 1 MG: 1 TABLET ORAL at 05:11

## 2023-07-03 RX ADMIN — CEFEPIME 1000 MG: 1 INJECTION, POWDER, FOR SOLUTION INTRAMUSCULAR; INTRAVENOUS at 22:02

## 2023-07-03 RX ADMIN — APIXABAN 2.5 MG: 2.5 TABLET, FILM COATED ORAL at 21:10

## 2023-07-03 RX ADMIN — FERROUS SULFATE TAB 325 MG (65 MG ELEMENTAL FE) 325 MG: 325 (65 FE) TAB at 08:31

## 2023-07-03 RX ADMIN — DEXTROSE AND SODIUM CHLORIDE: 5; 900 INJECTION, SOLUTION INTRAVENOUS at 08:19

## 2023-07-03 RX ADMIN — CEFEPIME 1000 MG: 1 INJECTION, POWDER, FOR SOLUTION INTRAMUSCULAR; INTRAVENOUS at 08:38

## 2023-07-03 RX ADMIN — COLLAGENASE SANTYL: 250 OINTMENT TOPICAL at 12:18

## 2023-07-03 RX ADMIN — APIXABAN 2.5 MG: 2.5 TABLET, FILM COATED ORAL at 08:31

## 2023-07-03 RX ADMIN — METOPROLOL SUCCINATE 25 MG: 25 TABLET, EXTENDED RELEASE ORAL at 08:31

## 2023-07-03 RX ADMIN — SODIUM CHLORIDE, PRESERVATIVE FREE 10 ML: 5 INJECTION INTRAVENOUS at 08:31

## 2023-07-03 RX ADMIN — AMLODIPINE BESYLATE 5 MG: 5 TABLET ORAL at 08:31

## 2023-07-03 ASSESSMENT — PAIN SCALES - PAIN ASSESSMENT IN ADVANCED DEMENTIA (PAINAD)
BREATHING: 0
FACIALEXPRESSION: 0
FACIALEXPRESSION: 0
BREATHING: 0
CONSOLABILITY: 0
CONSOLABILITY: 0
TOTALSCORE: 0
FACIALEXPRESSION: 0
TOTALSCORE: 0
BODYLANGUAGE: 0
BREATHING: 0
BREATHING: 0
CONSOLABILITY: 0
CONSOLABILITY: 0
TOTALSCORE: 0
FACIALEXPRESSION: 0
BODYLANGUAGE: 0
NEGVOCALIZATION: 0
NEGVOCALIZATION: 0
TOTALSCORE: 0
NEGVOCALIZATION: 0
BODYLANGUAGE: 0
BODYLANGUAGE: 0
NEGVOCALIZATION: 0

## 2023-07-03 ASSESSMENT — PAIN DESCRIPTION - DESCRIPTORS: DESCRIPTORS: ACHING

## 2023-07-03 ASSESSMENT — PAIN DESCRIPTION - LOCATION: LOCATION: GENERALIZED

## 2023-07-03 ASSESSMENT — PAIN DESCRIPTION - FREQUENCY: FREQUENCY: CONTINUOUS

## 2023-07-03 ASSESSMENT — PAIN - FUNCTIONAL ASSESSMENT: PAIN_FUNCTIONAL_ASSESSMENT: PREVENTS OR INTERFERES WITH MANY ACTIVE NOT PASSIVE ACTIVITIES

## 2023-07-03 ASSESSMENT — PAIN SCALES - WONG BAKER: WONGBAKER_NUMERICALRESPONSE: 4

## 2023-07-03 ASSESSMENT — PAIN SCALES - GENERAL
PAINLEVEL_OUTOF10: 4
PAINLEVEL_OUTOF10: 0

## 2023-07-03 ASSESSMENT — PAIN DESCRIPTION - ONSET: ONSET: ON-GOING

## 2023-07-04 LAB
ANION GAP SERPL CALCULATED.3IONS-SCNC: 13 MMOL/L (ref 4–16)
BASOPHILS ABSOLUTE: 0.1 K/CU MM
BASOPHILS RELATIVE PERCENT: 0.8 % (ref 0–1)
BUN SERPL-MCNC: 19 MG/DL (ref 6–23)
CALCIUM SERPL-MCNC: 8.6 MG/DL (ref 8.3–10.6)
CHLORIDE BLD-SCNC: 111 MMOL/L (ref 99–110)
CO2: 18 MMOL/L (ref 21–32)
CREAT SERPL-MCNC: 1 MG/DL (ref 0.6–1.1)
DIFFERENTIAL TYPE: ABNORMAL
EOSINOPHILS ABSOLUTE: 0.2 K/CU MM
EOSINOPHILS RELATIVE PERCENT: 2.8 % (ref 0–3)
GFR SERPL CREATININE-BSD FRML MDRD: 52 ML/MIN/1.73M2
GLUCOSE SERPL-MCNC: 125 MG/DL (ref 70–99)
HCT VFR BLD CALC: 39.5 % (ref 37–47)
HEMOGLOBIN: 12.3 GM/DL (ref 12.5–16)
IMMATURE NEUTROPHIL %: 0.1 % (ref 0–0.43)
LYMPHOCYTES ABSOLUTE: 1.2 K/CU MM
LYMPHOCYTES RELATIVE PERCENT: 17.3 % (ref 24–44)
MCH RBC QN AUTO: 29.7 PG (ref 27–31)
MCHC RBC AUTO-ENTMCNC: 31.1 % (ref 32–36)
MCV RBC AUTO: 95.4 FL (ref 78–100)
MONOCYTES ABSOLUTE: 0.6 K/CU MM
MONOCYTES RELATIVE PERCENT: 8.1 % (ref 0–4)
NUCLEATED RBC %: 0 %
PDW BLD-RTO: 13.2 % (ref 11.7–14.9)
PLATELET # BLD: 152 K/CU MM (ref 140–440)
PMV BLD AUTO: 10.1 FL (ref 7.5–11.1)
POTASSIUM SERPL-SCNC: 4.1 MMOL/L (ref 3.5–5.1)
RBC # BLD: 4.14 M/CU MM (ref 4.2–5.4)
SEGMENTED NEUTROPHILS ABSOLUTE COUNT: 5.1 K/CU MM
SEGMENTED NEUTROPHILS RELATIVE PERCENT: 70.9 % (ref 36–66)
SODIUM BLD-SCNC: 142 MMOL/L (ref 135–145)
TOTAL IMMATURE NEUTOROPHIL: 0.01 K/CU MM
TOTAL NUCLEATED RBC: 0 K/CU MM
WBC # BLD: 7.2 K/CU MM (ref 4–10.5)

## 2023-07-04 PROCEDURE — 6360000002 HC RX W HCPCS: Performed by: INTERNAL MEDICINE

## 2023-07-04 PROCEDURE — 80048 BASIC METABOLIC PNL TOTAL CA: CPT

## 2023-07-04 PROCEDURE — 6370000000 HC RX 637 (ALT 250 FOR IP): Performed by: FAMILY MEDICINE

## 2023-07-04 PROCEDURE — 85025 COMPLETE CBC W/AUTO DIFF WBC: CPT

## 2023-07-04 PROCEDURE — 6370000000 HC RX 637 (ALT 250 FOR IP): Performed by: INTERNAL MEDICINE

## 2023-07-04 PROCEDURE — 2580000003 HC RX 258: Performed by: INTERNAL MEDICINE

## 2023-07-04 PROCEDURE — 6370000000 HC RX 637 (ALT 250 FOR IP): Performed by: STUDENT IN AN ORGANIZED HEALTH CARE EDUCATION/TRAINING PROGRAM

## 2023-07-04 PROCEDURE — 94761 N-INVAS EAR/PLS OXIMETRY MLT: CPT

## 2023-07-04 PROCEDURE — 1200000000 HC SEMI PRIVATE

## 2023-07-04 PROCEDURE — 36415 COLL VENOUS BLD VENIPUNCTURE: CPT

## 2023-07-04 RX ORDER — FUROSEMIDE 40 MG/1
40 TABLET ORAL DAILY
Status: DISCONTINUED | OUTPATIENT
Start: 2023-07-04 | End: 2023-07-07 | Stop reason: HOSPADM

## 2023-07-04 RX ORDER — AMLODIPINE BESYLATE 10 MG/1
10 TABLET ORAL DAILY
Status: DISCONTINUED | OUTPATIENT
Start: 2023-07-04 | End: 2023-07-07 | Stop reason: HOSPADM

## 2023-07-04 RX ORDER — LABETALOL HYDROCHLORIDE 5 MG/ML
10 INJECTION, SOLUTION INTRAVENOUS EVERY 4 HOURS PRN
Status: DISCONTINUED | OUTPATIENT
Start: 2023-07-04 | End: 2023-07-07 | Stop reason: HOSPADM

## 2023-07-04 RX ADMIN — PANTOPRAZOLE SODIUM 40 MG: 40 TABLET, DELAYED RELEASE ORAL at 09:37

## 2023-07-04 RX ADMIN — HYDROCODONE BITARTRATE AND ACETAMINOPHEN 1 TABLET: 5; 325 TABLET ORAL at 05:04

## 2023-07-04 RX ADMIN — APIXABAN 2.5 MG: 2.5 TABLET, FILM COATED ORAL at 09:37

## 2023-07-04 RX ADMIN — Medication 3 MG: at 21:11

## 2023-07-04 RX ADMIN — ONDANSETRON 4 MG: 2 INJECTION INTRAMUSCULAR; INTRAVENOUS at 02:14

## 2023-07-04 RX ADMIN — COLLAGENASE SANTYL: 250 OINTMENT TOPICAL at 11:13

## 2023-07-04 RX ADMIN — METOPROLOL SUCCINATE 25 MG: 25 TABLET, EXTENDED RELEASE ORAL at 09:37

## 2023-07-04 RX ADMIN — APIXABAN 2.5 MG: 2.5 TABLET, FILM COATED ORAL at 21:11

## 2023-07-04 RX ADMIN — CYANOCOBALAMIN TAB 1000 MCG 1000 MCG: 1000 TAB at 09:37

## 2023-07-04 RX ADMIN — ATORVASTATIN CALCIUM 20 MG: 10 TABLET, FILM COATED ORAL at 21:11

## 2023-07-04 RX ADMIN — FUROSEMIDE 40 MG: 40 TABLET ORAL at 22:38

## 2023-07-04 RX ADMIN — CEFEPIME 1000 MG: 1 INJECTION, POWDER, FOR SOLUTION INTRAMUSCULAR; INTRAVENOUS at 09:28

## 2023-07-04 RX ADMIN — HYDROCODONE BITARTRATE AND ACETAMINOPHEN 1 TABLET: 5; 325 TABLET ORAL at 21:11

## 2023-07-04 RX ADMIN — DEXTROSE AND SODIUM CHLORIDE: 5; 900 INJECTION, SOLUTION INTRAVENOUS at 01:53

## 2023-07-04 RX ADMIN — AMLODIPINE BESYLATE 10 MG: 10 TABLET ORAL at 09:37

## 2023-07-04 ASSESSMENT — PAIN SCALES - PAIN ASSESSMENT IN ADVANCED DEMENTIA (PAINAD)
NEGVOCALIZATION: 1
BODYLANGUAGE: 0
FACIALEXPRESSION: 0
BODYLANGUAGE: 0
NEGVOCALIZATION: 0
NEGVOCALIZATION: 0
CONSOLABILITY: 0
BODYLANGUAGE: 0
TOTALSCORE: 3
BREATHING: 0
BREATHING: 0
CONSOLABILITY: 0
BODYLANGUAGE: 0
TOTALSCORE: 0
BREATHING: 0
NEGVOCALIZATION: 0
TOTALSCORE: 0
CONSOLABILITY: 1
BODYLANGUAGE: 0
BREATHING: 0
TOTALSCORE: 0
FACIALEXPRESSION: 0
BREATHING: 1
TOTALSCORE: 0
CONSOLABILITY: 0
BREATHING: 0
NEGVOCALIZATION: 0
NEGVOCALIZATION: 0
BODYLANGUAGE: 0
FACIALEXPRESSION: 0
BODYLANGUAGE: 1
NEGVOCALIZATION: 1
FACIALEXPRESSION: 1
BODYLANGUAGE: 0
FACIALEXPRESSION: 0
BREATHING: 0
FACIALEXPRESSION: 0
CONSOLABILITY: 0
TOTALSCORE: 0
NEGVOCALIZATION: 1
NEGVOCALIZATION: 0
FACIALEXPRESSION: 0
BREATHING: 1
CONSOLABILITY: 0
FACIALEXPRESSION: 1
TOTALSCORE: 0
CONSOLABILITY: 0
NEGVOCALIZATION: 0
FACIALEXPRESSION: 0
CONSOLABILITY: 0
BODYLANGUAGE: 0
FACIALEXPRESSION: 0
BODYLANGUAGE: 0
TOTALSCORE: 3
BREATHING: 0
TOTALSCORE: 3
BREATHING: 0
TOTALSCORE: 0

## 2023-07-04 ASSESSMENT — PAIN SCALES - GENERAL
PAINLEVEL_OUTOF10: 0
PAINLEVEL_OUTOF10: 0

## 2023-07-05 ENCOUNTER — APPOINTMENT (OUTPATIENT)
Dept: CT IMAGING | Age: 88
DRG: 602 | End: 2023-07-05
Payer: MEDICARE

## 2023-07-05 ENCOUNTER — APPOINTMENT (OUTPATIENT)
Dept: GENERAL RADIOLOGY | Age: 88
DRG: 602 | End: 2023-07-05
Payer: MEDICARE

## 2023-07-05 PROBLEM — L08.9 WOUND INFECTION: Status: ACTIVE | Noted: 2023-07-05

## 2023-07-05 PROBLEM — T14.8XXA WOUND INFECTION: Status: ACTIVE | Noted: 2023-07-05

## 2023-07-05 PROBLEM — F05 DELIRIUM DUE TO MEDICAL CONDITION WITHOUT BEHAVIORAL DISTURBANCE: Status: ACTIVE | Noted: 2023-07-05

## 2023-07-05 LAB
ALBUMIN SERPL-MCNC: 3.2 GM/DL (ref 3.4–5)
ALP BLD-CCNC: 69 IU/L (ref 40–128)
ALT SERPL-CCNC: 8 U/L (ref 10–40)
AMMONIA: 12 UMOL/L (ref 11–51)
ANION GAP SERPL CALCULATED.3IONS-SCNC: 13 MMOL/L (ref 4–16)
AST SERPL-CCNC: 16 IU/L (ref 15–37)
BASOPHILS ABSOLUTE: 0.1 K/CU MM
BASOPHILS RELATIVE PERCENT: 0.7 % (ref 0–1)
BILIRUB SERPL-MCNC: 0.5 MG/DL (ref 0–1)
BUN SERPL-MCNC: 17 MG/DL (ref 6–23)
CALCIUM SERPL-MCNC: 8.9 MG/DL (ref 8.3–10.6)
CHLORIDE BLD-SCNC: 111 MMOL/L (ref 99–110)
CO2: 19 MMOL/L (ref 21–32)
CREAT SERPL-MCNC: 1 MG/DL (ref 0.6–1.1)
DIFFERENTIAL TYPE: ABNORMAL
EOSINOPHILS ABSOLUTE: 0.2 K/CU MM
EOSINOPHILS RELATIVE PERCENT: 1.8 % (ref 0–3)
GFR SERPL CREATININE-BSD FRML MDRD: 52 ML/MIN/1.73M2
GLUCOSE SERPL-MCNC: 136 MG/DL (ref 70–99)
HCT VFR BLD CALC: 44.4 % (ref 37–47)
HEMOGLOBIN: 13.5 GM/DL (ref 12.5–16)
IMMATURE NEUTROPHIL %: 0.3 % (ref 0–0.43)
LYMPHOCYTES ABSOLUTE: 2.4 K/CU MM
LYMPHOCYTES RELATIVE PERCENT: 21.3 % (ref 24–44)
MCH RBC QN AUTO: 29.3 PG (ref 27–31)
MCHC RBC AUTO-ENTMCNC: 30.4 % (ref 32–36)
MCV RBC AUTO: 96.3 FL (ref 78–100)
MONOCYTES ABSOLUTE: 0.8 K/CU MM
MONOCYTES RELATIVE PERCENT: 6.7 % (ref 0–4)
NUCLEATED RBC %: 0 %
PDW BLD-RTO: 13.3 % (ref 11.7–14.9)
PLATELET # BLD: 189 K/CU MM (ref 140–440)
PMV BLD AUTO: 10 FL (ref 7.5–11.1)
POTASSIUM SERPL-SCNC: 3.9 MMOL/L (ref 3.5–5.1)
RBC # BLD: 4.61 M/CU MM (ref 4.2–5.4)
SEGMENTED NEUTROPHILS ABSOLUTE COUNT: 7.9 K/CU MM
SEGMENTED NEUTROPHILS RELATIVE PERCENT: 69.2 % (ref 36–66)
SODIUM BLD-SCNC: 143 MMOL/L (ref 135–145)
TOTAL IMMATURE NEUTOROPHIL: 0.03 K/CU MM
TOTAL NUCLEATED RBC: 0 K/CU MM
TOTAL PROTEIN: 6 GM/DL (ref 6.4–8.2)
WBC # BLD: 11.4 K/CU MM (ref 4–10.5)

## 2023-07-05 PROCEDURE — 85025 COMPLETE CBC W/AUTO DIFF WBC: CPT

## 2023-07-05 PROCEDURE — 71045 X-RAY EXAM CHEST 1 VIEW: CPT

## 2023-07-05 PROCEDURE — 80053 COMPREHEN METABOLIC PANEL: CPT

## 2023-07-05 PROCEDURE — 2580000003 HC RX 258: Performed by: INTERNAL MEDICINE

## 2023-07-05 PROCEDURE — 51701 INSERT BLADDER CATHETER: CPT

## 2023-07-05 PROCEDURE — 05HB33Z INSERTION OF INFUSION DEVICE INTO RIGHT BASILIC VEIN, PERCUTANEOUS APPROACH: ICD-10-PCS | Performed by: STUDENT IN AN ORGANIZED HEALTH CARE EDUCATION/TRAINING PROGRAM

## 2023-07-05 PROCEDURE — 6360000002 HC RX W HCPCS: Performed by: INTERNAL MEDICINE

## 2023-07-05 PROCEDURE — 99232 SBSQ HOSP IP/OBS MODERATE 35: CPT | Performed by: INTERNAL MEDICINE

## 2023-07-05 PROCEDURE — 6360000002 HC RX W HCPCS: Performed by: NURSE PRACTITIONER

## 2023-07-05 PROCEDURE — 70450 CT HEAD/BRAIN W/O DYE: CPT

## 2023-07-05 PROCEDURE — 76937 US GUIDE VASCULAR ACCESS: CPT

## 2023-07-05 PROCEDURE — 94761 N-INVAS EAR/PLS OXIMETRY MLT: CPT

## 2023-07-05 PROCEDURE — 1200000000 HC SEMI PRIVATE

## 2023-07-05 PROCEDURE — 6370000000 HC RX 637 (ALT 250 FOR IP): Performed by: INTERNAL MEDICINE

## 2023-07-05 PROCEDURE — 6370000000 HC RX 637 (ALT 250 FOR IP): Performed by: NURSE PRACTITIONER

## 2023-07-05 PROCEDURE — 36410 VNPNXR 3YR/> PHY/QHP DX/THER: CPT

## 2023-07-05 PROCEDURE — 82140 ASSAY OF AMMONIA: CPT

## 2023-07-05 PROCEDURE — 51798 US URINE CAPACITY MEASURE: CPT

## 2023-07-05 PROCEDURE — 36415 COLL VENOUS BLD VENIPUNCTURE: CPT

## 2023-07-05 PROCEDURE — 74018 RADEX ABDOMEN 1 VIEW: CPT

## 2023-07-05 RX ORDER — METOPROLOL SUCCINATE 50 MG/1
50 TABLET, EXTENDED RELEASE ORAL DAILY
Status: DISCONTINUED | OUTPATIENT
Start: 2023-07-05 | End: 2023-07-07 | Stop reason: HOSPADM

## 2023-07-05 RX ORDER — SODIUM CHLORIDE 0.9 % (FLUSH) 0.9 %
5-40 SYRINGE (ML) INJECTION PRN
Status: DISCONTINUED | OUTPATIENT
Start: 2023-07-05 | End: 2023-07-07 | Stop reason: HOSPADM

## 2023-07-05 RX ORDER — DIPHENHYDRAMINE HYDROCHLORIDE 50 MG/ML
25 INJECTION INTRAMUSCULAR; INTRAVENOUS ONCE
Status: COMPLETED | OUTPATIENT
Start: 2023-07-05 | End: 2023-07-05

## 2023-07-05 RX ORDER — LIDOCAINE HYDROCHLORIDE 10 MG/ML
5 INJECTION, SOLUTION EPIDURAL; INFILTRATION; INTRACAUDAL; PERINEURAL ONCE
Status: DISCONTINUED | OUTPATIENT
Start: 2023-07-05 | End: 2023-07-07 | Stop reason: HOSPADM

## 2023-07-05 RX ORDER — SODIUM CHLORIDE 0.9 % (FLUSH) 0.9 %
5-40 SYRINGE (ML) INJECTION EVERY 12 HOURS SCHEDULED
Status: DISCONTINUED | OUTPATIENT
Start: 2023-07-05 | End: 2023-07-07 | Stop reason: HOSPADM

## 2023-07-05 RX ORDER — SODIUM CHLORIDE 9 MG/ML
INJECTION, SOLUTION INTRAVENOUS PRN
Status: DISCONTINUED | OUTPATIENT
Start: 2023-07-05 | End: 2023-07-07 | Stop reason: HOSPADM

## 2023-07-05 RX ORDER — LORAZEPAM 0.5 MG/1
0.5 TABLET ORAL NIGHTLY
Status: DISCONTINUED | OUTPATIENT
Start: 2023-07-05 | End: 2023-07-07 | Stop reason: HOSPADM

## 2023-07-05 RX ADMIN — DEXTROSE AND SODIUM CHLORIDE: 5; 900 INJECTION, SOLUTION INTRAVENOUS at 03:07

## 2023-07-05 RX ADMIN — COLLAGENASE SANTYL: 250 OINTMENT TOPICAL at 09:57

## 2023-07-05 RX ADMIN — CEFEPIME 1000 MG: 1 INJECTION, POWDER, FOR SOLUTION INTRAMUSCULAR; INTRAVENOUS at 10:03

## 2023-07-05 RX ADMIN — HYDROMORPHONE HYDROCHLORIDE 0.25 MG: 1 INJECTION, SOLUTION INTRAMUSCULAR; INTRAVENOUS; SUBCUTANEOUS at 05:50

## 2023-07-05 RX ADMIN — DIPHENHYDRAMINE HYDROCHLORIDE 25 MG: 50 INJECTION, SOLUTION INTRAMUSCULAR; INTRAVENOUS at 23:39

## 2023-07-05 RX ADMIN — SODIUM CHLORIDE, PRESERVATIVE FREE 10 ML: 5 INJECTION INTRAVENOUS at 21:06

## 2023-07-05 ASSESSMENT — PAIN SCALES - PAIN ASSESSMENT IN ADVANCED DEMENTIA (PAINAD)
NEGVOCALIZATION: 0
NEGVOCALIZATION: 2
FACIALEXPRESSION: 0
TOTALSCORE: 1
BREATHING: 1
BODYLANGUAGE: 2
FACIALEXPRESSION: 2
CONSOLABILITY: 2
CONSOLABILITY: 0
BREATHING: 0
BODYLANGUAGE: 1
TOTALSCORE: 9

## 2023-07-05 ASSESSMENT — PAIN SCALES - GENERAL: PAINLEVEL_OUTOF10: 0

## 2023-07-06 LAB
ALBUMIN SERPL-MCNC: 3.4 GM/DL (ref 3.4–5)
ALP BLD-CCNC: 69 IU/L (ref 40–128)
ALT SERPL-CCNC: 9 U/L (ref 10–40)
ANION GAP SERPL CALCULATED.3IONS-SCNC: 12 MMOL/L (ref 4–16)
AST SERPL-CCNC: 19 IU/L (ref 15–37)
BASOPHILS ABSOLUTE: 0.1 K/CU MM
BASOPHILS RELATIVE PERCENT: 0.8 % (ref 0–1)
BILIRUB SERPL-MCNC: 0.7 MG/DL (ref 0–1)
BUN SERPL-MCNC: 14 MG/DL (ref 6–23)
CALCIUM SERPL-MCNC: 8.4 MG/DL (ref 8.3–10.6)
CHLORIDE BLD-SCNC: 109 MMOL/L (ref 99–110)
CO2: 23 MMOL/L (ref 21–32)
CREAT SERPL-MCNC: 0.9 MG/DL (ref 0.6–1.1)
DIFFERENTIAL TYPE: ABNORMAL
EKG ATRIAL RATE: 113 BPM
EKG DIAGNOSIS: NORMAL
EKG P AXIS: 51 DEGREES
EKG P-R INTERVAL: 136 MS
EKG Q-T INTERVAL: 362 MS
EKG QRS DURATION: 80 MS
EKG QTC CALCULATION (BAZETT): 496 MS
EKG R AXIS: 14 DEGREES
EKG T AXIS: 32 DEGREES
EKG VENTRICULAR RATE: 113 BPM
EOSINOPHILS ABSOLUTE: 0.1 K/CU MM
EOSINOPHILS RELATIVE PERCENT: 1.1 % (ref 0–3)
GFR SERPL CREATININE-BSD FRML MDRD: 59 ML/MIN/1.73M2
GLUCOSE SERPL-MCNC: 92 MG/DL (ref 70–99)
HCT VFR BLD CALC: 40 % (ref 37–47)
HEMOGLOBIN: 12.8 GM/DL (ref 12.5–16)
IMMATURE NEUTROPHIL %: 0.4 % (ref 0–0.43)
LYMPHOCYTES ABSOLUTE: 2.2 K/CU MM
LYMPHOCYTES RELATIVE PERCENT: 22 % (ref 24–44)
MAGNESIUM: 1.5 MG/DL (ref 1.8–2.4)
MAGNESIUM: 1.6 MG/DL (ref 1.8–2.4)
MCH RBC QN AUTO: 29.6 PG (ref 27–31)
MCHC RBC AUTO-ENTMCNC: 32 % (ref 32–36)
MCV RBC AUTO: 92.6 FL (ref 78–100)
MONOCYTES ABSOLUTE: 0.9 K/CU MM
MONOCYTES RELATIVE PERCENT: 8.8 % (ref 0–4)
NUCLEATED RBC %: 0 %
PDW BLD-RTO: 13.5 % (ref 11.7–14.9)
PHOSPHORUS: 2.1 MG/DL (ref 2.5–4.9)
PLATELET # BLD: 139 K/CU MM (ref 140–440)
PMV BLD AUTO: 12.6 FL (ref 7.5–11.1)
POTASSIUM SERPL-SCNC: 3.2 MMOL/L (ref 3.5–5.1)
RBC # BLD: 4.32 M/CU MM (ref 4.2–5.4)
REASON FOR REJECTION: NORMAL
REJECTED TEST: NORMAL
SEGMENTED NEUTROPHILS ABSOLUTE COUNT: 6.7 K/CU MM
SEGMENTED NEUTROPHILS RELATIVE PERCENT: 66.9 % (ref 36–66)
SODIUM BLD-SCNC: 144 MMOL/L (ref 135–145)
TOTAL IMMATURE NEUTOROPHIL: 0.04 K/CU MM
TOTAL NUCLEATED RBC: 0 K/CU MM
TOTAL PROTEIN: 6.1 GM/DL (ref 6.4–8.2)
WBC # BLD: 10 K/CU MM (ref 4–10.5)

## 2023-07-06 PROCEDURE — 36415 COLL VENOUS BLD VENIPUNCTURE: CPT

## 2023-07-06 PROCEDURE — 2580000003 HC RX 258: Performed by: INTERNAL MEDICINE

## 2023-07-06 PROCEDURE — 93010 ELECTROCARDIOGRAM REPORT: CPT | Performed by: INTERNAL MEDICINE

## 2023-07-06 PROCEDURE — 80053 COMPREHEN METABOLIC PANEL: CPT

## 2023-07-06 PROCEDURE — 51701 INSERT BLADDER CATHETER: CPT

## 2023-07-06 PROCEDURE — 97530 THERAPEUTIC ACTIVITIES: CPT

## 2023-07-06 PROCEDURE — 1200000000 HC SEMI PRIVATE

## 2023-07-06 PROCEDURE — 85025 COMPLETE CBC W/AUTO DIFF WBC: CPT

## 2023-07-06 PROCEDURE — 83735 ASSAY OF MAGNESIUM: CPT

## 2023-07-06 PROCEDURE — 6360000002 HC RX W HCPCS: Performed by: STUDENT IN AN ORGANIZED HEALTH CARE EDUCATION/TRAINING PROGRAM

## 2023-07-06 PROCEDURE — 97116 GAIT TRAINING THERAPY: CPT

## 2023-07-06 PROCEDURE — 94761 N-INVAS EAR/PLS OXIMETRY MLT: CPT

## 2023-07-06 PROCEDURE — 51798 US URINE CAPACITY MEASURE: CPT

## 2023-07-06 PROCEDURE — 97162 PT EVAL MOD COMPLEX 30 MIN: CPT

## 2023-07-06 PROCEDURE — 92526 ORAL FUNCTION THERAPY: CPT

## 2023-07-06 PROCEDURE — 97166 OT EVAL MOD COMPLEX 45 MIN: CPT

## 2023-07-06 PROCEDURE — 6360000002 HC RX W HCPCS: Performed by: NURSE PRACTITIONER

## 2023-07-06 PROCEDURE — 6360000002 HC RX W HCPCS: Performed by: INTERNAL MEDICINE

## 2023-07-06 PROCEDURE — 84100 ASSAY OF PHOSPHORUS: CPT

## 2023-07-06 PROCEDURE — 93005 ELECTROCARDIOGRAM TRACING: CPT | Performed by: STUDENT IN AN ORGANIZED HEALTH CARE EDUCATION/TRAINING PROGRAM

## 2023-07-06 RX ORDER — MAGNESIUM SULFATE 4 G/50ML
4000 INJECTION INTRAVENOUS ONCE
Status: COMPLETED | OUTPATIENT
Start: 2023-07-06 | End: 2023-07-06

## 2023-07-06 RX ORDER — POTASSIUM CHLORIDE 7.45 MG/ML
10 INJECTION INTRAVENOUS
Status: DISPENSED | OUTPATIENT
Start: 2023-07-06 | End: 2023-07-06

## 2023-07-06 RX ORDER — POTASSIUM CHLORIDE 7.45 MG/ML
10 INJECTION INTRAVENOUS
Status: DISCONTINUED | OUTPATIENT
Start: 2023-07-06 | End: 2023-07-06

## 2023-07-06 RX ORDER — POTASSIUM CHLORIDE 20 MEQ/1
40 TABLET, EXTENDED RELEASE ORAL 2 TIMES DAILY WITH MEALS
Status: DISCONTINUED | OUTPATIENT
Start: 2023-07-06 | End: 2023-07-06

## 2023-07-06 RX ORDER — LORAZEPAM 2 MG/ML
0.5 INJECTION INTRAMUSCULAR ONCE
Status: COMPLETED | OUTPATIENT
Start: 2023-07-06 | End: 2023-07-06

## 2023-07-06 RX ADMIN — LABETALOL HYDROCHLORIDE 10 MG: 5 INJECTION, SOLUTION INTRAVENOUS at 15:28

## 2023-07-06 RX ADMIN — SODIUM CHLORIDE, PRESERVATIVE FREE 10 ML: 5 INJECTION INTRAVENOUS at 09:49

## 2023-07-06 RX ADMIN — POTASSIUM CHLORIDE 10 MEQ: 7.45 INJECTION INTRAVENOUS at 15:06

## 2023-07-06 RX ADMIN — LABETALOL HYDROCHLORIDE 10 MG: 5 INJECTION, SOLUTION INTRAVENOUS at 23:17

## 2023-07-06 RX ADMIN — LORAZEPAM 0.5 MG: 2 INJECTION INTRAMUSCULAR; INTRAVENOUS at 23:16

## 2023-07-06 RX ADMIN — MAGNESIUM SULFATE IN WATER 4000 MG: 80 INJECTION, SOLUTION INTRAVENOUS at 17:19

## 2023-07-06 RX ADMIN — POTASSIUM CHLORIDE 10 MEQ: 7.45 INJECTION INTRAVENOUS at 14:11

## 2023-07-06 RX ADMIN — DEXTROSE AND SODIUM CHLORIDE: 5; 900 INJECTION, SOLUTION INTRAVENOUS at 03:34

## 2023-07-06 RX ADMIN — COLLAGENASE SANTYL: 250 OINTMENT TOPICAL at 14:57

## 2023-07-06 RX ADMIN — POTASSIUM CHLORIDE 10 MEQ: 7.45 INJECTION INTRAVENOUS at 16:13

## 2023-07-06 RX ADMIN — LORAZEPAM 0.5 MG: 2 INJECTION INTRAMUSCULAR; INTRAVENOUS at 03:23

## 2023-07-06 RX ADMIN — POTASSIUM CHLORIDE 10 MEQ: 7.45 INJECTION INTRAVENOUS at 13:02

## 2023-07-06 RX ADMIN — CEFEPIME 1000 MG: 1 INJECTION, POWDER, FOR SOLUTION INTRAMUSCULAR; INTRAVENOUS at 13:03

## 2023-07-06 ASSESSMENT — PAIN SCALES - PAIN ASSESSMENT IN ADVANCED DEMENTIA (PAINAD)
BODYLANGUAGE: 1
FACIALEXPRESSION: 1
TOTALSCORE: 4
CONSOLABILITY: 0
BREATHING: 0
BODYLANGUAGE: 1
BREATHING: 0
TOTALSCORE: 1
FACIALEXPRESSION: 0
CONSOLABILITY: 1
NEGVOCALIZATION: 0
NEGVOCALIZATION: 1

## 2023-07-06 ASSESSMENT — PAIN SCALES - GENERAL: PAINLEVEL_OUTOF10: 0

## 2023-07-07 VITALS
SYSTOLIC BLOOD PRESSURE: 161 MMHG | TEMPERATURE: 97.8 F | RESPIRATION RATE: 18 BRPM | HEIGHT: 62 IN | OXYGEN SATURATION: 97 % | DIASTOLIC BLOOD PRESSURE: 74 MMHG | BODY MASS INDEX: 17.17 KG/M2 | HEART RATE: 76 BPM | WEIGHT: 93.3 LBS

## 2023-07-07 LAB
ALBUMIN SERPL-MCNC: 3.5 GM/DL (ref 3.4–5)
ALP BLD-CCNC: 71 IU/L (ref 40–129)
ALT SERPL-CCNC: 15 U/L (ref 10–40)
ANION GAP SERPL CALCULATED.3IONS-SCNC: 9 MMOL/L (ref 4–16)
AST SERPL-CCNC: 26 IU/L (ref 15–37)
BACTERIA: NEGATIVE /HPF
BASOPHILS ABSOLUTE: 0.1 K/CU MM
BASOPHILS RELATIVE PERCENT: 1.1 % (ref 0–1)
BILIRUB SERPL-MCNC: 0.5 MG/DL (ref 0–1)
BILIRUBIN URINE: NEGATIVE MG/DL
BLOOD, URINE: ABNORMAL
BUN SERPL-MCNC: 15 MG/DL (ref 6–23)
CALCIUM SERPL-MCNC: 8.3 MG/DL (ref 8.3–10.6)
CHLORIDE BLD-SCNC: 112 MMOL/L (ref 99–110)
CLARITY: CLEAR
CO2: 23 MMOL/L (ref 21–32)
COLOR: YELLOW
CREAT SERPL-MCNC: 0.9 MG/DL (ref 0.6–1.1)
DIFFERENTIAL TYPE: ABNORMAL
EOSINOPHILS ABSOLUTE: 0.2 K/CU MM
EOSINOPHILS RELATIVE PERCENT: 1.6 % (ref 0–3)
GFR SERPL CREATININE-BSD FRML MDRD: 59 ML/MIN/1.73M2
GLUCOSE SERPL-MCNC: 117 MG/DL (ref 70–99)
GLUCOSE, URINE: NEGATIVE MG/DL
HCT VFR BLD CALC: 40.4 % (ref 37–47)
HEMOGLOBIN: 12.8 GM/DL (ref 12.5–16)
HYALINE CASTS: 2 /LPF
IMMATURE NEUTROPHIL %: 0.3 % (ref 0–0.43)
KETONES, URINE: NEGATIVE MG/DL
LEUKOCYTE ESTERASE, URINE: NEGATIVE
LYMPHOCYTES ABSOLUTE: 2.1 K/CU MM
LYMPHOCYTES RELATIVE PERCENT: 21 % (ref 24–44)
MAGNESIUM: 2.3 MG/DL (ref 1.8–2.4)
MCH RBC QN AUTO: 29.6 PG (ref 27–31)
MCHC RBC AUTO-ENTMCNC: 31.7 % (ref 32–36)
MCV RBC AUTO: 93.5 FL (ref 78–100)
MONOCYTES ABSOLUTE: 0.9 K/CU MM
MONOCYTES RELATIVE PERCENT: 9 % (ref 0–4)
MUCUS: ABNORMAL HPF
NITRITE URINE, QUANTITATIVE: NEGATIVE
NUCLEATED RBC %: 0 %
PDW BLD-RTO: 13.4 % (ref 11.7–14.9)
PH, URINE: 5.5 (ref 5–8)
PHOSPHORUS: 2.8 MG/DL (ref 2.5–4.9)
PLATELET # BLD: 187 K/CU MM (ref 140–440)
PMV BLD AUTO: 10.2 FL (ref 7.5–11.1)
POTASSIUM SERPL-SCNC: 4.7 MMOL/L (ref 3.5–5.1)
PROTEIN UA: 30 MG/DL
RBC # BLD: 4.32 M/CU MM (ref 4.2–5.4)
RBC URINE: 2 /HPF (ref 0–6)
SEGMENTED NEUTROPHILS ABSOLUTE COUNT: 6.8 K/CU MM
SEGMENTED NEUTROPHILS RELATIVE PERCENT: 67 % (ref 36–66)
SODIUM BLD-SCNC: 144 MMOL/L (ref 135–145)
SPECIFIC GRAVITY UA: 1.02 (ref 1–1.03)
SQUAMOUS EPITHELIAL: <1 /HPF
TOTAL IMMATURE NEUTOROPHIL: 0.03 K/CU MM
TOTAL NUCLEATED RBC: 0 K/CU MM
TOTAL PROTEIN: 5.9 GM/DL (ref 6.4–8.2)
TRICHOMONAS: ABNORMAL /HPF
UROBILINOGEN, URINE: 0.2 MG/DL (ref 0.2–1)
WBC # BLD: 10.1 K/CU MM (ref 4–10.5)
WBC UA: <1 /HPF (ref 0–5)

## 2023-07-07 PROCEDURE — 2580000003 HC RX 258: Performed by: INTERNAL MEDICINE

## 2023-07-07 PROCEDURE — 81001 URINALYSIS AUTO W/SCOPE: CPT

## 2023-07-07 PROCEDURE — 84100 ASSAY OF PHOSPHORUS: CPT

## 2023-07-07 PROCEDURE — 97530 THERAPEUTIC ACTIVITIES: CPT

## 2023-07-07 PROCEDURE — 2580000003 HC RX 258: Performed by: STUDENT IN AN ORGANIZED HEALTH CARE EDUCATION/TRAINING PROGRAM

## 2023-07-07 PROCEDURE — 6360000002 HC RX W HCPCS: Performed by: INTERNAL MEDICINE

## 2023-07-07 PROCEDURE — 97535 SELF CARE MNGMENT TRAINING: CPT

## 2023-07-07 PROCEDURE — 83735 ASSAY OF MAGNESIUM: CPT

## 2023-07-07 PROCEDURE — 80053 COMPREHEN METABOLIC PANEL: CPT

## 2023-07-07 PROCEDURE — 6370000000 HC RX 637 (ALT 250 FOR IP): Performed by: STUDENT IN AN ORGANIZED HEALTH CARE EDUCATION/TRAINING PROGRAM

## 2023-07-07 PROCEDURE — 6370000000 HC RX 637 (ALT 250 FOR IP): Performed by: INTERNAL MEDICINE

## 2023-07-07 PROCEDURE — 6370000000 HC RX 637 (ALT 250 FOR IP): Performed by: FAMILY MEDICINE

## 2023-07-07 PROCEDURE — 2500000003 HC RX 250 WO HCPCS: Performed by: STUDENT IN AN ORGANIZED HEALTH CARE EDUCATION/TRAINING PROGRAM

## 2023-07-07 PROCEDURE — 51798 US URINE CAPACITY MEASURE: CPT

## 2023-07-07 PROCEDURE — 6360000002 HC RX W HCPCS: Performed by: STUDENT IN AN ORGANIZED HEALTH CARE EDUCATION/TRAINING PROGRAM

## 2023-07-07 PROCEDURE — 97116 GAIT TRAINING THERAPY: CPT

## 2023-07-07 PROCEDURE — 85025 COMPLETE CBC W/AUTO DIFF WBC: CPT

## 2023-07-07 RX ORDER — CEFEPIME 1 G/50ML
2000 INJECTION, SOLUTION INTRAVENOUS EVERY 24 HOURS
Qty: 700 ML | Refills: 0 | Status: SHIPPED | OUTPATIENT
Start: 2023-07-07 | End: 2023-07-14

## 2023-07-07 RX ORDER — LORAZEPAM 0.5 MG/1
0.5 TABLET ORAL NIGHTLY
Qty: 30 TABLET | Refills: 0 | Status: SHIPPED | OUTPATIENT
Start: 2023-07-07 | End: 2023-07-07 | Stop reason: SDUPTHER

## 2023-07-07 RX ORDER — METOPROLOL SUCCINATE 50 MG/1
50 TABLET, EXTENDED RELEASE ORAL DAILY
Qty: 30 TABLET | Refills: 3 | Status: SHIPPED | OUTPATIENT
Start: 2023-07-07

## 2023-07-07 RX ORDER — CEFEPIME 1 G/50ML
2000 INJECTION, SOLUTION INTRAVENOUS EVERY 24 HOURS
Qty: 700 ML | Refills: 0 | Status: SHIPPED | OUTPATIENT
Start: 2023-07-07 | End: 2023-07-07 | Stop reason: SDUPTHER

## 2023-07-07 RX ORDER — AMLODIPINE BESYLATE 10 MG/1
10 TABLET ORAL DAILY
Qty: 30 TABLET | Refills: 3 | Status: SHIPPED | OUTPATIENT
Start: 2023-07-07

## 2023-07-07 RX ORDER — MAGNESIUM SULFATE 4 G/50ML
4000 INJECTION INTRAVENOUS ONCE
Status: COMPLETED | OUTPATIENT
Start: 2023-07-07 | End: 2023-07-07

## 2023-07-07 RX ORDER — LORAZEPAM 0.5 MG/1
0.5 TABLET ORAL NIGHTLY
Qty: 14 TABLET | Refills: 0 | Status: SHIPPED | OUTPATIENT
Start: 2023-07-07 | End: 2023-07-21

## 2023-07-07 RX ADMIN — DEXTROSE AND SODIUM CHLORIDE: 5; 900 INJECTION, SOLUTION INTRAVENOUS at 11:17

## 2023-07-07 RX ADMIN — PANTOPRAZOLE SODIUM 40 MG: 40 TABLET, DELAYED RELEASE ORAL at 08:31

## 2023-07-07 RX ADMIN — MAGNESIUM SULFATE HEPTAHYDRATE 4000 MG: 80 INJECTION, SOLUTION INTRAVENOUS at 11:19

## 2023-07-07 RX ADMIN — DEXTROSE AND SODIUM CHLORIDE: 5; 900 INJECTION, SOLUTION INTRAVENOUS at 13:38

## 2023-07-07 RX ADMIN — METOPROLOL SUCCINATE 50 MG: 50 TABLET, EXTENDED RELEASE ORAL at 08:30

## 2023-07-07 RX ADMIN — APIXABAN 2.5 MG: 2.5 TABLET, FILM COATED ORAL at 08:30

## 2023-07-07 RX ADMIN — COLLAGENASE SANTYL: 250 OINTMENT TOPICAL at 08:31

## 2023-07-07 RX ADMIN — SODIUM PHOSPHATE, MONOBASIC, MONOHYDRATE AND SODIUM PHOSPHATE, DIBASIC, ANHYDROUS 30 MMOL: 142; 276 INJECTION, SOLUTION INTRAVENOUS at 14:11

## 2023-07-07 RX ADMIN — FERROUS SULFATE TAB 325 MG (65 MG ELEMENTAL FE) 325 MG: 325 (65 FE) TAB at 08:31

## 2023-07-07 RX ADMIN — CEFEPIME 1000 MG: 1 INJECTION, POWDER, FOR SOLUTION INTRAMUSCULAR; INTRAVENOUS at 08:44

## 2023-07-07 RX ADMIN — HYDROCODONE BITARTRATE AND ACETAMINOPHEN 1 TABLET: 5; 325 TABLET ORAL at 08:30

## 2023-07-07 RX ADMIN — DEXTROSE AND SODIUM CHLORIDE: 5; 900 INJECTION, SOLUTION INTRAVENOUS at 02:26

## 2023-07-07 RX ADMIN — FUROSEMIDE 40 MG: 40 TABLET ORAL at 08:30

## 2023-07-07 RX ADMIN — AMLODIPINE BESYLATE 10 MG: 10 TABLET ORAL at 08:29

## 2023-07-07 RX ADMIN — CYANOCOBALAMIN TAB 1000 MCG 1000 MCG: 1000 TAB at 08:30

## 2023-07-07 ASSESSMENT — PAIN SCALES - PAIN ASSESSMENT IN ADVANCED DEMENTIA (PAINAD)
CONSOLABILITY: 1
BREATHING: 1
CONSOLABILITY: 1
BODYLANGUAGE: 1
NEGVOCALIZATION: 1
NEGVOCALIZATION: 2
TOTALSCORE: 4
FACIALEXPRESSION: 1
TOTALSCORE: 6
FACIALEXPRESSION: 1
BREATHING: 0
BODYLANGUAGE: 1

## 2023-07-07 ASSESSMENT — PAIN DESCRIPTION - LOCATION: LOCATION: GENERALIZED

## 2023-07-10 ENCOUNTER — HOSPITAL ENCOUNTER (OUTPATIENT)
Age: 88
Setting detail: SPECIMEN
Discharge: HOME | End: 2023-07-10
Payer: MEDICARE

## 2023-07-10 LAB
ALBUMIN SERPL-MCNC: 3.5 GM/DL (ref 3.4–5)
ALP BLD-CCNC: 78 IU/L (ref 40–128)
ALT SERPL-CCNC: 13 U/L (ref 10–40)
ANION GAP SERPL CALCULATED.3IONS-SCNC: 13 MMOL/L (ref 4–16)
AST SERPL-CCNC: 13 IU/L (ref 15–37)
BASOPHILS ABSOLUTE: 0.1 K/CU MM
BASOPHILS RELATIVE PERCENT: 0.9 % (ref 0–1)
BILIRUB SERPL-MCNC: 0.2 MG/DL (ref 0–1)
BUN SERPL-MCNC: 37 MG/DL (ref 6–23)
CALCIUM SERPL-MCNC: 8.3 MG/DL (ref 8.3–10.6)
CHLORIDE BLD-SCNC: 106 MMOL/L (ref 99–110)
CO2: 20 MMOL/L (ref 21–32)
CREAT SERPL-MCNC: 1.8 MG/DL (ref 0.6–1.1)
CRP SERPL HS-MCNC: 11.8 MG/L
DIFFERENTIAL TYPE: ABNORMAL
EOSINOPHILS ABSOLUTE: 0.3 K/CU MM
EOSINOPHILS RELATIVE PERCENT: 3.9 % (ref 0–3)
ERYTHROCYTE SEDIMENTATION RATE: 14 MM/HR (ref 0–30)
GFR SERPL CREATININE-BSD FRML MDRD: 26 ML/MIN/1.73M2
GLUCOSE SERPL-MCNC: 84 MG/DL (ref 70–99)
HCT VFR BLD CALC: 40.3 % (ref 37–47)
HEMOGLOBIN: 12.1 GM/DL (ref 12.5–16)
IMMATURE NEUTROPHIL %: 0.3 % (ref 0–0.43)
LYMPHOCYTES ABSOLUTE: 1.5 K/CU MM
LYMPHOCYTES RELATIVE PERCENT: 18.3 % (ref 24–44)
MCH RBC QN AUTO: 29.4 PG (ref 27–31)
MCHC RBC AUTO-ENTMCNC: 30 % (ref 32–36)
MCV RBC AUTO: 97.8 FL (ref 78–100)
MONOCYTES ABSOLUTE: 0.7 K/CU MM
MONOCYTES RELATIVE PERCENT: 8.3 % (ref 0–4)
NUCLEATED RBC %: 0 %
PDW BLD-RTO: 13.3 % (ref 11.7–14.9)
PLATELET # BLD: 207 K/CU MM (ref 140–440)
PMV BLD AUTO: 10.8 FL (ref 7.5–11.1)
POTASSIUM SERPL-SCNC: 4.4 MMOL/L (ref 3.5–5.1)
RBC # BLD: 4.12 M/CU MM (ref 4.2–5.4)
SEGMENTED NEUTROPHILS ABSOLUTE COUNT: 5.4 K/CU MM
SEGMENTED NEUTROPHILS RELATIVE PERCENT: 68.3 % (ref 36–66)
SODIUM BLD-SCNC: 139 MMOL/L (ref 135–145)
TOTAL IMMATURE NEUTOROPHIL: 0.02 K/CU MM
TOTAL NUCLEATED RBC: 0 K/CU MM
TOTAL PROTEIN: 5.8 GM/DL (ref 6.4–8.2)
WBC # BLD: 7.9 K/CU MM (ref 4–10.5)

## 2023-07-10 PROCEDURE — 85025 COMPLETE CBC W/AUTO DIFF WBC: CPT

## 2023-07-10 PROCEDURE — 85652 RBC SED RATE AUTOMATED: CPT

## 2023-07-10 PROCEDURE — 80053 COMPREHEN METABOLIC PANEL: CPT

## 2023-07-10 PROCEDURE — 86140 C-REACTIVE PROTEIN: CPT

## 2023-07-13 ENCOUNTER — TELEPHONE (OUTPATIENT)
Dept: INFECTIOUS DISEASES | Age: 88
End: 2023-07-13

## 2023-07-17 ENCOUNTER — TELEPHONE (OUTPATIENT)
Dept: INFECTIOUS DISEASES | Age: 88
End: 2023-07-17

## 2023-07-17 NOTE — TELEPHONE ENCOUNTER
Domingo Rodriguez, from [facility] called and states that pt is done with antibiotics and wants to know if PICC line can be removed? Please advise.

## 2023-07-24 ENCOUNTER — HOSPITAL ENCOUNTER (OUTPATIENT)
Age: 88
Setting detail: SPECIMEN
Discharge: HOME | End: 2023-07-24
Payer: MEDICARE

## 2023-07-24 LAB
ALBUMIN SERPL-MCNC: 3.4 GM/DL (ref 3.4–5)
ALP BLD-CCNC: 61 IU/L (ref 40–128)
ALT SERPL-CCNC: 12 U/L (ref 10–40)
ANION GAP SERPL CALCULATED.3IONS-SCNC: 10 MMOL/L (ref 4–16)
AST SERPL-CCNC: 20 IU/L (ref 15–37)
BASOPHILS ABSOLUTE: 0.1 K/CU MM
BASOPHILS RELATIVE PERCENT: 0.9 % (ref 0–1)
BILIRUB SERPL-MCNC: 0.2 MG/DL (ref 0–1)
BUN SERPL-MCNC: 52 MG/DL (ref 6–23)
CALCIUM SERPL-MCNC: 8.6 MG/DL (ref 8.3–10.6)
CHLORIDE BLD-SCNC: 108 MMOL/L (ref 99–110)
CO2: 23 MMOL/L (ref 21–32)
CREAT SERPL-MCNC: 1.6 MG/DL (ref 0.6–1.1)
CRP SERPL HS-MCNC: 3 MG/L
DIFFERENTIAL TYPE: ABNORMAL
EOSINOPHILS ABSOLUTE: 0.3 K/CU MM
EOSINOPHILS RELATIVE PERCENT: 3.8 % (ref 0–3)
ERYTHROCYTE SEDIMENTATION RATE: 16 MM/HR (ref 0–30)
GFR SERPL CREATININE-BSD FRML MDRD: 30 ML/MIN/1.73M2
GLUCOSE SERPL-MCNC: 182 MG/DL (ref 70–99)
HCT VFR BLD CALC: 37.9 % (ref 37–47)
HEMOGLOBIN: 11.6 GM/DL (ref 12.5–16)
IMMATURE NEUTROPHIL %: 0.1 % (ref 0–0.43)
LYMPHOCYTES ABSOLUTE: 2 K/CU MM
LYMPHOCYTES RELATIVE PERCENT: 28.5 % (ref 24–44)
MCH RBC QN AUTO: 29.2 PG (ref 27–31)
MCHC RBC AUTO-ENTMCNC: 30.6 % (ref 32–36)
MCV RBC AUTO: 95.5 FL (ref 78–100)
MONOCYTES ABSOLUTE: 0.5 K/CU MM
MONOCYTES RELATIVE PERCENT: 6.5 % (ref 0–4)
NUCLEATED RBC %: 0 %
PDW BLD-RTO: 13.5 % (ref 11.7–14.9)
PLATELET # BLD: 209 K/CU MM (ref 140–440)
PMV BLD AUTO: 11.3 FL (ref 7.5–11.1)
POTASSIUM SERPL-SCNC: 5.7 MMOL/L (ref 3.5–5.1)
RBC # BLD: 3.97 M/CU MM (ref 4.2–5.4)
SEGMENTED NEUTROPHILS ABSOLUTE COUNT: 4.2 K/CU MM
SEGMENTED NEUTROPHILS RELATIVE PERCENT: 60.2 % (ref 36–66)
SODIUM BLD-SCNC: 141 MMOL/L (ref 135–145)
TOTAL IMMATURE NEUTOROPHIL: 0.01 K/CU MM
TOTAL NUCLEATED RBC: 0 K/CU MM
TOTAL PROTEIN: 6 GM/DL (ref 6.4–8.2)
WBC # BLD: 7.1 K/CU MM (ref 4–10.5)

## 2023-07-24 PROCEDURE — 85025 COMPLETE CBC W/AUTO DIFF WBC: CPT

## 2023-07-24 PROCEDURE — 80053 COMPREHEN METABOLIC PANEL: CPT

## 2023-07-24 PROCEDURE — 86140 C-REACTIVE PROTEIN: CPT

## 2023-07-24 PROCEDURE — 85652 RBC SED RATE AUTOMATED: CPT

## 2023-07-25 ENCOUNTER — TELEPHONE (OUTPATIENT)
Dept: ONCOLOGY | Age: 88
End: 2023-07-25